# Patient Record
Sex: MALE | Race: WHITE | NOT HISPANIC OR LATINO | URBAN - METROPOLITAN AREA
[De-identification: names, ages, dates, MRNs, and addresses within clinical notes are randomized per-mention and may not be internally consistent; named-entity substitution may affect disease eponyms.]

---

## 2023-12-12 PROBLEM — M54.12 RIGHT CERVICAL RADICULOPATHY: Status: ACTIVE | Noted: 2023-12-12

## 2023-12-12 PROBLEM — F45.8 BRUXISM: Status: ACTIVE | Noted: 2023-12-12

## 2023-12-12 PROBLEM — J32.9 SINUSITIS: Status: ACTIVE | Noted: 2023-12-12

## 2023-12-12 PROBLEM — K44.9 HIATAL HERNIA: Status: ACTIVE | Noted: 2023-12-12

## 2023-12-12 PROBLEM — H53.2 TRANSIENT DIPLOPIA: Status: ACTIVE | Noted: 2023-12-12

## 2023-12-12 PROBLEM — F43.21 SITUATIONAL DEPRESSION: Status: ACTIVE | Noted: 2023-12-12

## 2023-12-12 PROBLEM — I10 HYPERTENSION: Status: ACTIVE | Noted: 2023-12-12

## 2023-12-12 PROBLEM — J30.9 ALLERGIC RHINITIS: Status: ACTIVE | Noted: 2023-12-12

## 2023-12-12 PROBLEM — J45.909 REACTIVE AIRWAY DISEASE (HHS-HCC): Status: ACTIVE | Noted: 2023-12-12

## 2023-12-12 PROBLEM — M26.629 TMJ ARTHRALGIA: Status: ACTIVE | Noted: 2023-12-12

## 2023-12-12 PROBLEM — F11.21 NARCOTIC DEPENDENCE, IN REMISSION (MULTI): Status: ACTIVE | Noted: 2023-12-12

## 2023-12-12 PROBLEM — E78.5 HYPERLIPIDEMIA: Status: ACTIVE | Noted: 2023-12-12

## 2023-12-12 PROBLEM — M25.562 LEFT KNEE PAIN: Status: ACTIVE | Noted: 2023-12-12

## 2023-12-12 PROBLEM — M25.532 LEFT WRIST PAIN: Status: ACTIVE | Noted: 2023-12-12

## 2023-12-12 PROBLEM — E66.3 OVERWEIGHT: Status: ACTIVE | Noted: 2023-12-12

## 2023-12-12 PROBLEM — K22.70 BARRETT'S ESOPHAGUS: Status: ACTIVE | Noted: 2023-12-12

## 2023-12-12 PROBLEM — J20.9 ACUTE BRONCHITIS: Status: ACTIVE | Noted: 2023-12-12

## 2023-12-12 PROBLEM — M19.90 ARTHRITIS: Status: ACTIVE | Noted: 2023-12-12

## 2023-12-12 PROBLEM — R63.4 UNINTENTIONAL WEIGHT LOSS: Status: ACTIVE | Noted: 2023-12-12

## 2023-12-12 PROBLEM — G89.29 CHRONIC PAIN: Status: ACTIVE | Noted: 2023-12-12

## 2023-12-12 PROBLEM — M54.9 BACKACHE: Status: ACTIVE | Noted: 2023-12-12

## 2023-12-12 PROBLEM — R61 NIGHT SWEATS: Status: ACTIVE | Noted: 2023-12-12

## 2023-12-12 PROBLEM — R22.32 MASS OF LEFT AXILLA: Status: ACTIVE | Noted: 2023-12-12

## 2023-12-12 PROBLEM — R11.0 NAUSEA: Status: ACTIVE | Noted: 2023-12-12

## 2023-12-12 RX ORDER — PANTOPRAZOLE SODIUM 40 MG/1
1 TABLET, DELAYED RELEASE ORAL DAILY
COMMUNITY
Start: 2013-05-21 | End: 2024-04-09 | Stop reason: WASHOUT

## 2023-12-12 RX ORDER — ATORVASTATIN CALCIUM 20 MG/1
1 TABLET, FILM COATED ORAL DAILY
COMMUNITY
Start: 2017-12-15

## 2023-12-12 RX ORDER — CYCLOBENZAPRINE HCL 10 MG
10 TABLET ORAL 3 TIMES DAILY PRN
COMMUNITY
Start: 2018-01-22

## 2023-12-12 RX ORDER — LOSARTAN POTASSIUM 100 MG/1
1 TABLET ORAL DAILY
COMMUNITY
Start: 2018-03-12

## 2023-12-12 RX ORDER — CALCIUM/MAGNESIUM 300-300 MG
1 TABLET ORAL DAILY
COMMUNITY
Start: 2017-08-22

## 2023-12-12 RX ORDER — IBUPROFEN 800 MG/1
TABLET ORAL
COMMUNITY
Start: 2017-10-13 | End: 2024-04-09 | Stop reason: WASHOUT

## 2023-12-13 ENCOUNTER — TELEPHONE (OUTPATIENT)
Dept: PAIN MEDICINE | Facility: CLINIC | Age: 58
End: 2023-12-13

## 2023-12-13 ENCOUNTER — OFFICE VISIT (OUTPATIENT)
Dept: PAIN MEDICINE | Facility: CLINIC | Age: 58
End: 2023-12-13
Payer: COMMERCIAL

## 2023-12-13 VITALS
HEART RATE: 62 BPM | SYSTOLIC BLOOD PRESSURE: 149 MMHG | WEIGHT: 199 LBS | OXYGEN SATURATION: 99 % | RESPIRATION RATE: 16 BRPM | TEMPERATURE: 97.5 F | DIASTOLIC BLOOD PRESSURE: 80 MMHG | HEIGHT: 72 IN | BODY MASS INDEX: 26.95 KG/M2

## 2023-12-13 DIAGNOSIS — G90.512 COMPLEX REGIONAL PAIN SYNDROME TYPE 1 OF LEFT UPPER EXTREMITY: Primary | ICD-10-CM

## 2023-12-13 DIAGNOSIS — M25.532 LEFT WRIST PAIN: ICD-10-CM

## 2023-12-13 PROCEDURE — 99203 OFFICE O/P NEW LOW 30 MIN: CPT | Performed by: ANESTHESIOLOGY

## 2023-12-13 PROCEDURE — 1036F TOBACCO NON-USER: CPT | Performed by: ANESTHESIOLOGY

## 2023-12-13 PROCEDURE — 99213 OFFICE O/P EST LOW 20 MIN: CPT | Performed by: ANESTHESIOLOGY

## 2023-12-13 PROCEDURE — 3079F DIAST BP 80-89 MM HG: CPT | Performed by: ANESTHESIOLOGY

## 2023-12-13 PROCEDURE — 3077F SYST BP >= 140 MM HG: CPT | Performed by: ANESTHESIOLOGY

## 2023-12-13 RX ORDER — OXYCODONE AND ACETAMINOPHEN 7.5; 325 MG/1; MG/1
1 TABLET ORAL EVERY 8 HOURS PRN
COMMUNITY

## 2023-12-13 ASSESSMENT — PATIENT HEALTH QUESTIONNAIRE - PHQ9
1. LITTLE INTEREST OR PLEASURE IN DOING THINGS: NOT AT ALL
SUM OF ALL RESPONSES TO PHQ9 QUESTIONS 1 AND 2: 0
2. FEELING DOWN, DEPRESSED OR HOPELESS: NOT AT ALL

## 2023-12-13 ASSESSMENT — ENCOUNTER SYMPTOMS
OCCASIONAL FEELINGS OF UNSTEADINESS: 0
DIFFICULTY URINATING: 0
SHORTNESS OF BREATH: 0
WEAKNESS: 1
FEVER: 0
DEPRESSION: 0
ABDOMINAL PAIN: 0
LOSS OF SENSATION IN FEET: 0
EYE PAIN: 0
ADENOPATHY: 0
BACK PAIN: 0

## 2023-12-13 ASSESSMENT — PAIN - FUNCTIONAL ASSESSMENT: PAIN_FUNCTIONAL_ASSESSMENT: 0-10

## 2023-12-13 ASSESSMENT — LIFESTYLE VARIABLES: TOTAL SCORE: 0

## 2023-12-13 ASSESSMENT — PAIN SCALES - GENERAL
PAINLEVEL: 7
PAINLEVEL_OUTOF10: 7

## 2023-12-13 ASSESSMENT — COLUMBIA-SUICIDE SEVERITY RATING SCALE - C-SSRS
6. HAVE YOU EVER DONE ANYTHING, STARTED TO DO ANYTHING, OR PREPARED TO DO ANYTHING TO END YOUR LIFE?: NO
2. HAVE YOU ACTUALLY HAD ANY THOUGHTS OF KILLING YOURSELF?: NO
1. IN THE PAST MONTH, HAVE YOU WISHED YOU WERE DEAD OR WISHED YOU COULD GO TO SLEEP AND NOT WAKE UP?: NO

## 2023-12-13 NOTE — PROGRESS NOTES
Chief Complaint   Patient presents with    New Patient Visit     Patient has left wrist pain     History of Present Complaint:  The patient was referred to us by Referring Provider: Dr. Vishal Alvarez . this is 58 y.o.  male  Not accompanied by another person with a past history of  hypertension hyperlipidemia TMG Glass's esophagus, resolve for 3 years now, history of arthritis back pain left knee pain left wrist pain cervical pain  presenting with  for the left wrist pain.  Patient states that his cousin is a doctor who referred him here for  Radha ganglion cyst this injection as a treatment for his left wrist pain    Pain started 2015 patient had a torn scapular ligaments in his left hand he had a planned surgical procedure done patient states that the doctor removed 5 extra bones which caused him complex pain regional syndrome Worse unchanged     The pain is described as  burning numbing tingling  and is relieved by nothing.      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    Diagnostic studies:  X-rays of the left wrist    Opioid Risk Assessment Score 0/26
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
## 2023-12-15 ENCOUNTER — OFFICE VISIT (OUTPATIENT)
Dept: PAIN MEDICINE | Facility: CLINIC | Age: 58
End: 2023-12-15
Payer: COMMERCIAL

## 2023-12-15 ENCOUNTER — HOSPITAL ENCOUNTER (OUTPATIENT)
Dept: PAIN MEDICINE | Facility: CLINIC | Age: 58
Discharge: HOME | End: 2023-12-15
Payer: COMMERCIAL

## 2023-12-15 VITALS
TEMPERATURE: 97.9 F | OXYGEN SATURATION: 96 % | HEART RATE: 72 BPM | DIASTOLIC BLOOD PRESSURE: 88 MMHG | RESPIRATION RATE: 20 BRPM | SYSTOLIC BLOOD PRESSURE: 186 MMHG

## 2023-12-15 VITALS
SYSTOLIC BLOOD PRESSURE: 113 MMHG | TEMPERATURE: 97.9 F | HEART RATE: 69 BPM | DIASTOLIC BLOOD PRESSURE: 56 MMHG | OXYGEN SATURATION: 96 % | RESPIRATION RATE: 18 BRPM

## 2023-12-15 DIAGNOSIS — G90.512 COMPLEX REGIONAL PAIN SYNDROME TYPE 1 OF LEFT UPPER EXTREMITY: Primary | ICD-10-CM

## 2023-12-15 DIAGNOSIS — G90.512 COMPLEX REGIONAL PAIN SYNDROME TYPE 1 OF LEFT UPPER EXTREMITY: ICD-10-CM

## 2023-12-15 PROCEDURE — 2500000005 HC RX 250 GENERAL PHARMACY W/O HCPCS

## 2023-12-15 PROCEDURE — 7100000010 HC PHASE TWO TIME - EACH INCREMENTAL 1 MINUTE

## 2023-12-15 PROCEDURE — 7100000009 HC PHASE TWO TIME - INITIAL BASE CHARGE

## 2023-12-15 PROCEDURE — 2500000004 HC RX 250 GENERAL PHARMACY W/ HCPCS (ALT 636 FOR OP/ED)

## 2023-12-15 PROCEDURE — 2500000004 HC RX 250 GENERAL PHARMACY W/ HCPCS (ALT 636 FOR OP/ED): Performed by: ANESTHESIOLOGY

## 2023-12-15 PROCEDURE — 64510 N BLOCK STELLATE GANGLION: CPT | Performed by: ANESTHESIOLOGY

## 2023-12-15 PROCEDURE — 64510 N BLOCK STELLATE GANGLION: CPT

## 2023-12-15 PROCEDURE — A4216 STERILE WATER/SALINE, 10 ML: HCPCS

## 2023-12-15 RX ORDER — LIDOCAINE HYDROCHLORIDE 10 MG/ML
INJECTION, SOLUTION EPIDURAL; INFILTRATION; INTRACAUDAL; PERINEURAL
Status: COMPLETED
Start: 2023-12-15 | End: 2023-12-15

## 2023-12-15 RX ORDER — DEXAMETHASONE SODIUM PHOSPHATE 10 MG/ML
INJECTION INTRAMUSCULAR; INTRAVENOUS
Status: COMPLETED
Start: 2023-12-15 | End: 2023-12-15

## 2023-12-15 RX ORDER — SODIUM CHLORIDE 9 MG/ML
INJECTION, SOLUTION INTRAMUSCULAR; INTRAVENOUS; SUBCUTANEOUS
Status: COMPLETED
Start: 2023-12-15 | End: 2023-12-15

## 2023-12-15 RX ORDER — ROPIVACAINE HYDROCHLORIDE 5 MG/ML
INJECTION, SOLUTION EPIDURAL; INFILTRATION; PERINEURAL
Status: COMPLETED
Start: 2023-12-15 | End: 2023-12-15

## 2023-12-15 RX ADMIN — ROPIVACAINE HYDROCHLORIDE 150 MG: 5 INJECTION, SOLUTION EPIDURAL; INFILTRATION; PERINEURAL at 11:37

## 2023-12-15 RX ADMIN — DEXAMETHASONE SODIUM PHOSPHATE 10 MG: 10 INJECTION, SOLUTION INTRAMUSCULAR; INTRAVENOUS at 11:36

## 2023-12-15 RX ADMIN — SODIUM CHLORIDE, SODIUM LACTATE, POTASSIUM CHLORIDE, AND CALCIUM CHLORIDE 500 ML: .6; .31; .03; .02 INJECTION, SOLUTION INTRAVENOUS at 11:20

## 2023-12-15 RX ADMIN — LIDOCAINE HYDROCHLORIDE 50 MG: 10 INJECTION, SOLUTION EPIDURAL; INFILTRATION; INTRACAUDAL; PERINEURAL at 11:37

## 2023-12-15 RX ADMIN — SODIUM CHLORIDE: 9 INJECTION INTRAMUSCULAR; INTRAVENOUS; SUBCUTANEOUS at 11:42

## 2023-12-15 ASSESSMENT — COLUMBIA-SUICIDE SEVERITY RATING SCALE - C-SSRS
6. HAVE YOU EVER DONE ANYTHING, STARTED TO DO ANYTHING, OR PREPARED TO DO ANYTHING TO END YOUR LIFE?: NO
2. HAVE YOU ACTUALLY HAD ANY THOUGHTS OF KILLING YOURSELF?: NO
2. HAVE YOU ACTUALLY HAD ANY THOUGHTS OF KILLING YOURSELF?: NO
6. HAVE YOU EVER DONE ANYTHING, STARTED TO DO ANYTHING, OR PREPARED TO DO ANYTHING TO END YOUR LIFE?: NO
1. IN THE PAST MONTH, HAVE YOU WISHED YOU WERE DEAD OR WISHED YOU COULD GO TO SLEEP AND NOT WAKE UP?: NO
1. IN THE PAST MONTH, HAVE YOU WISHED YOU WERE DEAD OR WISHED YOU COULD GO TO SLEEP AND NOT WAKE UP?: NO
6. HAVE YOU EVER DONE ANYTHING, STARTED TO DO ANYTHING, OR PREPARED TO DO ANYTHING TO END YOUR LIFE?: NO
1. IN THE PAST MONTH, HAVE YOU WISHED YOU WERE DEAD OR WISHED YOU COULD GO TO SLEEP AND NOT WAKE UP?: NO
2. HAVE YOU ACTUALLY HAD ANY THOUGHTS OF KILLING YOURSELF?: NO

## 2023-12-15 ASSESSMENT — PAIN SCALES - GENERAL
PAINLEVEL_OUTOF10: 7
PAINLEVEL_OUTOF10: 5 - MODERATE PAIN
PAINLEVEL_OUTOF10: 5 - MODERATE PAIN

## 2023-12-15 ASSESSMENT — PAIN - FUNCTIONAL ASSESSMENT
PAIN_FUNCTIONAL_ASSESSMENT: 0-10
PAIN_FUNCTIONAL_ASSESSMENT: 0-10

## 2023-12-15 ASSESSMENT — PATIENT HEALTH QUESTIONNAIRE - PHQ9
SUM OF ALL RESPONSES TO PHQ9 QUESTIONS 1 AND 2: 0
1. LITTLE INTEREST OR PLEASURE IN DOING THINGS: NOT AT ALL
2. FEELING DOWN, DEPRESSED OR HOPELESS: NOT AT ALL

## 2023-12-15 ASSESSMENT — ENCOUNTER SYMPTOMS
OCCASIONAL FEELINGS OF UNSTEADINESS: 0
DEPRESSION: 0
LOSS OF SENSATION IN FEET: 0

## 2023-12-15 NOTE — NURSING NOTE
Patient signed electronic consent  or paper consent   In room: 1129  Participants: Robin Mir Bucknavich, Robert RN, Elayne Farrar LPN, Max Leavitt [facility]  Patient placed R side down position  Time out : 1131  Prep and drapped 1132  Procedure start:1135  Patient tolerating procedure .  Imaged  Ultra Sound Used .   Procedure end: 1150  Debriefing :1150  Band aid applied L neck .  Out of room: 1152 to Phase One recovery.

## 2023-12-15 NOTE — OP NOTE
Procedure Note     Date: 12/15/2023  OR Location: PAR NON-OR PROCEDURES    Name: Sundar Gentile, : 1965, Age: 58 y.o., MRN: [id number], Sex: male    Diagnosis  Preprocedure diagnosis: CRPS type 1  Postprocedure diagnosis: Same    Procedures  Left-sided Stellate ganglion block  The patient was seen in the preoperative area. The risks, benefits, complications, treatment options, non-operative alternatives, expected recovery and outcomes were discussed with the patient. The patient concurred with the proposed plan, giving informed consent.       Procedure: The risks and benefits of treatment options and alternatives were discussed with the patient, and consent was obtained for a stellate ganglion block.  Standard ASA monitoring was used throughout the procedure including EKG, pulse oximetry and noninvasive blood pressure monitoring.  The patient was positioned in lateral position with left side up. Using ultrasound left-sided carotid and internal jugular were identified at C6 level.  Color Doppler was used to identify vascular structures.  A 25-gauge needle was used to anesthetize the skin and subcutaneous tissue with 1% lidocaine.  Then a 22-gauge 1-1/2 inch needle was advanced under direct ultrasound guidance avoiding any vascular structure, needle was advanced to just posterior to internal carotid artery, after negative aspiration a solution containing 2 mL of 0.5% ropivacaine, 2 mL of normal saline 10 mg of dexamethasone was injected in small aliquots expanding the space between internal carotid and longus colli muscle was visualized with live ultrasound.  The patient tolerated the procedure without inducing paresthesia or pain. Patient was transferred to recovery in stable condition and subsequently discharged home.        Complications:  None; patient tolerated the procedure well.    Disposition: Home  Condition: stable         Additional Details: NA    Attending Attestation: I performed the  procedure.    Robin Tejada MD

## 2023-12-15 NOTE — DISCHARGE INSTRUCTIONS
Discharge instructions reviewed verbally and printed instructions given.  Patient verbalize understanding.

## 2023-12-15 NOTE — PROGRESS NOTES
Patient signed electronic consent  or paper consent   In room: 1129  Participants: Robin Mir Bucknavich, Robert RN, Elayne Farrar LPN, Max Leavitt [facility]  Patient placed R side down position  Time out : 1131  Prep and drridge 1132  Procedure start:1135  Patient tolerating procedure .  Imaged  Ultra Sound Used .   Procedure end:   Debriefing :  Band aid applied L neck .  Out of room:

## 2023-12-18 ENCOUNTER — TELEPHONE (OUTPATIENT)
Dept: PAIN MEDICINE | Facility: CLINIC | Age: 58
End: 2023-12-18
Payer: COMMERCIAL

## 2023-12-18 DIAGNOSIS — G90.512 COMPLEX REGIONAL PAIN SYNDROME TYPE 1 OF LEFT UPPER EXTREMITY: Primary | ICD-10-CM

## 2023-12-18 NOTE — TELEPHONE ENCOUNTER
PT called and said he needs to be scheduled for another sympathetic block. He stated he is supposed to have 3 done before the end of 2023.  He was just here on 12/15 and is scheduled for a FUV 2/8.      Please advise if the patient is to have another block with you.  If so, can you put in a new order so he can get scheduled?  Thank you.

## 2023-12-18 NOTE — TELEPHONE ENCOUNTER
Left VM for patient to call me back with any information regarding the benefit he got from the shot on 12/15.

## 2023-12-18 NOTE — TELEPHONE ENCOUNTER
Patient called back and stated he did not get much benefit at all.  Out of ten he said maybe a 1/2 out of 10.  Please advise if you would like to have him scheduled for this another procedure and if you could, please put in new orders.  Thank you.

## 2023-12-22 ENCOUNTER — HOSPITAL ENCOUNTER (OUTPATIENT)
Dept: PAIN MEDICINE | Facility: CLINIC | Age: 58
Discharge: HOME | End: 2023-12-22
Payer: COMMERCIAL

## 2023-12-22 VITALS
TEMPERATURE: 96.8 F | SYSTOLIC BLOOD PRESSURE: 174 MMHG | DIASTOLIC BLOOD PRESSURE: 101 MMHG | RESPIRATION RATE: 18 BRPM | OXYGEN SATURATION: 98 % | HEART RATE: 65 BPM

## 2023-12-22 DIAGNOSIS — G90.512 COMPLEX REGIONAL PAIN SYNDROME TYPE 1 OF LEFT UPPER EXTREMITY: Primary | ICD-10-CM

## 2023-12-22 PROCEDURE — 2500000005 HC RX 250 GENERAL PHARMACY W/O HCPCS

## 2023-12-22 PROCEDURE — 64510 N BLOCK STELLATE GANGLION: CPT | Performed by: ANESTHESIOLOGY

## 2023-12-22 PROCEDURE — 2500000004 HC RX 250 GENERAL PHARMACY W/ HCPCS (ALT 636 FOR OP/ED)

## 2023-12-22 PROCEDURE — 2500000004 HC RX 250 GENERAL PHARMACY W/ HCPCS (ALT 636 FOR OP/ED): Performed by: ANESTHESIOLOGY

## 2023-12-22 RX ORDER — DEXAMETHASONE SODIUM PHOSPHATE 10 MG/ML
INJECTION INTRAMUSCULAR; INTRAVENOUS
Status: COMPLETED
Start: 2023-12-22 | End: 2023-12-22

## 2023-12-22 RX ORDER — LIDOCAINE HYDROCHLORIDE 10 MG/ML
INJECTION, SOLUTION EPIDURAL; INFILTRATION; INTRACAUDAL; PERINEURAL
Status: COMPLETED
Start: 2023-12-22 | End: 2023-12-22

## 2023-12-22 RX ORDER — ROPIVACAINE HYDROCHLORIDE 5 MG/ML
INJECTION, SOLUTION EPIDURAL; INFILTRATION; PERINEURAL
Status: COMPLETED
Start: 2023-12-22 | End: 2023-12-22

## 2023-12-22 RX ADMIN — DEXAMETHASONE SODIUM PHOSPHATE 10 MG: 10 INJECTION, SOLUTION INTRAMUSCULAR; INTRAVENOUS at 10:29

## 2023-12-22 RX ADMIN — ROPIVACAINE HYDROCHLORIDE 100 MG: 5 INJECTION, SOLUTION EPIDURAL; INFILTRATION; PERINEURAL at 10:29

## 2023-12-22 RX ADMIN — SODIUM CHLORIDE, POTASSIUM CHLORIDE, SODIUM LACTATE AND CALCIUM CHLORIDE 500 ML: 600; 310; 30; 20 INJECTION, SOLUTION INTRAVENOUS at 11:16

## 2023-12-22 RX ADMIN — LIDOCAINE HYDROCHLORIDE 50 MG: 10 INJECTION, SOLUTION EPIDURAL; INFILTRATION; INTRACAUDAL; PERINEURAL at 10:30

## 2023-12-22 ASSESSMENT — COLUMBIA-SUICIDE SEVERITY RATING SCALE - C-SSRS
2. HAVE YOU ACTUALLY HAD ANY THOUGHTS OF KILLING YOURSELF?: NO
1. IN THE PAST MONTH, HAVE YOU WISHED YOU WERE DEAD OR WISHED YOU COULD GO TO SLEEP AND NOT WAKE UP?: NO
6. HAVE YOU EVER DONE ANYTHING, STARTED TO DO ANYTHING, OR PREPARED TO DO ANYTHING TO END YOUR LIFE?: NO

## 2023-12-22 ASSESSMENT — PAIN SCALES - GENERAL
PAINLEVEL_OUTOF10: 0 - NO PAIN
PAINLEVEL_OUTOF10: 0 - NO PAIN
PAINLEVEL_OUTOF10: 7
PAINLEVEL_OUTOF10: 0 - NO PAIN

## 2023-12-22 ASSESSMENT — PAIN - FUNCTIONAL ASSESSMENT: PAIN_FUNCTIONAL_ASSESSMENT: 0-10

## 2023-12-22 NOTE — Clinical Note
Prepped with ChloraPrep, a minimum of 3 minute dry time, longer if needed, no pooling noted, patient draped in sterile fashion. Right neck

## 2023-12-22 NOTE — OP NOTE
Procedure Note     Date: 2023  OR Location: PAR NON-OR PROCEDURES    Name: Sundar Gentile, : 1965, Age: 58 y.o., MRN: [id number], Sex: male  Diagnosis  Preprocedure diagnosis: CRPS type 1  Postprocedure diagnosis: Same     Procedures  Left-sided Stellate ganglion block  The patient was seen in the preoperative area. The risks, benefits, complications, treatment options, non-operative alternatives, expected recovery and outcomes were discussed with the patient. The patient concurred with the proposed plan, giving informed consent.         Procedure: The risks and benefits of treatment options and alternatives were discussed with the patient, and consent was obtained for a stellate ganglion block.  Standard ASA monitoring was used throughout the procedure including EKG, pulse oximetry and noninvasive blood pressure monitoring.  The patient was positioned in lateral position with left side up. Using ultrasound left-sided carotid and internal jugular were identified at C6 level.  Color Doppler was used to identify vascular structures.  A 25-gauge needle was used to anesthetize the skin and subcutaneous tissue with 1% lidocaine.  Then a 22-gauge 1-1/2 inch needle was advanced under direct ultrasound guidance avoiding any vascular structure, needle was advanced to just posterior to internal carotid artery, after negative aspiration a solution containing 2 mL of 0.5% ropivacaine, 2 mL of normal saline 10 mg of dexamethasone was injected in small aliquots expanding the space between internal carotid and longus colli muscle was visualized with live ultrasound.  The patient tolerated the procedure without inducing paresthesia or pain. Patient was transferred to recovery in stable condition and subsequently discharged home.           Complications:  None; patient tolerated the procedure well.    Disposition: Home  Condition: stable            Additional Details: NA     Attending Attestation: I performed the  procedure.     Robin Tejada MD

## 2023-12-26 ENCOUNTER — TELEPHONE (OUTPATIENT)
Dept: PAIN MEDICINE | Facility: CLINIC | Age: 58
End: 2023-12-26
Payer: COMMERCIAL

## 2023-12-26 NOTE — TELEPHONE ENCOUNTER
Would like a request put in for Sympathetic Block for this Friday.  The request on file has .  Thank You!

## 2023-12-27 NOTE — TELEPHONE ENCOUNTER
Dr Tejada,    The patient called back wanting to schedule the procedure.  He did not get much relief but would like to try it again.  Could you please put in another order so we can get the patient scheduled?  Thank you.

## 2023-12-27 NOTE — TELEPHONE ENCOUNTER
Result Communication    No results found from the In Basket message.    11:25 AM      Results {WERE / WERE NOT:67871} successfully communicated with the {RHEUM PARENT/PATIENT:58834} and they {AMB Acknowledged/Did Not Acknowledge:55321} their understanding.

## 2023-12-28 NOTE — TELEPHONE ENCOUNTER
I called and let the patient know that because he did not get much benefit from the injection the Dr will not be putting in an order for a 3rd procedure.

## 2024-02-08 ENCOUNTER — APPOINTMENT (OUTPATIENT)
Dept: PAIN MEDICINE | Facility: CLINIC | Age: 59
End: 2024-02-08
Payer: COMMERCIAL

## 2024-03-27 DIAGNOSIS — M19.132 POST-TRAUMATIC ARTHRITIS OF WRIST, LEFT: Primary | ICD-10-CM

## 2024-04-08 ENCOUNTER — OFFICE VISIT (OUTPATIENT)
Dept: PAIN MEDICINE | Facility: CLINIC | Age: 59
End: 2024-04-08
Payer: COMMERCIAL

## 2024-04-08 VITALS
RESPIRATION RATE: 18 BRPM | OXYGEN SATURATION: 100 % | TEMPERATURE: 97.2 F | DIASTOLIC BLOOD PRESSURE: 84 MMHG | SYSTOLIC BLOOD PRESSURE: 136 MMHG | HEART RATE: 62 BPM | WEIGHT: 203 LBS | HEIGHT: 71 IN | BODY MASS INDEX: 28.42 KG/M2

## 2024-04-08 DIAGNOSIS — M25.532 LEFT WRIST PAIN: ICD-10-CM

## 2024-04-08 DIAGNOSIS — M19.132 POST-TRAUMATIC ARTHRITIS OF WRIST, LEFT: Primary | ICD-10-CM

## 2024-04-08 PROCEDURE — 1036F TOBACCO NON-USER: CPT | Performed by: ANESTHESIOLOGY

## 2024-04-08 PROCEDURE — 99212 OFFICE O/P EST SF 10 MIN: CPT | Performed by: ANESTHESIOLOGY

## 2024-04-08 PROCEDURE — 3075F SYST BP GE 130 - 139MM HG: CPT | Performed by: ANESTHESIOLOGY

## 2024-04-08 PROCEDURE — 3079F DIAST BP 80-89 MM HG: CPT | Performed by: ANESTHESIOLOGY

## 2024-04-08 SDOH — ECONOMIC STABILITY: FOOD INSECURITY: WITHIN THE PAST 12 MONTHS, YOU WORRIED THAT YOUR FOOD WOULD RUN OUT BEFORE YOU GOT MONEY TO BUY MORE.: NEVER TRUE

## 2024-04-08 SDOH — ECONOMIC STABILITY: FOOD INSECURITY: WITHIN THE PAST 12 MONTHS, THE FOOD YOU BOUGHT JUST DIDN'T LAST AND YOU DIDN'T HAVE MONEY TO GET MORE.: NEVER TRUE

## 2024-04-08 ASSESSMENT — PATIENT HEALTH QUESTIONNAIRE - PHQ9
2. FEELING DOWN, DEPRESSED OR HOPELESS: NOT AT ALL
1. LITTLE INTEREST OR PLEASURE IN DOING THINGS: NOT AT ALL
SUM OF ALL RESPONSES TO PHQ9 QUESTIONS 1 AND 2: 0

## 2024-04-08 ASSESSMENT — ENCOUNTER SYMPTOMS
FEVER: 0
LOSS OF SENSATION IN FEET: 0
SHORTNESS OF BREATH: 0
DEPRESSION: 0
OCCASIONAL FEELINGS OF UNSTEADINESS: 0

## 2024-04-08 ASSESSMENT — LIFESTYLE VARIABLES
SKIP TO QUESTIONS 9-10: 1
AUDIT-C TOTAL SCORE: 0
HOW OFTEN DO YOU HAVE A DRINK CONTAINING ALCOHOL: NEVER
HOW MANY STANDARD DRINKS CONTAINING ALCOHOL DO YOU HAVE ON A TYPICAL DAY: PATIENT DOES NOT DRINK
HOW OFTEN DO YOU HAVE SIX OR MORE DRINKS ON ONE OCCASION: NEVER

## 2024-04-08 ASSESSMENT — PAIN DESCRIPTION - DESCRIPTORS: DESCRIPTORS: ACHING;RADIATING;THROBBING

## 2024-04-08 ASSESSMENT — PAIN - FUNCTIONAL ASSESSMENT: PAIN_FUNCTIONAL_ASSESSMENT: 0-10

## 2024-04-08 ASSESSMENT — PAIN SCALES - GENERAL
PAINLEVEL: 8
PAINLEVEL_OUTOF10: 8

## 2024-04-08 ASSESSMENT — COLUMBIA-SUICIDE SEVERITY RATING SCALE - C-SSRS
1. IN THE PAST MONTH, HAVE YOU WISHED YOU WERE DEAD OR WISHED YOU COULD GO TO SLEEP AND NOT WAKE UP?: NO
6. HAVE YOU EVER DONE ANYTHING, STARTED TO DO ANYTHING, OR PREPARED TO DO ANYTHING TO END YOUR LIFE?: NO
2. HAVE YOU ACTUALLY HAD ANY THOUGHTS OF KILLING YOURSELF?: NO
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
## 2024-04-09 ENCOUNTER — PRE-ADMISSION TESTING (OUTPATIENT)
Dept: PREADMISSION TESTING | Facility: HOSPITAL | Age: 59
End: 2024-04-09
Payer: COMMERCIAL

## 2024-04-09 ENCOUNTER — LAB (OUTPATIENT)
Dept: LAB | Facility: LAB | Age: 59
End: 2024-04-09
Payer: COMMERCIAL

## 2024-04-09 VITALS
HEIGHT: 71 IN | BODY MASS INDEX: 30.65 KG/M2 | OXYGEN SATURATION: 97 % | TEMPERATURE: 97.2 F | RESPIRATION RATE: 16 BRPM | HEART RATE: 61 BPM | DIASTOLIC BLOOD PRESSURE: 73 MMHG | WEIGHT: 218.92 LBS | SYSTOLIC BLOOD PRESSURE: 153 MMHG

## 2024-04-09 DIAGNOSIS — M19.132 POST-TRAUMATIC ARTHRITIS OF WRIST, LEFT: ICD-10-CM

## 2024-04-09 DIAGNOSIS — Z01.818 PRE-OP TESTING: Primary | ICD-10-CM

## 2024-04-09 DIAGNOSIS — Z01.818 PRE-OP TESTING: ICD-10-CM

## 2024-04-09 LAB
ANION GAP SERPL CALC-SCNC: 10 MMOL/L (ref 10–20)
BUN SERPL-MCNC: 11 MG/DL (ref 6–23)
CALCIUM SERPL-MCNC: 9.3 MG/DL (ref 8.6–10.3)
CHLORIDE SERPL-SCNC: 104 MMOL/L (ref 98–107)
CO2 SERPL-SCNC: 32 MMOL/L (ref 21–32)
CREAT SERPL-MCNC: 0.92 MG/DL (ref 0.5–1.3)
EGFRCR SERPLBLD CKD-EPI 2021: >90 ML/MIN/1.73M*2
GLUCOSE SERPL-MCNC: 92 MG/DL (ref 74–99)
POTASSIUM SERPL-SCNC: 4.5 MMOL/L (ref 3.5–5.3)
SODIUM SERPL-SCNC: 141 MMOL/L (ref 136–145)

## 2024-04-09 PROCEDURE — 93005 ELECTROCARDIOGRAM TRACING: CPT

## 2024-04-09 PROCEDURE — 80048 BASIC METABOLIC PNL TOTAL CA: CPT

## 2024-04-09 PROCEDURE — 36415 COLL VENOUS BLD VENIPUNCTURE: CPT

## 2024-04-09 PROCEDURE — 99203 OFFICE O/P NEW LOW 30 MIN: CPT | Performed by: NURSE PRACTITIONER

## 2024-04-09 RX ORDER — ASCORBIC ACID 500 MG
500 TABLET,CHEWABLE ORAL DAILY
COMMUNITY

## 2024-04-09 ASSESSMENT — DUKE ACTIVITY SCORE INDEX (DASI)
DASI METS SCORE: 6.7
CAN YOU RUN A SHORT DISTANCE: YES
TOTAL_SCORE: 32.2
CAN YOU HAVE SEXUAL RELATIONS: YES
CAN YOU WALK A BLOCK OR TWO ON LEVEL GROUND: YES
CAN YOU PARTICIPATE IN MODERATE RECREATIONAL ACTIVITIES LIKE GOLF, BOWLING, DANCING, DOUBLES TENNIS OR THROWING A BASEBALL OR FOOTBALL: NO
CAN YOU TAKE CARE OF YOURSELF (EAT, DRESS, BATHE, OR USE TOILET): YES
CAN YOU DO YARD WORK LIKE RAKING LEAVES, WEEDING OR PUSHING A MOWER: NO
CAN YOU WALK INDOORS, SUCH AS AROUND YOUR HOUSE: YES
CAN YOU DO LIGHT WORK AROUND THE HOUSE LIKE DUSTING OR WASHING DISHES: YES
CAN YOU CLIMB A FLIGHT OF STAIRS OR WALK UP A HILL: YES
CAN YOU PARTICIPATE IN STRENOUS SPORTS LIKE SWIMMING, SINGLES TENNIS, FOOTBALL, BASKETBALL, OR SKIING: NO
CAN YOU DO HEAVY WORK AROUND THE HOUSE LIKE SCRUBBING FLOORS OR LIFTING AND MOVING HEAVY FURNITURE: NO
CAN YOU DO MODERATE WORK AROUND THE HOUSE LIKE VACUUMING, SWEEPING FLOORS OR CARRYING GROCERIES: YES

## 2024-04-09 ASSESSMENT — PAIN - FUNCTIONAL ASSESSMENT: PAIN_FUNCTIONAL_ASSESSMENT: 0-10

## 2024-04-09 ASSESSMENT — CHADS2 SCORE
HYPERTENSION: YES
CHF: NO
PRIOR STROKE OR TIA OR THROMBOEMBOLISM: NO
AGE GREATER THAN OR EQUAL TO 75: NO
CHADS2 SCORE: 1
DIABETES: NO

## 2024-04-09 ASSESSMENT — LIFESTYLE VARIABLES: SMOKING_STATUS: NONSMOKER

## 2024-04-09 ASSESSMENT — PAIN SCALES - GENERAL: PAINLEVEL_OUTOF10: 0 - NO PAIN

## 2024-04-09 ASSESSMENT — ACTIVITIES OF DAILY LIVING (ADL): ADL_SCORE: 0

## 2024-04-09 NOTE — PREPROCEDURE INSTRUCTIONS
Medication List            Accurate as of April 9, 2024 10:40 AM. Always use your most recent med list.                ALEVE ORAL  Medication Adjustments for Surgery: Stop 7 days before surgery     atorvastatin 20 mg tablet  Commonly known as: Lipitor  Medication Adjustments for Surgery: Other (Comment)  Notes to patient: May take the morning of surgery if this medication is prescribed to take in the mornings     calcium-magnesium 300-300 mg tablet  Medication Adjustments for Surgery: Stop 7 days before surgery     cyclobenzaprine 10 mg tablet  Commonly known as: Flexeril  Medication Adjustments for Surgery: Take morning of surgery with sip of water, no other fluids     losartan 100 mg tablet  Commonly known as: Cozaar  Medication Adjustments for Surgery: Other (Comment)  Notes to patient: HOLD any evening dose the night before the day of surgery  HOLD the day of surgery     NON FORMULARY  Medication Adjustments for Surgery: Other (Comment)  Notes to patient: Stop any over the counters, herbals and supplements for 7 days before surgery     NON FORMULARY     oxyCODONE-acetaminophen 7.5-325 mg tablet  Commonly known as: Percocet  Medication Adjustments for Surgery: Other (Comment)  Notes to patient: May use the morning of surgery if needed     prasterone (dhea) 50 mg tablet  Medication Adjustments for Surgery: Stop 7 days before surgery     Vitamin C 500 mg chewable tablet  Generic drug: ascorbic acid  Medication Adjustments for Surgery: Stop 7 days before surgery                                  PRE-OPERATIVE INSTRUCTIONS    You will receive notification one business day prior to your procedure to confirm your arrival time. It is important that you answer your phone and/or check your messages during this time. If you do not hear from the surgery center by 5 pm. the day before your procedure, please call [phone].     Please enter the building through the Outpatient entrance and take the elevator off the lobby  to the 2nd floor then check in at the Outpatient Surgery desk on the 2nd floor.    INSTRUCTIONS:  Talk to your surgeon for instructions if you should stop your aspirin, blood thinner, or diabetes medicines.  DO NOT take any multivitamins or over the counter supplements for 7-10 days before surgery.  If not being admitted, you must have an adult immediately available to drive you home after surgery. We also highly recommend you have someone stay with you for the entire day and night of your surgery.  For children having surgery, a parent or legal guardian must accompany them to the surgery center. If this is not possible, please call [phone] to make additional arrangements.  For adults who are unable to consent or make medical decisions for themselves, a legal guardian or Power of  must accompany them to the surgery center. If this is not possible, please call [phone] to make additional arrangements.  Wear comfortable, loose fitting clothing.  All jewelry and piercings must be removed. If you are unable to remove an item or have a dermal piercing, please be sure to tell the nurse when you arrive for surgery.  Nail polish and make-up must be removed.  Avoid smoking or consuming alcohol for 24 hours before surgery.  To help prevent infection, please take a shower/bath and wash your hair the night before and/or morning of surgery (or follow other specific bathing instructions provided).    Preoperative Fasting Guidelines    Why must I stop eating and drinking near surgery time?  With sedation, food or liquid in your stomach can enter your lungs causing serious complications  Increases nausea and vomiting    When do I need to stop eating and drinking before my surgery?  Do not eat any solid food after midnight the night before your surgery/procedure.  You may have up to TEN ounces of clear liquid until TWO hours before your instructed arrival time to the hospital.  This includes water, black tea/coffee,  (no milk or cream) apple juice, and electrolyte drinks (Gatorade).   You may chew gum until TWO hours before your surgery/procedure    If you have any questions or concerns, please call Pre-Admission Testing at [phone].                         Home Preoperative Antibacterial Shower with Chlorhexidine gluconate (CHG)     What is a home preoperative antibacterial shower?  This shower is a way of cleaning the skin with a germ killing solution before surgery. The solution contains chlorhexidine gluconate, commonly known as CHG. CHG is a skin cleanser with germ killing ability. Let your doctor know if you are allergic to chlorhexidine.    Why do I need to take a preoperative antibacterial shower?  Skin is not sterile. It is best to try to make your skin as free of germs as possible before surgery. Proper cleansing with a germ killing soap before surgery can lower the number of germs on your skin. This helps to reduce the risk of infection at the surgical site. Following the instructions listed below will help you prepare your skin for surgery.    How do I use the solution?  Begin using your CHG soap the night before and again the morning of your procedure.   Do not shave the day before or day of surgery.  Remove all jewelry until after surgery. Take off rings and take out all body-piercing jewelry.  Wash your face and hair with normal soap and shampoo before you use the CHG soap.  Apply the CHG solution to a clean wet washcloth. Move away from the water to avoid premature rinsing of the CHG soap as you are applying. Firmly lather your entire body from the neck down. Do not use CHG on your face, eyes, ears, or genitals.   Pay special attention to the area where your incisions will be located.  Do not scrub your skin too hard.  It is important to allow the CHG soap to sit on your skin for 3-5 minutes.  Rinse the solution off your body completely. Do not wash with your normal soap after the CHG soap solution.  Pat  yourself dry with a clean, soft towel.  Do not apply powders, lotions or deodorants as these might block how the CHG soap works.   Dress in clean clothing.  Be sure to sleep with clean, freshly laundered sheets.  Be aware that CHG can cause stains on fabric. Rinse your washcloth and other linens that have contact with CHG completely. Use only non-chlorine detergents to launder the items used.
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
## 2024-04-22 ENCOUNTER — ANESTHESIA (OUTPATIENT)
Dept: OPERATING ROOM | Facility: HOSPITAL | Age: 59
End: 2024-04-22
Payer: COMMERCIAL

## 2024-04-22 ENCOUNTER — ANESTHESIA EVENT (OUTPATIENT)
Dept: OPERATING ROOM | Facility: HOSPITAL | Age: 59
End: 2024-04-22
Payer: COMMERCIAL

## 2024-04-22 ENCOUNTER — HOSPITAL ENCOUNTER (OUTPATIENT)
Facility: HOSPITAL | Age: 59
Discharge: HOME | End: 2024-04-23
Payer: COMMERCIAL

## 2024-04-22 ENCOUNTER — APPOINTMENT (OUTPATIENT)
Dept: RADIOLOGY | Facility: HOSPITAL | Age: 59
End: 2024-04-22
Payer: COMMERCIAL

## 2024-04-22 DIAGNOSIS — M19.132 POST-TRAUMATIC ARTHRITIS OF WRIST, LEFT: ICD-10-CM

## 2024-04-22 DIAGNOSIS — Z40.9 ENCOUNTER FOR PROPHYLACTIC SURGERY: Primary | ICD-10-CM

## 2024-04-22 DIAGNOSIS — G89.18 POST-OPERATIVE PAIN: ICD-10-CM

## 2024-04-22 PROCEDURE — 2720000007 HC OR 272 NO HCPCS

## 2024-04-22 PROCEDURE — A4217 STERILE WATER/SALINE, 500 ML: HCPCS

## 2024-04-22 PROCEDURE — 7100000002 HC RECOVERY ROOM TIME - EACH INCREMENTAL 1 MINUTE

## 2024-04-22 PROCEDURE — 2500000005 HC RX 250 GENERAL PHARMACY W/O HCPCS: Performed by: NURSE ANESTHETIST, CERTIFIED REGISTERED

## 2024-04-22 PROCEDURE — 88305 TISSUE EXAM BY PATHOLOGIST: CPT | Performed by: PATHOLOGY

## 2024-04-22 PROCEDURE — 3700000001 HC GENERAL ANESTHESIA TIME - INITIAL BASE CHARGE

## 2024-04-22 PROCEDURE — C1713 ANCHOR/SCREW BN/BN,TIS/BN: HCPCS

## 2024-04-22 PROCEDURE — A25800 PR FUSION OF WRIST JOINT: Performed by: NURSE ANESTHETIST, CERTIFIED REGISTERED

## 2024-04-22 PROCEDURE — 2500000004 HC RX 250 GENERAL PHARMACY W/ HCPCS (ALT 636 FOR OP/ED)

## 2024-04-22 PROCEDURE — 2500000005 HC RX 250 GENERAL PHARMACY W/O HCPCS

## 2024-04-22 PROCEDURE — 2500000004 HC RX 250 GENERAL PHARMACY W/ HCPCS (ALT 636 FOR OP/ED): Performed by: STUDENT IN AN ORGANIZED HEALTH CARE EDUCATION/TRAINING PROGRAM

## 2024-04-22 PROCEDURE — 2500000004 HC RX 250 GENERAL PHARMACY W/ HCPCS (ALT 636 FOR OP/ED): Performed by: NURSE ANESTHETIST, CERTIFIED REGISTERED

## 2024-04-22 PROCEDURE — 2500000004 HC RX 250 GENERAL PHARMACY W/ HCPCS (ALT 636 FOR OP/ED): Mod: JZ

## 2024-04-22 PROCEDURE — 3700000002 HC GENERAL ANESTHESIA TIME - EACH INCREMENTAL 1 MINUTE

## 2024-04-22 PROCEDURE — 3600000003 HC OR TIME - INITIAL BASE CHARGE - PROCEDURE LEVEL THREE

## 2024-04-22 PROCEDURE — 2780000003 HC OR 278 NO HCPCS

## 2024-04-22 PROCEDURE — 3600000008 HC OR TIME - EACH INCREMENTAL 1 MINUTE - PROCEDURE LEVEL THREE

## 2024-04-22 PROCEDURE — 88305 TISSUE EXAM BY PATHOLOGIST: CPT | Mod: TC,STJLAB

## 2024-04-22 PROCEDURE — A25800 PR FUSION OF WRIST JOINT: Performed by: STUDENT IN AN ORGANIZED HEALTH CARE EDUCATION/TRAINING PROGRAM

## 2024-04-22 PROCEDURE — 64417 NJX AA&/STRD AX NERVE IMG: CPT | Performed by: STUDENT IN AN ORGANIZED HEALTH CARE EDUCATION/TRAINING PROGRAM

## 2024-04-22 PROCEDURE — 7100000001 HC RECOVERY ROOM TIME - INITIAL BASE CHARGE

## 2024-04-22 DEVICE — IMPLANTABLE DEVICE: Type: IMPLANTABLE DEVICE | Site: WRIST | Status: FUNCTIONAL

## 2024-04-22 DEVICE — SCREW, LOCKING 2.7 X 14 SELF TAP: Type: IMPLANTABLE DEVICE | Site: WRIST | Status: FUNCTIONAL

## 2024-04-22 DEVICE — SCREW LOCKING 3.5 W/RECESS 16: Type: IMPLANTABLE DEVICE | Site: WRIST | Status: FUNCTIONAL

## 2024-04-22 DEVICE — SCREW LOCKING 3.5 W/RECESS 18: Type: IMPLANTABLE DEVICE | Site: WRIST | Status: FUNCTIONAL

## 2024-04-22 DEVICE — SCREW, CORTEX SELF TAP W/T8 RECESS 2.7MM X 14MM, SS: Type: IMPLANTABLE DEVICE | Site: WRIST | Status: FUNCTIONAL

## 2024-04-22 DEVICE — SCREW, CORTEX SELF, 2.7MM X 12MM: Type: IMPLANTABLE DEVICE | Site: WRIST | Status: FUNCTIONAL

## 2024-04-22 RX ORDER — CEFAZOLIN SODIUM 2 G/100ML
2 INJECTION, SOLUTION INTRAVENOUS ONCE
Status: COMPLETED | OUTPATIENT
Start: 2024-04-22 | End: 2024-04-22

## 2024-04-22 RX ORDER — AMOXICILLIN 500 MG/1
500 CAPSULE ORAL EVERY 8 HOURS
Qty: 30 CAPSULE | Refills: 0 | Status: SHIPPED | OUTPATIENT
Start: 2024-04-22 | End: 2024-05-02

## 2024-04-22 RX ORDER — POLYETHYLENE GLYCOL 3350 17 G/17G
17 POWDER, FOR SOLUTION ORAL DAILY PRN
Status: DISCONTINUED | OUTPATIENT
Start: 2024-04-22 | End: 2024-04-23 | Stop reason: HOSPADM

## 2024-04-22 RX ORDER — SODIUM CHLORIDE 9 MG/ML
100 INJECTION, SOLUTION INTRAVENOUS CONTINUOUS
Status: ACTIVE | OUTPATIENT
Start: 2024-04-23 | End: 2024-04-23

## 2024-04-22 RX ORDER — HYDROMORPHONE HYDROCHLORIDE 1 MG/ML
0.4 INJECTION, SOLUTION INTRAMUSCULAR; INTRAVENOUS; SUBCUTANEOUS
Status: DISCONTINUED | OUTPATIENT
Start: 2024-04-22 | End: 2024-04-22 | Stop reason: HOSPADM

## 2024-04-22 RX ORDER — BUPIVACAINE HYDROCHLORIDE 2.5 MG/ML
INJECTION, SOLUTION EPIDURAL; INFILTRATION; INTRACAUDAL AS NEEDED
Status: DISCONTINUED | OUTPATIENT
Start: 2024-04-22 | End: 2024-04-22 | Stop reason: HOSPADM

## 2024-04-22 RX ORDER — ROCURONIUM BROMIDE 50 MG/5 ML
SYRINGE (ML) INTRAVENOUS AS NEEDED
Status: DISCONTINUED | OUTPATIENT
Start: 2024-04-22 | End: 2024-04-22

## 2024-04-22 RX ORDER — SODIUM CHLORIDE 0.9 G/100ML
IRRIGANT IRRIGATION AS NEEDED
Status: DISCONTINUED | OUTPATIENT
Start: 2024-04-22 | End: 2024-04-22 | Stop reason: HOSPADM

## 2024-04-22 RX ORDER — LABETALOL HYDROCHLORIDE 5 MG/ML
5 INJECTION, SOLUTION INTRAVENOUS ONCE AS NEEDED
Status: DISCONTINUED | OUTPATIENT
Start: 2024-04-22 | End: 2024-04-22 | Stop reason: HOSPADM

## 2024-04-22 RX ORDER — LIDOCAINE HYDROCHLORIDE 20 MG/ML
INJECTION, SOLUTION INFILTRATION; PERINEURAL AS NEEDED
Status: DISCONTINUED | OUTPATIENT
Start: 2024-04-22 | End: 2024-04-22 | Stop reason: HOSPADM

## 2024-04-22 RX ORDER — ONDANSETRON HYDROCHLORIDE 2 MG/ML
4 INJECTION, SOLUTION INTRAVENOUS EVERY 6 HOURS PRN
Status: DISCONTINUED | OUTPATIENT
Start: 2024-04-22 | End: 2024-04-23 | Stop reason: HOSPADM

## 2024-04-22 RX ORDER — LIDOCAINE HYDROCHLORIDE 20 MG/ML
INJECTION, SOLUTION EPIDURAL; INFILTRATION; INTRACAUDAL; PERINEURAL AS NEEDED
Status: DISCONTINUED | OUTPATIENT
Start: 2024-04-22 | End: 2024-04-22

## 2024-04-22 RX ORDER — LABETALOL HYDROCHLORIDE 5 MG/ML
INJECTION, SOLUTION INTRAVENOUS AS NEEDED
Status: DISCONTINUED | OUTPATIENT
Start: 2024-04-22 | End: 2024-04-22

## 2024-04-22 RX ORDER — TALC
3 POWDER (GRAM) TOPICAL NIGHTLY PRN
Status: DISCONTINUED | OUTPATIENT
Start: 2024-04-22 | End: 2024-04-23 | Stop reason: HOSPADM

## 2024-04-22 RX ORDER — OXYCODONE HYDROCHLORIDE 5 MG/1
5 TABLET ORAL EVERY 4 HOURS PRN
Status: DISCONTINUED | OUTPATIENT
Start: 2024-04-22 | End: 2024-04-22 | Stop reason: HOSPADM

## 2024-04-22 RX ORDER — PROPOFOL 10 MG/ML
INJECTION, EMULSION INTRAVENOUS AS NEEDED
Status: DISCONTINUED | OUTPATIENT
Start: 2024-04-22 | End: 2024-04-22

## 2024-04-22 RX ORDER — PROPOFOL 10 MG/ML
INJECTION, EMULSION INTRAVENOUS CONTINUOUS PRN
Status: DISCONTINUED | OUTPATIENT
Start: 2024-04-22 | End: 2024-04-22

## 2024-04-22 RX ORDER — SODIUM CHLORIDE, SODIUM LACTATE, POTASSIUM CHLORIDE, CALCIUM CHLORIDE 600; 310; 30; 20 MG/100ML; MG/100ML; MG/100ML; MG/100ML
100 INJECTION, SOLUTION INTRAVENOUS CONTINUOUS
Status: DISCONTINUED | OUTPATIENT
Start: 2024-04-22 | End: 2024-04-22 | Stop reason: HOSPADM

## 2024-04-22 RX ORDER — HYDRALAZINE HYDROCHLORIDE 20 MG/ML
INJECTION INTRAMUSCULAR; INTRAVENOUS AS NEEDED
Status: DISCONTINUED | OUTPATIENT
Start: 2024-04-22 | End: 2024-04-22

## 2024-04-22 RX ORDER — SODIUM CHLORIDE, SODIUM LACTATE, POTASSIUM CHLORIDE, CALCIUM CHLORIDE 600; 310; 30; 20 MG/100ML; MG/100ML; MG/100ML; MG/100ML
INJECTION, SOLUTION INTRAVENOUS CONTINUOUS PRN
Status: DISCONTINUED | OUTPATIENT
Start: 2024-04-22 | End: 2024-04-22

## 2024-04-22 RX ORDER — LIDOCAINE HYDROCHLORIDE 10 MG/ML
0.1 INJECTION INFILTRATION; PERINEURAL ONCE
Status: DISCONTINUED | OUTPATIENT
Start: 2024-04-22 | End: 2024-04-22 | Stop reason: HOSPADM

## 2024-04-22 RX ORDER — ALBUTEROL SULFATE 0.83 MG/ML
2.5 SOLUTION RESPIRATORY (INHALATION) ONCE AS NEEDED
Status: DISCONTINUED | OUTPATIENT
Start: 2024-04-22 | End: 2024-04-22 | Stop reason: HOSPADM

## 2024-04-22 RX ORDER — MEPERIDINE HYDROCHLORIDE 50 MG/ML
12.5 INJECTION INTRAMUSCULAR; INTRAVENOUS; SUBCUTANEOUS EVERY 10 MIN PRN
Status: DISCONTINUED | OUTPATIENT
Start: 2024-04-22 | End: 2024-04-22 | Stop reason: HOSPADM

## 2024-04-22 RX ORDER — ONDANSETRON HYDROCHLORIDE 2 MG/ML
4 INJECTION, SOLUTION INTRAVENOUS ONCE AS NEEDED
Status: COMPLETED | OUTPATIENT
Start: 2024-04-22 | End: 2024-04-22

## 2024-04-22 RX ORDER — FENTANYL CITRATE 50 UG/ML
25 INJECTION, SOLUTION INTRAMUSCULAR; INTRAVENOUS EVERY 5 MIN PRN
Status: DISCONTINUED | OUTPATIENT
Start: 2024-04-22 | End: 2024-04-22 | Stop reason: HOSPADM

## 2024-04-22 RX ORDER — MIDAZOLAM HYDROCHLORIDE 1 MG/ML
INJECTION, SOLUTION INTRAMUSCULAR; INTRAVENOUS AS NEEDED
Status: DISCONTINUED | OUTPATIENT
Start: 2024-04-22 | End: 2024-04-22

## 2024-04-22 RX ORDER — FENTANYL CITRATE 50 UG/ML
INJECTION, SOLUTION INTRAMUSCULAR; INTRAVENOUS AS NEEDED
Status: DISCONTINUED | OUTPATIENT
Start: 2024-04-22 | End: 2024-04-22

## 2024-04-22 RX ADMIN — HYDRALAZINE HYDROCHLORIDE 10 MG: 20 INJECTION INTRAMUSCULAR; INTRAVENOUS at 21:33

## 2024-04-22 RX ADMIN — CEFAZOLIN SODIUM 2 G: 2 INJECTION, SOLUTION INTRAVENOUS at 16:33

## 2024-04-22 RX ADMIN — SODIUM CHLORIDE, POTASSIUM CHLORIDE, SODIUM LACTATE AND CALCIUM CHLORIDE: 600; 310; 30; 20 INJECTION, SOLUTION INTRAVENOUS at 16:11

## 2024-04-22 RX ADMIN — Medication 10 MG: at 17:27

## 2024-04-22 RX ADMIN — Medication 50 MG: at 16:21

## 2024-04-22 RX ADMIN — HYDROMORPHONE HYDROCHLORIDE 1 MG: 1 INJECTION, SOLUTION INTRAMUSCULAR; INTRAVENOUS; SUBCUTANEOUS at 21:44

## 2024-04-22 RX ADMIN — Medication 20 MG: at 16:48

## 2024-04-22 RX ADMIN — MIDAZOLAM 2 MG: 1 INJECTION INTRAMUSCULAR; INTRAVENOUS at 16:11

## 2024-04-22 RX ADMIN — HYDROMORPHONE HYDROCHLORIDE 0.5 MG: 1 INJECTION, SOLUTION INTRAMUSCULAR; INTRAVENOUS; SUBCUTANEOUS at 21:44

## 2024-04-22 RX ADMIN — FENTANYL CITRATE 25 MCG: 50 INJECTION, SOLUTION INTRAMUSCULAR; INTRAVENOUS at 21:51

## 2024-04-22 RX ADMIN — PROPOFOL 200 MG: 10 INJECTION, EMULSION INTRAVENOUS at 16:21

## 2024-04-22 RX ADMIN — FENTANYL CITRATE 50 MCG: 50 INJECTION, SOLUTION INTRAMUSCULAR; INTRAVENOUS at 16:53

## 2024-04-22 RX ADMIN — FENTANYL CITRATE 50 MCG: 50 INJECTION, SOLUTION INTRAMUSCULAR; INTRAVENOUS at 19:13

## 2024-04-22 RX ADMIN — Medication 20 MG: at 17:15

## 2024-04-22 RX ADMIN — FENTANYL CITRATE 50 MCG: 50 INJECTION, SOLUTION INTRAMUSCULAR; INTRAVENOUS at 16:11

## 2024-04-22 RX ADMIN — Medication 10 MG: at 19:07

## 2024-04-22 RX ADMIN — FENTANYL CITRATE 25 MCG: 50 INJECTION, SOLUTION INTRAMUSCULAR; INTRAVENOUS at 21:56

## 2024-04-22 RX ADMIN — Medication 10 MG: at 17:49

## 2024-04-22 RX ADMIN — ONDANSETRON 4 MG: 2 INJECTION INTRAMUSCULAR; INTRAVENOUS at 21:59

## 2024-04-22 RX ADMIN — SODIUM CHLORIDE, POTASSIUM CHLORIDE, SODIUM LACTATE AND CALCIUM CHLORIDE: 600; 310; 30; 20 INJECTION, SOLUTION INTRAVENOUS at 18:20

## 2024-04-22 RX ADMIN — LABETALOL HYDROCHLORIDE 10 MG: 5 INJECTION, SOLUTION INTRAVENOUS at 21:33

## 2024-04-22 RX ADMIN — PROPOFOL 50 MCG/KG/MIN: 10 INJECTION, EMULSION INTRAVENOUS at 17:49

## 2024-04-22 RX ADMIN — LIDOCAINE HYDROCHLORIDE 100 MG: 20 INJECTION, SOLUTION EPIDURAL; INFILTRATION; INTRACAUDAL; PERINEURAL at 16:21

## 2024-04-22 RX ADMIN — PROPOFOL 40 MG: 10 INJECTION, EMULSION INTRAVENOUS at 17:49

## 2024-04-22 RX ADMIN — FENTANYL CITRATE 50 MCG: 50 INJECTION, SOLUTION INTRAMUSCULAR; INTRAVENOUS at 17:08

## 2024-04-22 RX ADMIN — HYDROMORPHONE HYDROCHLORIDE 0.5 MG: 1 INJECTION, SOLUTION INTRAMUSCULAR; INTRAVENOUS; SUBCUTANEOUS at 21:59

## 2024-04-22 RX ADMIN — LABETALOL HYDROCHLORIDE 10 MG: 5 INJECTION, SOLUTION INTRAVENOUS at 17:33

## 2024-04-22 RX ADMIN — CEFAZOLIN SODIUM 2 G: 2 INJECTION, SOLUTION INTRAVENOUS at 20:33

## 2024-04-22 RX ADMIN — HYDROMORPHONE HYDROCHLORIDE 0.5 MG: 1 INJECTION, SOLUTION INTRAMUSCULAR; INTRAVENOUS; SUBCUTANEOUS at 22:19

## 2024-04-22 RX ADMIN — FENTANYL CITRATE 50 MCG: 50 INJECTION, SOLUTION INTRAMUSCULAR; INTRAVENOUS at 16:21

## 2024-04-22 RX ADMIN — Medication 20 MG: at 17:07

## 2024-04-22 RX ADMIN — Medication 10 MG: at 18:36

## 2024-04-22 RX ADMIN — HYDROMORPHONE HYDROCHLORIDE 0.5 MG: 1 INJECTION, SOLUTION INTRAMUSCULAR; INTRAVENOUS; SUBCUTANEOUS at 23:17

## 2024-04-22 RX ADMIN — HYDROMORPHONE HYDROCHLORIDE 0.4 MG: 1 INJECTION, SOLUTION INTRAMUSCULAR; INTRAVENOUS; SUBCUTANEOUS at 14:36

## 2024-04-22 RX ADMIN — HYDROMORPHONE HYDROCHLORIDE 1 MG: 1 INJECTION, SOLUTION INTRAMUSCULAR; INTRAVENOUS; SUBCUTANEOUS at 19:31

## 2024-04-22 RX ADMIN — HYDRALAZINE HYDROCHLORIDE 10 MG: 20 INJECTION INTRAMUSCULAR; INTRAVENOUS at 18:34

## 2024-04-22 RX ADMIN — FENTANYL CITRATE 50 MCG: 50 INJECTION, SOLUTION INTRAMUSCULAR; INTRAVENOUS at 18:44

## 2024-04-22 RX ADMIN — HYDROMORPHONE HYDROCHLORIDE 0.5 MG: 1 INJECTION, SOLUTION INTRAMUSCULAR; INTRAVENOUS; SUBCUTANEOUS at 22:26

## 2024-04-22 RX ADMIN — Medication 20 MG: at 18:43

## 2024-04-22 SDOH — HEALTH STABILITY: MENTAL HEALTH: CURRENT SMOKER: 0

## 2024-04-22 ASSESSMENT — PAIN SCALES - GENERAL
PAINLEVEL_OUTOF10: 7
PAINLEVEL_OUTOF10: 10 - WORST POSSIBLE PAIN
PAIN_LEVEL: 0
PAINLEVEL_OUTOF10: 7
PAINLEVEL_OUTOF10: 10 - WORST POSSIBLE PAIN
PAINLEVEL_OUTOF10: 9
PAINLEVEL_OUTOF10: 9
PAINLEVEL_OUTOF10: 6
PAINLEVEL_OUTOF10: 8
PAINLEVEL_OUTOF10: 7
PAINLEVEL_OUTOF10: 10 - WORST POSSIBLE PAIN
PAINLEVEL_OUTOF10: 9

## 2024-04-22 ASSESSMENT — PAIN - FUNCTIONAL ASSESSMENT
PAIN_FUNCTIONAL_ASSESSMENT: 0-10

## 2024-04-22 NOTE — ANESTHESIA PROCEDURE NOTES
Airway  Date/Time: 4/22/2024 4:24 PM  Urgency: elective      Staffing  Performed: MANISH   Authorized by: DARRELL Wong DNP    Performed by: DARRELL Wong DNP  Patient location during procedure: OR    Indications and Patient Condition  Indications for airway management: anesthesia  Spontaneous Ventilation: absent  Sedation level: deep  Preoxygenated: yes  Patient position: sniffing  Mask difficulty assessment: 2 - vent by mask + OA or adjuvant +/- NMBA  Planned trial extubation    Final Airway Details  Final airway type: endotracheal airway      Successful airway: ETT  Cuffed: yes   Successful intubation technique: video laryngoscopy  Facilitating devices/methods: intubating stylet  Endotracheal tube insertion site: oral  Blade: Salomon  Blade size: #4  ETT size (mm): 7.5  Cormack-Lehane Classification: grade IIa - partial view of glottis  Placement verified by: chest auscultation and capnometry   Measured from: lips  ETT to lips (cm): 22  Number of attempts at approach: 1  Number of other approaches attempted: 0    Additional Comments  Easy mask ventilation with 90mm OPA. ETT secured with silk tape
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
## 2024-04-23 VITALS
HEART RATE: 65 BPM | WEIGHT: 211 LBS | BODY MASS INDEX: 29.54 KG/M2 | DIASTOLIC BLOOD PRESSURE: 97 MMHG | OXYGEN SATURATION: 93 % | SYSTOLIC BLOOD PRESSURE: 158 MMHG | RESPIRATION RATE: 20 BRPM | HEIGHT: 71 IN | TEMPERATURE: 96.8 F

## 2024-04-23 LAB
ANION GAP SERPL CALC-SCNC: 15 MMOL/L (ref 10–20)
BUN SERPL-MCNC: 12 MG/DL (ref 6–23)
CALCIUM SERPL-MCNC: 8.6 MG/DL (ref 8.6–10.3)
CHLORIDE SERPL-SCNC: 102 MMOL/L (ref 98–107)
CO2 SERPL-SCNC: 24 MMOL/L (ref 21–32)
CREAT SERPL-MCNC: 0.74 MG/DL (ref 0.5–1.3)
EGFRCR SERPLBLD CKD-EPI 2021: >90 ML/MIN/1.73M*2
ERYTHROCYTE [DISTWIDTH] IN BLOOD BY AUTOMATED COUNT: 13.7 % (ref 11.5–14.5)
GLUCOSE SERPL-MCNC: 91 MG/DL (ref 74–99)
HCT VFR BLD AUTO: 41.7 % (ref 41–52)
HGB BLD-MCNC: 13.5 G/DL (ref 13.5–17.5)
MAGNESIUM SERPL-MCNC: 1.86 MG/DL (ref 1.6–2.4)
MCH RBC QN AUTO: 30.8 PG (ref 26–34)
MCHC RBC AUTO-ENTMCNC: 32.4 G/DL (ref 32–36)
MCV RBC AUTO: 95 FL (ref 80–100)
NRBC BLD-RTO: 0 /100 WBCS (ref 0–0)
PHOSPHATE SERPL-MCNC: 3.9 MG/DL (ref 2.5–4.9)
PLATELET # BLD AUTO: 196 X10*3/UL (ref 150–450)
POTASSIUM SERPL-SCNC: 3.4 MMOL/L (ref 3.5–5.3)
RBC # BLD AUTO: 4.38 X10*6/UL (ref 4.5–5.9)
SODIUM SERPL-SCNC: 138 MMOL/L (ref 136–145)
WBC # BLD AUTO: 8.5 X10*3/UL (ref 4.4–11.3)

## 2024-04-23 PROCEDURE — 83735 ASSAY OF MAGNESIUM: CPT | Performed by: NURSE PRACTITIONER

## 2024-04-23 PROCEDURE — 7100000011 HC EXTENDED STAY RECOVERY HOURLY - NURSING UNIT

## 2024-04-23 PROCEDURE — G0378 HOSPITAL OBSERVATION PER HR: HCPCS

## 2024-04-23 PROCEDURE — 85027 COMPLETE CBC AUTOMATED: CPT | Performed by: NURSE PRACTITIONER

## 2024-04-23 PROCEDURE — 80048 BASIC METABOLIC PNL TOTAL CA: CPT | Performed by: NURSE PRACTITIONER

## 2024-04-23 PROCEDURE — 2500000001 HC RX 250 WO HCPCS SELF ADMINISTERED DRUGS (ALT 637 FOR MEDICARE OP): Performed by: NURSE PRACTITIONER

## 2024-04-23 PROCEDURE — 2500000004 HC RX 250 GENERAL PHARMACY W/ HCPCS (ALT 636 FOR OP/ED): Performed by: NURSE PRACTITIONER

## 2024-04-23 PROCEDURE — 84100 ASSAY OF PHOSPHORUS: CPT | Performed by: NURSE PRACTITIONER

## 2024-04-23 PROCEDURE — 36415 COLL VENOUS BLD VENIPUNCTURE: CPT | Performed by: NURSE PRACTITIONER

## 2024-04-23 RX ORDER — PHENOBARBITAL 60 MG/1
60 TABLET ORAL 2 TIMES DAILY
COMMUNITY

## 2024-04-23 RX ORDER — IBUPROFEN 600 MG/1
600 TABLET ORAL EVERY 6 HOURS PRN
Qty: 30 TABLET | Refills: 1 | Status: SHIPPED | OUTPATIENT
Start: 2024-04-23 | End: 2024-05-23

## 2024-04-23 RX ORDER — IBUPROFEN 600 MG/1
600 TABLET ORAL EVERY 6 HOURS PRN
Status: DISCONTINUED | OUTPATIENT
Start: 2024-04-23 | End: 2024-04-23 | Stop reason: HOSPADM

## 2024-04-23 RX ORDER — OXYCODONE HYDROCHLORIDE 10 MG/1
10 TABLET ORAL EVERY 6 HOURS PRN
Status: DISCONTINUED | OUTPATIENT
Start: 2024-04-22 | End: 2024-04-23 | Stop reason: HOSPADM

## 2024-04-23 RX ORDER — ACETAMINOPHEN 325 MG/1
650 TABLET ORAL EVERY 6 HOURS
Status: DISCONTINUED | OUTPATIENT
Start: 2024-04-23 | End: 2024-04-23 | Stop reason: HOSPADM

## 2024-04-23 RX ORDER — OXYCODONE HYDROCHLORIDE 5 MG/1
5 TABLET ORAL EVERY 6 HOURS PRN
Status: DISCONTINUED | OUTPATIENT
Start: 2024-04-22 | End: 2024-04-23 | Stop reason: HOSPADM

## 2024-04-23 RX ORDER — MORPHINE SULFATE 4 MG/ML
4 INJECTION, SOLUTION INTRAMUSCULAR; INTRAVENOUS EVERY 4 HOURS PRN
Status: DISCONTINUED | OUTPATIENT
Start: 2024-04-22 | End: 2024-04-23 | Stop reason: HOSPADM

## 2024-04-23 RX ADMIN — HYDROMORPHONE HYDROCHLORIDE 0.2 MG: 0.2 INJECTION, SOLUTION INTRAMUSCULAR; INTRAVENOUS; SUBCUTANEOUS at 01:27

## 2024-04-23 RX ADMIN — OXYCODONE HYDROCHLORIDE 10 MG: 10 TABLET ORAL at 00:19

## 2024-04-23 RX ADMIN — ACETAMINOPHEN 650 MG: 325 TABLET ORAL at 00:19

## 2024-04-23 RX ADMIN — OXYCODONE HYDROCHLORIDE 10 MG: 10 TABLET ORAL at 12:17

## 2024-04-23 RX ADMIN — SODIUM CHLORIDE 100 ML/HR: 9 INJECTION, SOLUTION INTRAVENOUS at 00:20

## 2024-04-23 RX ADMIN — OXYCODONE HYDROCHLORIDE 10 MG: 10 TABLET ORAL at 06:18

## 2024-04-23 RX ADMIN — ACETAMINOPHEN 650 MG: 325 TABLET ORAL at 06:18

## 2024-04-23 RX ADMIN — HYDROMORPHONE HYDROCHLORIDE 0.2 MG: 0.2 INJECTION, SOLUTION INTRAMUSCULAR; INTRAVENOUS; SUBCUTANEOUS at 08:53

## 2024-04-23 RX ADMIN — ACETAMINOPHEN 650 MG: 325 TABLET ORAL at 11:53

## 2024-04-23 SDOH — SOCIAL STABILITY: SOCIAL INSECURITY: ARE THERE ANY APPARENT SIGNS OF INJURIES/BEHAVIORS THAT COULD BE RELATED TO ABUSE/NEGLECT?: NO

## 2024-04-23 SDOH — SOCIAL STABILITY: SOCIAL INSECURITY: DO YOU FEEL ANYONE HAS EXPLOITED OR TAKEN ADVANTAGE OF YOU FINANCIALLY OR OF YOUR PERSONAL PROPERTY?: NO

## 2024-04-23 SDOH — SOCIAL STABILITY: SOCIAL INSECURITY: DOES ANYONE TRY TO KEEP YOU FROM HAVING/CONTACTING OTHER FRIENDS OR DOING THINGS OUTSIDE YOUR HOME?: NO

## 2024-04-23 SDOH — SOCIAL STABILITY: SOCIAL INSECURITY: ABUSE: ADULT

## 2024-04-23 SDOH — SOCIAL STABILITY: SOCIAL INSECURITY: HAS ANYONE EVER THREATENED TO HURT YOUR FAMILY OR YOUR PETS?: NO

## 2024-04-23 SDOH — SOCIAL STABILITY: SOCIAL INSECURITY: ARE YOU OR HAVE YOU BEEN THREATENED OR ABUSED PHYSICALLY, EMOTIONALLY, OR SEXUALLY BY ANYONE?: NO

## 2024-04-23 SDOH — SOCIAL STABILITY: SOCIAL INSECURITY: DO YOU FEEL UNSAFE GOING BACK TO THE PLACE WHERE YOU ARE LIVING?: NO

## 2024-04-23 SDOH — SOCIAL STABILITY: SOCIAL INSECURITY: WERE YOU ABLE TO COMPLETE ALL THE BEHAVIORAL HEALTH SCREENINGS?: YES

## 2024-04-23 SDOH — SOCIAL STABILITY: SOCIAL INSECURITY: HAVE YOU HAD ANY THOUGHTS OF HARMING ANYONE ELSE?: NO

## 2024-04-23 SDOH — SOCIAL STABILITY: SOCIAL INSECURITY: HAVE YOU HAD THOUGHTS OF HARMING ANYONE ELSE?: NO

## 2024-04-23 ASSESSMENT — ACTIVITIES OF DAILY LIVING (ADL)
JUDGMENT_ADEQUATE_SAFELY_COMPLETE_DAILY_ACTIVITIES: YES
DRESSING YOURSELF: NEEDS ASSISTANCE
ADEQUATE_TO_COMPLETE_ADL: YES
TOILETING: INDEPENDENT
FEEDING YOURSELF: INDEPENDENT
HEARING - RIGHT EAR: FUNCTIONAL
PATIENT'S MEMORY ADEQUATE TO SAFELY COMPLETE DAILY ACTIVITIES?: YES
BATHING: INDEPENDENT
LACK_OF_TRANSPORTATION: PATIENT DECLINED
HEARING - LEFT EAR: FUNCTIONAL
WALKS IN HOME: INDEPENDENT
GROOMING: INDEPENDENT

## 2024-04-23 ASSESSMENT — PAIN SCALES - GENERAL
PAINLEVEL_OUTOF10: 7
PAINLEVEL_OUTOF10: 9
PAINLEVEL_OUTOF10: 7
PAINLEVEL_OUTOF10: 8
PAINLEVEL_OUTOF10: 6
PAINLEVEL_OUTOF10: 8
PAINLEVEL_OUTOF10: 9
PAINLEVEL_OUTOF10: 6

## 2024-04-23 ASSESSMENT — COGNITIVE AND FUNCTIONAL STATUS - GENERAL
HELP NEEDED FOR BATHING: A LITTLE
DAILY ACTIVITIY SCORE: 20
TOILETING: A LITTLE
DRESSING REGULAR UPPER BODY CLOTHING: A LITTLE
PATIENT BASELINE BEDBOUND: NO
DRESSING REGULAR LOWER BODY CLOTHING: A LITTLE
MOBILITY SCORE: 24

## 2024-04-23 ASSESSMENT — PAIN DESCRIPTION - LOCATION
LOCATION: WRIST
LOCATION: WRIST

## 2024-04-23 ASSESSMENT — PAIN SCALES - PAIN ASSESSMENT IN ADVANCED DEMENTIA (PAINAD)
BODYLANGUAGE: RELAXED
BREATHING: NORMAL
FACIALEXPRESSION: SMILING OR INEXPRESSIVE
TOTALSCORE: 0
CONSOLABILITY: NO NEED TO CONSOLE

## 2024-04-23 ASSESSMENT — LIFESTYLE VARIABLES
HOW OFTEN DO YOU HAVE 6 OR MORE DRINKS ON ONE OCCASION: NEVER
SKIP TO QUESTIONS 9-10: 1
HOW OFTEN DO YOU HAVE A DRINK CONTAINING ALCOHOL: NEVER
AUDIT-C TOTAL SCORE: 0
HOW MANY STANDARD DRINKS CONTAINING ALCOHOL DO YOU HAVE ON A TYPICAL DAY: PATIENT DOES NOT DRINK
AUDIT-C TOTAL SCORE: 0

## 2024-04-23 ASSESSMENT — PAIN - FUNCTIONAL ASSESSMENT
PAIN_FUNCTIONAL_ASSESSMENT: 0-10

## 2024-04-23 ASSESSMENT — PAIN DESCRIPTION - ORIENTATION
ORIENTATION: LEFT
ORIENTATION: LEFT

## 2024-04-23 ASSESSMENT — PATIENT HEALTH QUESTIONNAIRE - PHQ9
SUM OF ALL RESPONSES TO PHQ9 QUESTIONS 1 & 2: 0
1. LITTLE INTEREST OR PLEASURE IN DOING THINGS: NOT AT ALL
2. FEELING DOWN, DEPRESSED OR HOPELESS: NOT AT ALL

## 2024-04-23 NOTE — PROGRESS NOTES
04/23/24 1106   Discharge Planning   Living Arrangements Spouse/significant other   Type of Residence Private residence   Home or Post Acute Services None   Patient expects to be discharged to: home   Does the patient need discharge transport arranged? No     Met with patient at bedside. Admitted for left wrist fusion. Pt lives with spouse and was independent PTA with no HHC or DME. PCP is Dr Kenney. Pt is able to manage his health, drive to appointments and obtain medications. Pt plans to return home with no new discharge needs. Family will provide transport home.

## 2024-04-23 NOTE — BRIEF OP NOTE
Date: 2024  OR Location: [facility] OR    Name: Sundar Gentile, : 1965, Age: 58 y.o., MRN: [id number], Sex: male    Diagnosis  Pre-op Diagnosis     * Post-traumatic arthritis of wrist, left [M19.132] Post-op Diagnosis     * Post-traumatic arthritis of wrist, left [M19.132]     Procedures  Left wrist arthrodesis/fusion with plate and screws and autologous bone graft    Surgeons   Dr. Mayur Williamson MD    Resident/Fellow/Other Assistant:  Shreyas Rocha, Haley Massey, and Robert Kasper  Procedure Summary  Anesthesia: General  ASA: II  Anesthesia Staff: Anesthesiologist: Mark Floyd DO  CRNA: Keiry Naylor, JANAE-CRNA; June Jack APRN-CRNA, DNP; JANAE Rodgers-CRNA  Estimated Blood Loss: Less than 30 mL  Intra-op Medications:   Administrations occurring from  to  on 24:   Medication Name Total Dose   sodium chloride 0.9 % irrigation solution 1,000 mL   ceFAZolin in dextrose (iso-os) (Ancef) IVPB 2 g 2 g   HYDROmorphone (Dilaudid) injection 0.4 mg 1 mg              Anesthesia Record               Intraprocedure I/O Totals          Intake    Propofol Drip 0.00 mL    The total shown is the total volume documented since Anesthesia Start was filed.    LR infusion 1600.00 mL    ceFAZolin in dextrose (iso-os) (Ancef) IVPB 2 g 200.00 mL    Total Intake 1800 mL          Specimen:   ID Type Source Tests Collected by Time   1 : Left wrist extensor synovium Tissue [facility] EX[facility] Mayur Williamson MD 2024   2 : Left wrist implant Tissue [facility] EXAM Mayur Williamson MD 2024        Staff:   Circulator: Kalli Sheihk RN  Relief Circulator: Edita Castaneda RN  Relief Scrub: Blanca Noguera; Bunny HENDERSON Case  Scrub Person: Mark Chowdhury; Gabbi Couch          Findings: Left wrist dysfunction deformity, scar tissue involving extensor tendons, wrist capsule, failed implant, significant arthritis    Complications:  None;  patient tolerated the procedure well.     Disposition: PACU - hemodynamically stable.  Condition: stable  Specimens Collected:   ID Type Source Tests Collected by Time   1 : Left wrist extensor synovium Tissue [facility] EXAM Mayur Williamson MD 4/22/2024 1940   2 : Left wrist implant Tissue [facility] EXAM Mayur Williamson MD 4/22/2024 1942     Attending Attestation: I was present and scrubbed for the entire procedure.    Mayur Williamson  Phone Number: [phone]

## 2024-04-23 NOTE — CARE PLAN
The patient's goals for the shift include      The clinical goals for the shift include pain control     Pt discharge education completed. Pt & pt's wife voiced understanding . IV removed.     Vital signs are stable, pt awaiting wheelchair ride down to the car to head home with wife

## 2024-04-23 NOTE — DISCHARGE INSTRUCTIONS
Post-Operative Instructions    These discharge instructions provide you with general information on caring for yourself after you leave the hospital. Your doctor may also give you specific instructions. If you have any problems or questions after discharge, please call Dr. Williamson office [phone].  Home Going Instructions:  Take over-the-counter or prescription medications for pain as directed. Resume your usual medications at home.   Keep your hand raised (elevated) for 2-3 days on 4-5 pillows above the level of your heart as much possible even when sleeping. This keeps swelling down and helps with discomfort.  Gently move your fingers to prevent stiffness, DO NOT USE HAND. Straighten fingers to full extension. No gripping weights, grabbing, pushing, pulling, lifting or carrying.  Use hand for very minimal activity. Ex: Buttons, Zippers.  When showering cover hand with a plastic bag to keep the dressing clean, dry and intact.   DO NOT CHANGE DRESSING, Dr. Williamson will remove dressing in his office during the follow up visit in 3-4 days.  Call your Doctor at his office if:  You develop severe pain not relieved by medications.  You develop bleeding from your surgical site.  You have an oral temperature about 101°F.  You develop redness or swelling of the surgical site.  SEEK IMMEDIATE MEDICAL CARE IF: You have chest pain, difficulty breathy, and/or if you develop a reaction or side effects to medication given.  For Your Safety since you were given sedation/anesthesia and will be taking pain medication:  Someone should stay with you for 24 hours.  Change positions slowly.

## 2024-04-23 NOTE — ANESTHESIA PROCEDURE NOTES
Peripheral Block    Patient location during procedure: pre-op  Start time: 4/22/2024 9:25 PM  End time: 4/22/2024 9:27 PM  Reason for block: post-op pain management  Staffing  Performed: resident   Authorized by: Mark Floyd DO    Performed by: Mark Floyd DO  Preanesthetic Checklist  Completed: patient identified, IV checked, site marked, risks and benefits discussed, surgical consent, monitors and equipment checked, pre-op evaluation and timeout performed   Timeout performed at: 4/22/2024 9:20 PM  Peripheral Block  Patient position: laying flat  Prep: ChloraPrep  Patient monitoring: heart rate and continuous pulse ox  Block type: axillary  Injection technique: single-shot  Guidance: ultrasound guided  Local infiltration: ropivacaine  Needle  Needle type: Tuohy   Needle gauge: 26 G  Needle length: 5 cm  Needle localization: ultrasound guidance     image stored in chart  Assessment  Injection assessment: negative aspiration for heme, no paresthesia on injection, incremental injection and local visualized surrounding nerve on ultrasound  Additional Notes  Axillary block: Informed consent obtained.  Risks, benefits, and alternatives discussed.  ASA monitors placed, timeout performed.  Patient positioned, prepped with chlorhexidine, and draped with sterile towels.  Ultrasound guidance used to visualize the brachial plexus and surrounding structures with visualization of the needle throughout duration of the procedure.  Aspiration was negative.  15 cc of 0.5% ropivacaine injected.  Patient tolerated procedure well.    Timeout by 2120

## 2024-04-23 NOTE — ANESTHESIA POSTPROCEDURE EVALUATION
Patient: Sundar Gentile    Procedure Summary       Date: 04/22/24 Room / Location: [facility] OR 05 / [facility] OR    Anesthesia Start: 1606 Anesthesia Stop: 2144    Procedure: Arthrodesis Wrist (Left) Diagnosis:       Post-traumatic arthritis of wrist, left      (Post-traumatic arthritis of wrist, left [M19.132])    Surgeons: Mayur Williamson MD Responsible Provider: Mark Floyd DO    Anesthesia Type: general ASA Status: 2            Anesthesia Type: general    Vitals Value Taken Time   /78 04/22/24 2200   Temp 36.1 °C (97 °F) 04/22/24 2136   Pulse 106 04/22/24 2203   Resp 16 04/22/24 2203   SpO2 96 % 04/22/24 2203   Vitals shown include unfiled device data.    Anesthesia Post Evaluation    Patient location during evaluation: PACU  Patient participation: complete - patient participated  Level of consciousness: awake  Pain score: 0  Pain management: adequate  Multimodal analgesia pain management approach  Airway patency: patent  Two or more strategies used to mitigate risk of obstructive sleep apnea  Cardiovascular status: acceptable  Respiratory status: acceptable  Hydration status: acceptable  Postoperative Nausea and Vomiting: none        No notable events documented.

## 2024-04-23 NOTE — PROGRESS NOTES
HPI: Patient is seen with his wife today at 12:30 PM.  He states that his pain is now under control.  He is tolerating his diet.  He would like to be discharged home.    Physical exam  The left hand and wrist are examined.  The dressing and splint are removed.  Wound is cleaned and dried.  The Penrose drain is removed.  There is minimal edema.  There is scant serosanguineous discharge from the wound.  Sutures are clean dry and intact.  Sensation on the dorsal skin is intact.  Sensation of the digits is intact.  He demonstrates limited range of motion of the thumb index middle ring and small fingers due to discomfort.  The wrist is not placed through range of motion at this time.  The remainder the exam is within normal limits for him.    Assessment and plan: Status post exploration dorsal left wrist, extensor tenolysis/synovectomy, arthrotomy with removal of failed implant and left wrist fusion with Synthes plate and screws and autologous bone graft.  He demonstrates improved pain control at this time.  Dressing changes performed.  The wound is cleaned and dried.  The Penrose drain is removed.  The dressing is reapplied.  The splint is reapplied.  He is instructed further appropriate care of this wound.  He is instructed to maintain dressing and splint.  He is instructed to take his oral antibiotics as prescribed.  He is instructed in his pain control and that he may use Motrin as well over-the-counter and prescription is supplied.  He is instructed to keep the left hand wrist and forearm elevated as well as clean, dry, and protected.  He is instructed in no use and activity of the left hand/wrist/forearm.  He understands all of his instructions.  He will be discharged home with the appropriate instructions and will follow-up with me on Thursday, April 25, 2024 or sooner if needed.

## 2024-05-01 ENCOUNTER — EVALUATION (OUTPATIENT)
Dept: OCCUPATIONAL THERAPY | Facility: CLINIC | Age: 59
End: 2024-05-01
Payer: COMMERCIAL

## 2024-05-01 DIAGNOSIS — G89.18 POST-OPERATIVE PAIN: ICD-10-CM

## 2024-05-01 DIAGNOSIS — M25.532 LEFT WRIST PAIN: Primary | ICD-10-CM

## 2024-05-01 DIAGNOSIS — Z48.89 ENCOUNTER FOR POSTOPERATIVE CARE RELATED TO SURGICAL JOINT FUSION: ICD-10-CM

## 2024-05-01 DIAGNOSIS — Z98.1 ENCOUNTER FOR POSTOPERATIVE CARE RELATED TO SURGICAL JOINT FUSION: ICD-10-CM

## 2024-05-01 DIAGNOSIS — M19.132 POST-TRAUMATIC ARTHRITIS OF WRIST, LEFT: ICD-10-CM

## 2024-05-01 PROCEDURE — 97165 OT EVAL LOW COMPLEX 30 MIN: CPT | Mod: GO

## 2024-05-01 PROCEDURE — 97760 ORTHOTIC MGMT&TRAING 1ST ENC: CPT | Mod: GO

## 2024-05-01 ASSESSMENT — ENCOUNTER SYMPTOMS
OCCASIONAL FEELINGS OF UNSTEADINESS: 0
LOSS OF SENSATION IN FEET: 0
DEPRESSION: 0

## 2024-05-01 NOTE — PROGRESS NOTES
"Occupational Therapy             Patient Name: Sundar Gentile  MRN: [id number]  Today's Date: 5/1/2024     Comment: Diagnosis  Pre-op Diagnosis     * Post-traumatic arthritis of wrist, left [M19.132] Post-op Diagnosis     * Post-traumatic arthritis of wrist, left [M19.132]      Procedures  Left wrist arthrodesis/fusion with plate and screws and autologous bone graft     Surgeons   Dr. Mayur Williamson MD    Reason For Visit    Initial Evaluation, Evaluation and Treatment, Orthosis.     Adult Risk Screening  There are no spiritual/cultural practices/values/needs that are important to know.  No apparent abuse or neglect is noted, no suicidal ideation or self-harm plans referenced.    The client has not fallen in the last 6 months.  The client  does not have a fear of falling. Does not need assistance with sitting, standing or walking. Does not need assistance walking in her home. Does not need assistance in an unfamiliar setting. The patient is not using an assistive device.          Insurance: reviewed     Client Input  \" The doctor wants me to have a splint.\"  The patient's primary form of communication is English. There are no language barriers. Social interaction is appropriate with good interactive skills noted.    Plan of care was developed with input and agreement by the patient, guardian, parent.     Client's primary Goal: Return to previous level of function and independence.    Difficulty With: Movement, Self Care, Home Management, Work/School, Recreation/Play Activity, ADL'S    Chief Complaint  This is a 58 year old patient evaluated for a custom splint to protect and position the left wrist.  The patient's primary form of communication is English. There are no language barriers.  Social interaction is appropriate with good interactive skills noted.    Difficulty With Movement, Self Care, Home Management, Work/School, Recreation/Play Activity, ADL'S    Past Med/Surgical History: Reviewed  Current " Medications  Please see patient questionnaire for current medications.    Objective  The patient was provided with a Polyflex II splint to protect and position the left wrist.  This was attached with Velcro hook and loop closures.  It was custom fit to the hand to provide comfort and stability and joint protection.  The patient was educated on the application and removal of the splint along with exercises to be performed within the confines of the splint.      Assessment  This patient presents with a surgical intervention to the left wrist and has displayed appropriate coping abilities in dealing with the effects of this surgery and pain.  Standardized testing and measures administered today, including Quick Dash, reveal that the patient has a single impairment in body structures and functions, activity limitations, and participation restrictions.  The Quick Dash was scored at 48..  Subjective and objective findings reveal stable & uncomplicated characteristics in the patient's independence.  The client was independent prior to the onset of this is impairment.  No apparent abuse or neglect is noted.  Areas affected by this include limited self-care skills, decreased body image, and limited muscle strength, limited active range of motion, limited abilities for carrying, moving, and handling objects.  The patient has 1 personal factors and co-morbidities that may serve as barriers affecting the plan of care, including gender, age.  The patient's clinical presentation includes stable & uncomplicated characteristics as noted during today's evaluation, including hand pain that has been consistent since the surgery and limiting function.  Hand mobility is decreased with joint mechanics disruption for bending and active movement patterns. Treatment options vary for this client with various treatment protocols available.  Specific physician protocols and activities for a safe return to function will be utilized as  available.  Time spent evaluating, splinting this client and assessing limitations was 40 minutes.  The combination of these findings indicate that this patient has 2 performance deficits and is of low complexity, and skilled OT services are warranted in order to realize measurable change in the above outcome measures and achieve improvements in the patient's functional status and individual goals.   Tendon and ligament changes are also noted in the stiffness of the hand and the mobility changes.  The patient was seen for an initial occupational therapy evaluation on 05/01/2024.  The patient was provided with the appropriate Polyflex II splint.  The patient demonstrated independence with its application, removal, and understanding of the wear and care schedule.  It is foreseen that the patient will need occupational therapy after the splint is removed.  The patient verbalized understanding and is in agreement with all goals and plan of care. If other interventions are to be initiated an order will be provided from primary referring physician.       Plan:  It is foreseen that the patient will make good gains with active rehabilitation and at the conclusion of his immobilization period.   Rehabilitation potential is good for this patient with PRN follow-up visits needed over the next 10-12 weeks. Splint adjustments and additional Velcro issuing will be provided as needed. If additional occupational therapy needs arise, this therapist will follow this patient.    Goals:  Problem #1:  Need for splint  Goal #1:  The patient to demonstrate with 100% accuracy the application and removal of splint to the left wrist.  Treatment Intervention:  Splinting and splinting education.    Problem #2:  Decreased home exercise program knowledge  Goal #2:  The patient to demonstrate with 100% accuracy exercises to prevent joint deformities and contractures throughout the left hand and wrist hand.  Treatment Intervention:  Home exercise  program education along with range-of-motion activities education.    Problem #3:  Increased Quick DASH score  Goal #3:  The patient to demonstrate an increase in left upper extremity function as indicated by decreased Quick DASH score ranging between 11-22 at the conclusion of all treatment sessions. Current Quick DASH score is 48.    Time in clinic started at: 1010  Time in clinic ended at:  1050  28685 - OT Eval Low Complexity 20 min.  70034 Orthotic Fit and Train 20 minutes. *    Total time in clinic is minutes: 20     BELÉN Villanueva/SANTIAGO, CHT   1. Encounter for postoperative care related to surgical joint fusion  Referral to Occupational Therapy

## 2024-05-02 LAB
ATRIAL RATE: 55 BPM
LABORATORY COMMENT REPORT: NORMAL
P AXIS: 30 DEGREES
P OFFSET: 172 MS
P ONSET: 104 MS
PATH REPORT.FINAL DX SPEC: NORMAL
PATH REPORT.GROSS SPEC: NORMAL
PATH REPORT.RELEVANT HX SPEC: NORMAL
PATH REPORT.TOTAL CANCER: NORMAL
PR INTERVAL: 192 MS
Q ONSET: 200 MS
QRS COUNT: 9 BEATS
QRS DURATION: 144 MS
QT INTERVAL: 438 MS
QTC CALCULATION(BAZETT): 419 MS
QTC FREDERICIA: 425 MS
R AXIS: -53 DEGREES
T AXIS: -10 DEGREES
T OFFSET: 419 MS
VENTRICULAR RATE: 55 BPM

## 2024-05-06 ENCOUNTER — APPOINTMENT (OUTPATIENT)
Dept: OCCUPATIONAL THERAPY | Facility: CLINIC | Age: 59
End: 2024-05-06

## 2024-07-11 ENCOUNTER — TELEPHONE (OUTPATIENT)
Dept: PAIN MEDICINE | Facility: CLINIC | Age: 59
End: 2024-07-11
Payer: COMMERCIAL

## 2024-07-11 NOTE — TELEPHONE ENCOUNTER
Patient is requesting to have a left stellate ganglion block with you.  Please advise how you would like to proceed.  Last office visit was 4/8/24. Thank you    Best #[phone].

## 2024-07-16 ENCOUNTER — APPOINTMENT (OUTPATIENT)
Dept: PAIN MEDICINE | Facility: CLINIC | Age: 59
End: 2024-07-16
Payer: COMMERCIAL

## 2024-08-20 ENCOUNTER — HOSPITAL ENCOUNTER (OUTPATIENT)
Facility: HOSPITAL | Age: 59
Setting detail: OUTPATIENT SURGERY
End: 2024-08-20
Payer: COMMERCIAL

## 2024-08-20 DIAGNOSIS — G56.01 RIGHT CARPAL TUNNEL SYNDROME: Primary | ICD-10-CM

## 2024-09-04 ENCOUNTER — APPOINTMENT (OUTPATIENT)
Dept: PREADMISSION TESTING | Facility: HOSPITAL | Age: 59
End: 2024-09-04
Payer: COMMERCIAL

## 2024-09-06 ENCOUNTER — PRE-ADMISSION TESTING (OUTPATIENT)
Dept: PREADMISSION TESTING | Facility: HOSPITAL | Age: 59
End: 2024-09-06
Payer: COMMERCIAL

## 2024-09-06 ENCOUNTER — LAB (OUTPATIENT)
Dept: LAB | Facility: LAB | Age: 59
End: 2024-09-06
Payer: COMMERCIAL

## 2024-09-06 VITALS
RESPIRATION RATE: 16 BRPM | BODY MASS INDEX: 32.72 KG/M2 | HEART RATE: 60 BPM | HEIGHT: 71 IN | DIASTOLIC BLOOD PRESSURE: 88 MMHG | OXYGEN SATURATION: 99 % | TEMPERATURE: 96.8 F | SYSTOLIC BLOOD PRESSURE: 130 MMHG | WEIGHT: 233.69 LBS

## 2024-09-06 DIAGNOSIS — Z01.818 PREOP EXAMINATION: Primary | ICD-10-CM

## 2024-09-06 DIAGNOSIS — G56.01 RIGHT CARPAL TUNNEL SYNDROME: ICD-10-CM

## 2024-09-06 DIAGNOSIS — Z01.818 PREOP EXAMINATION: ICD-10-CM

## 2024-09-06 DIAGNOSIS — Z96.651 PRESENCE OF RIGHT ARTIFICIAL KNEE JOINT: Primary | ICD-10-CM

## 2024-09-06 LAB
ANION GAP SERPL CALC-SCNC: 14 MMOL/L (ref 10–20)
BUN SERPL-MCNC: 20 MG/DL (ref 6–23)
CALCIUM SERPL-MCNC: 9.3 MG/DL (ref 8.6–10.3)
CHLORIDE SERPL-SCNC: 102 MMOL/L (ref 98–107)
CO2 SERPL-SCNC: 27 MMOL/L (ref 21–32)
CREAT SERPL-MCNC: 0.91 MG/DL (ref 0.5–1.3)
CRP SERPL-MCNC: <0.1 MG/DL
EGFRCR SERPLBLD CKD-EPI 2021: >90 ML/MIN/1.73M*2
ERYTHROCYTE [DISTWIDTH] IN BLOOD BY AUTOMATED COUNT: 13.2 % (ref 11.5–14.5)
ERYTHROCYTE [SEDIMENTATION RATE] IN BLOOD BY WESTERGREN METHOD: <1 MM/H (ref 0–20)
GLUCOSE SERPL-MCNC: 92 MG/DL (ref 74–99)
HCT VFR BLD AUTO: 46.7 % (ref 41–52)
HGB BLD-MCNC: 14.9 G/DL (ref 13.5–17.5)
MCH RBC QN AUTO: 31.1 PG (ref 26–34)
MCHC RBC AUTO-ENTMCNC: 31.9 G/DL (ref 32–36)
MCV RBC AUTO: 98 FL (ref 80–100)
NRBC BLD-RTO: 0 /100 WBCS (ref 0–0)
PLATELET # BLD AUTO: 185 X10*3/UL (ref 150–450)
POTASSIUM SERPL-SCNC: 4.6 MMOL/L (ref 3.5–5.3)
RBC # BLD AUTO: 4.79 X10*6/UL (ref 4.5–5.9)
SODIUM SERPL-SCNC: 138 MMOL/L (ref 136–145)
WBC # BLD AUTO: 5.7 X10*3/UL (ref 4.4–11.3)

## 2024-09-06 PROCEDURE — 36415 COLL VENOUS BLD VENIPUNCTURE: CPT

## 2024-09-06 PROCEDURE — 86140 C-REACTIVE PROTEIN: CPT

## 2024-09-06 PROCEDURE — 85027 COMPLETE CBC AUTOMATED: CPT

## 2024-09-06 PROCEDURE — 80048 BASIC METABOLIC PNL TOTAL CA: CPT

## 2024-09-06 PROCEDURE — 85652 RBC SED RATE AUTOMATED: CPT

## 2024-09-06 ASSESSMENT — DUKE ACTIVITY SCORE INDEX (DASI)
CAN YOU WALK INDOORS, SUCH AS AROUND YOUR HOUSE: YES
CAN YOU DO YARD WORK LIKE RAKING LEAVES, WEEDING OR PUSHING A MOWER: YES
CAN YOU DO MODERATE WORK AROUND THE HOUSE LIKE VACUUMING, SWEEPING FLOORS OR CARRYING GROCERIES: YES
CAN YOU HAVE SEXUAL RELATIONS: YES
CAN YOU WALK A BLOCK OR TWO ON LEVEL GROUND: YES
CAN YOU DO HEAVY WORK AROUND THE HOUSE LIKE SCRUBBING FLOORS OR LIFTING AND MOVING HEAVY FURNITURE: YES
CAN YOU PARTICIPATE IN STRENOUS SPORTS LIKE SWIMMING, SINGLES TENNIS, FOOTBALL, BASKETBALL, OR SKIING: YES
TOTAL_SCORE: 58.2
CAN YOU DO LIGHT WORK AROUND THE HOUSE LIKE DUSTING OR WASHING DISHES: YES
CAN YOU TAKE CARE OF YOURSELF (EAT, DRESS, BATHE, OR USE TOILET): YES
CAN YOU RUN A SHORT DISTANCE: YES
CAN YOU CLIMB A FLIGHT OF STAIRS OR WALK UP A HILL: YES
DASI METS SCORE: 9.9
CAN YOU PARTICIPATE IN MODERATE RECREATIONAL ACTIVITIES LIKE GOLF, BOWLING, DANCING, DOUBLES TENNIS OR THROWING A BASEBALL OR FOOTBALL: YES

## 2024-09-06 ASSESSMENT — ACTIVITIES OF DAILY LIVING (ADL)
ADL_SCORE: 0
ADL_SCORE: 0

## 2024-09-06 ASSESSMENT — LIFESTYLE VARIABLES: SMOKING_STATUS: NONSMOKER
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
## 2024-09-06 NOTE — PREPROCEDURE INSTRUCTIONS
Thank you for visiting Preadmission Testing at [facility]. If you have any changes to your health condition, please call the SURGEON's office to alert them and give them details of your symptoms.        Preoperative Brain Exercises    What are brain exercises?  A brain exercise is any activity that engages your thinking (cognitive) skills.    What types of activities are considered brain exercises?  Jigsaw puzzles, crossword puzzles, word jumble, memory games, word search, and many more.  Many can be found free online or on your phone via a mobile shabbir.    Why should I do brain exercises before my surgery?  More recent research has shown brain exercise before surgery can lower the risk of postoperative delirium (confusion) which can be especially important for older adults.  Patients who did brain exercises for 5 to 10 hours the days before surgery, cut their risk of postoperative delirium in half up to 1 week after surgery.      Preoperative Deep Breathing Exercises    Why it is important to do deep breathing exercises before my surgery?  Deep breathing exercises strengthen your breathing muscles.  This helps you to recover after your surgery and decreases the chance of breathing complications.    How are the deep breathing exercises done?  Sit straight with your back supported.  Breathe in deeply and slowly through your nose. Your lower rib cage should expand and your abdomen may move forward.  Hold that breath for 3 to 5 seconds.  Breathe out through pursed lips, slowly and completely.  Rest and repeat 10 times every hour while awake.  Rest longer if you become dizzy or lightheaded.      Patient and Family Education   Ways You Can Help Prevent Blood Clots     This handout explains some simple things you can do to help prevent blood clots.      Blood clots are blockages that can form in the body's veins. When a blood clot forms in your deep veins, it may be called a deep vein thrombosis, or DVT for short. Blood clots can  happen in any part of the body where blood flows, but they are most common in the arms and legs. If a piece of a blood clot breaks free and travels to the lungs, it is called a pulmonary embolus (PE). A PE can be a very serious problem.      Being in the hospital or having surgery can raise your chances of getting a blood clot because you may not be well enough to move around as much as you normally do.      Ways you can help prevent blood clots in the hospital         Wearing SCDs. SCDs stands for Sequential Compression Devices.   SCDs are special sleeves that wrap around your legs  They attach to a pump that fills them with air to gently squeeze your legs every few minutes.   This helps return the blood in your legs to your heart.   SCDs should only be taken off when walking or bathing.   SCDs may not be comfortable, but they can help save your life.               Wearing compression stockings - if your doctor orders them. These special snug fitting stockings gently squeeze your legs to help blood flow.       Walking. Walking helps move the blood in your legs.   If your doctor says it is ok, try walking the halls at least   5 times a day. Ask us to help you get up, so you don't fall.      Taking any blood thinning medicines your doctor orders.          ©[facility]; 3/23        Ways you can help prevent blood clots at home       Wearing compression stockings - if your doctor orders them. ? Walking - to help move the blood in your legs.       Taking any blood thinning medicines your doctor orders.      Signs of a blood clot or PE      Tell your doctor or nurse know right away if you have of the problems listed below.    If you are at home, seek medical care right away. Call 911 for chest pain or problems breathing.          Signs of a blood clot (DVT) - such as pain,  swelling, redness or warmth in your arm or leg      Signs of a pulmonary embolism (PE) - such as chest     pain or feeling short of breath

## 2024-09-06 NOTE — PREPROCEDURE INSTRUCTIONS
Medication List            Accurate as of September 6, 2024  8:21 AM. Always use your most recent med list.                atorvastatin 20 mg tablet  Commonly known as: Lipitor  Medication Adjustments for Surgery: Take/Use as prescribed     calcium-magnesium 300-300 mg tablet  Additional Medication Adjustments for Surgery: Take last dose 7 days before surgery     cyclobenzaprine 10 mg tablet  Commonly known as: Flexeril  Medication Adjustments for Surgery: Take/Use as prescribed     losartan 100 mg tablet  Commonly known as: Cozaar  Additional Medication Adjustments for Surgery: Other (Comment)  Notes to patient: HOLD any evening dose the night before the day of surgery  HOLD the day of surgery     NON FORMULARY  Additional Medication Adjustments for Surgery: Take last dose 7 days before surgery     NON FORMULARY  Additional Medication Adjustments for Surgery: Take last dose 7 days before surgery     oxyCODONE-acetaminophen 7.5-325 mg tablet  Commonly known as: Percocet  Medication Adjustments for Surgery: Take/Use as prescribed     PHENobarbitaL 60 mg tablet  Additional Medication Adjustments for Surgery: Other (Comment)  Notes to patient: Not taking      Vitamin C 500 mg chewable tablet  Generic drug: ascorbic acid  Additional Medication Adjustments for Surgery: Take last dose 7 days before surgery                                  PRE-OPERATIVE INSTRUCTIONS    You will receive notification one business day prior to your procedure to confirm your arrival time. It is important that you answer your phone and/or check your messages during this time. If you do not hear from the surgery center by 5 pm. the day before your procedure, please call [phone].     Please enter the building through the Outpatient entrance and take the elevator off the lobby to the 2nd floor then check in at the Outpatient Surgery desk on the 2nd floor.    INSTRUCTIONS:  Talk to your surgeon for instructions if you should stop your  aspirin, blood thinner, or diabetes medicines.  DO NOT take any multivitamins or over the counter supplements for 7-10 days before surgery.  If not being admitted, you must have an adult immediately available to drive you home after surgery. We also highly recommend you have someone stay with you for the entire day and night of your surgery.  For children having surgery, a parent or legal guardian must accompany them to the surgery center. If this is not possible, please call [phone] to make additional arrangements.  For adults who are unable to consent or make medical decisions for themselves, a legal guardian or Power of  must accompany them to the surgery center. If this is not possible, please call [phone] to make additional arrangements.  Wear comfortable, loose fitting clothing.  All jewelry and piercings must be removed. If you are unable to remove an item or have a dermal piercing, please be sure to tell the nurse when you arrive for surgery.  Nail polish and make-up must be removed.  Avoid smoking or consuming alcohol for 24 hours before surgery.  To help prevent infection, please take a shower/bath and wash your hair the night before and/or morning of surgery (or follow other specific bathing instructions provided).    Preoperative Fasting Guidelines    Why must I stop eating and drinking near surgery time?  With sedation, food or liquid in your stomach can enter your lungs causing serious complications  Increases nausea and vomiting    When do I need to stop eating and drinking before my surgery?  Do not eat any solid food after midnight the night before your surgery/procedure unless otherwise instructed by your surgeon.   You may have up to 13.5 ounces of clear liquid until TWO hours before your instructed arrival time to the hospital.  This includes water, black tea/coffee, (no milk or cream) apple juice, and electrolyte drinks (Gatorade).   You may chew gum until TWO hours before your  surgery/procedure      If applicable, notify your surgeons office immediately of any new skin changes that occur to the surgical limb.      If you have any questions or concerns, please call Pre-Admission Testing at [phone].           Home Preoperative Antibacterial Shower with Chlorhexidine gluconate (CHG)     What is a home preoperative antibacterial shower?  This shower is a way of cleaning the skin with a germ killing solution before surgery. The solution contains chlorhexidine gluconate, commonly known as CHG. CHG is a skin cleanser with germ killing ability. Let your doctor know if you are allergic to chlorhexidine.    Why do I need to take a preoperative antibacterial shower?  Skin is not sterile. It is best to try to make your skin as free of germs as possible before surgery. Proper cleansing with a germ killing soap before surgery can lower the number of germs on your skin. This helps to reduce the risk of infection at the surgical site. Following the instructions listed below will help you prepare your skin for surgery.    How do I use the solution?  Begin using your CHG soap the night before and again the morning of your procedure.   Do not shave the day before or day of surgery.  Remove all jewelry until after surgery. Take off rings and take out all body-piercing jewelry.  Wash your face and hair with normal soap and shampoo before you use the CHG soap.  Apply the CHG solution to a clean wet washcloth. Move away from the water to avoid premature rinsing of the CHG soap as you are applying. Firmly lather your entire body from the neck down. Do not use CHG on your face, eyes, ears, or genitals.   Pay special attention to the area where your incisions will be located.  Do not scrub your skin too hard.  It is important to allow the CHG soap to sit on your skin for 3-5 minutes.  Rinse the solution off your body completely. Do not wash with your normal soap after the CHG soap solution.  Pat yourself  dry with a clean, soft towel.  Do not apply powders, lotions or deodorants as these might block how the CHG soap works.   Dress in clean clothing.  Be sure to sleep with clean, freshly laundered sheets.  Be aware that CHG can cause stains on fabric. Rinse your washcloth and other linens that have contact with CHG completely. Use only non-chlorine detergents to launder the items used.
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
## 2024-09-16 DIAGNOSIS — G56.01 RIGHT CARPAL TUNNEL SYNDROME: Primary | ICD-10-CM

## 2024-09-20 ENCOUNTER — HOSPITAL ENCOUNTER (OUTPATIENT)
Facility: HOSPITAL | Age: 59
Setting detail: OUTPATIENT SURGERY
Discharge: HOME | End: 2024-09-20
Payer: COMMERCIAL

## 2024-09-20 ENCOUNTER — ANESTHESIA EVENT (OUTPATIENT)
Dept: OPERATING ROOM | Facility: HOSPITAL | Age: 59
End: 2024-09-20
Payer: COMMERCIAL

## 2024-09-20 ENCOUNTER — ANESTHESIA (OUTPATIENT)
Dept: OPERATING ROOM | Facility: HOSPITAL | Age: 59
End: 2024-09-20
Payer: COMMERCIAL

## 2024-09-20 VITALS
HEART RATE: 61 BPM | TEMPERATURE: 96.8 F | DIASTOLIC BLOOD PRESSURE: 80 MMHG | OXYGEN SATURATION: 97 % | RESPIRATION RATE: 16 BRPM | WEIGHT: 211 LBS | BODY MASS INDEX: 29.54 KG/M2 | SYSTOLIC BLOOD PRESSURE: 142 MMHG | HEIGHT: 71 IN

## 2024-09-20 DIAGNOSIS — Z79.2 PROPHYLACTIC ANTIBIOTIC: Primary | ICD-10-CM

## 2024-09-20 PROCEDURE — 3600000003 HC OR TIME - INITIAL BASE CHARGE - PROCEDURE LEVEL THREE

## 2024-09-20 PROCEDURE — 2500000004 HC RX 250 GENERAL PHARMACY W/ HCPCS (ALT 636 FOR OP/ED): Mod: JZ

## 2024-09-20 PROCEDURE — 2720000007 HC OR 272 NO HCPCS

## 2024-09-20 PROCEDURE — 7100000009 HC PHASE TWO TIME - INITIAL BASE CHARGE

## 2024-09-20 PROCEDURE — 2500000005 HC RX 250 GENERAL PHARMACY W/O HCPCS: Performed by: ANESTHESIOLOGIST ASSISTANT

## 2024-09-20 PROCEDURE — 3700000002 HC GENERAL ANESTHESIA TIME - EACH INCREMENTAL 1 MINUTE

## 2024-09-20 PROCEDURE — 2500000004 HC RX 250 GENERAL PHARMACY W/ HCPCS (ALT 636 FOR OP/ED): Performed by: ANESTHESIOLOGIST ASSISTANT

## 2024-09-20 PROCEDURE — 2500000004 HC RX 250 GENERAL PHARMACY W/ HCPCS (ALT 636 FOR OP/ED)

## 2024-09-20 PROCEDURE — 3700000001 HC GENERAL ANESTHESIA TIME - INITIAL BASE CHARGE

## 2024-09-20 PROCEDURE — 3600000008 HC OR TIME - EACH INCREMENTAL 1 MINUTE - PROCEDURE LEVEL THREE

## 2024-09-20 PROCEDURE — 2500000005 HC RX 250 GENERAL PHARMACY W/O HCPCS

## 2024-09-20 RX ORDER — ACETAMINOPHEN 325 MG/1
650 TABLET ORAL EVERY 4 HOURS PRN
Status: CANCELLED | OUTPATIENT
Start: 2024-09-20

## 2024-09-20 RX ORDER — FENTANYL CITRATE 50 UG/ML
INJECTION, SOLUTION INTRAMUSCULAR; INTRAVENOUS AS NEEDED
Status: DISCONTINUED | OUTPATIENT
Start: 2024-09-20 | End: 2024-09-20

## 2024-09-20 RX ORDER — ONDANSETRON HYDROCHLORIDE 2 MG/ML
4 INJECTION, SOLUTION INTRAVENOUS ONCE AS NEEDED
Status: CANCELLED | OUTPATIENT
Start: 2024-09-20

## 2024-09-20 RX ORDER — OXYCODONE HYDROCHLORIDE 10 MG/1
10 TABLET ORAL EVERY 4 HOURS PRN
Status: CANCELLED | OUTPATIENT
Start: 2024-09-20

## 2024-09-20 RX ORDER — LIDOCAINE HYDROCHLORIDE 10 MG/ML
0.1 INJECTION, SOLUTION INFILTRATION; PERINEURAL ONCE
Status: CANCELLED | OUTPATIENT
Start: 2024-09-20 | End: 2024-09-20

## 2024-09-20 RX ORDER — LIDOCAINE HYDROCHLORIDE 20 MG/ML
INJECTION, SOLUTION INFILTRATION; PERINEURAL AS NEEDED
Status: DISCONTINUED | OUTPATIENT
Start: 2024-09-20 | End: 2024-09-20 | Stop reason: HOSPADM

## 2024-09-20 RX ORDER — SULFAMETHOXAZOLE AND TRIMETHOPRIM 800; 160 MG/1; MG/1
1 TABLET ORAL 2 TIMES DAILY
Qty: 20 TABLET | Refills: 0 | Status: SHIPPED | OUTPATIENT
Start: 2024-09-20 | End: 2024-09-30

## 2024-09-20 RX ORDER — OXYCODONE HYDROCHLORIDE 5 MG/1
5 TABLET ORAL EVERY 4 HOURS PRN
Status: CANCELLED | OUTPATIENT
Start: 2024-09-20

## 2024-09-20 RX ORDER — LABETALOL HYDROCHLORIDE 5 MG/ML
5 INJECTION, SOLUTION INTRAVENOUS ONCE AS NEEDED
Status: CANCELLED | OUTPATIENT
Start: 2024-09-20

## 2024-09-20 RX ORDER — CEFAZOLIN SODIUM 2 G/100ML
2 INJECTION, SOLUTION INTRAVENOUS ONCE
Status: COMPLETED | OUTPATIENT
Start: 2024-09-20 | End: 2024-09-20

## 2024-09-20 RX ORDER — PROPOFOL 10 MG/ML
INJECTION, EMULSION INTRAVENOUS CONTINUOUS PRN
Status: DISCONTINUED | OUTPATIENT
Start: 2024-09-20 | End: 2024-09-20

## 2024-09-20 RX ORDER — HYDROMORPHONE HYDROCHLORIDE 0.2 MG/ML
0.2 INJECTION INTRAMUSCULAR; INTRAVENOUS; SUBCUTANEOUS EVERY 5 MIN PRN
Status: CANCELLED | OUTPATIENT
Start: 2024-09-20

## 2024-09-20 RX ORDER — BUPIVACAINE HYDROCHLORIDE 2.5 MG/ML
INJECTION, SOLUTION EPIDURAL; INFILTRATION; INTRACAUDAL AS NEEDED
Status: DISCONTINUED | OUTPATIENT
Start: 2024-09-20 | End: 2024-09-20 | Stop reason: HOSPADM

## 2024-09-20 RX ORDER — SODIUM CHLORIDE, SODIUM LACTATE, POTASSIUM CHLORIDE, CALCIUM CHLORIDE 600; 310; 30; 20 MG/100ML; MG/100ML; MG/100ML; MG/100ML
100 INJECTION, SOLUTION INTRAVENOUS CONTINUOUS
Status: CANCELLED | OUTPATIENT
Start: 2024-09-20

## 2024-09-20 RX ORDER — ONDANSETRON HYDROCHLORIDE 2 MG/ML
INJECTION, SOLUTION INTRAVENOUS AS NEEDED
Status: DISCONTINUED | OUTPATIENT
Start: 2024-09-20 | End: 2024-09-20

## 2024-09-20 RX ORDER — LIDOCAINE HYDROCHLORIDE 5 MG/ML
INJECTION, SOLUTION INFILTRATION; INTRAVENOUS AS NEEDED
Status: DISCONTINUED | OUTPATIENT
Start: 2024-09-20 | End: 2024-09-20

## 2024-09-20 RX ORDER — GLYCOPYRROLATE 0.2 MG/ML
INJECTION INTRAMUSCULAR; INTRAVENOUS AS NEEDED
Status: DISCONTINUED | OUTPATIENT
Start: 2024-09-20 | End: 2024-09-20

## 2024-09-20 RX ORDER — MIDAZOLAM HYDROCHLORIDE 1 MG/ML
INJECTION, SOLUTION INTRAMUSCULAR; INTRAVENOUS AS NEEDED
Status: DISCONTINUED | OUTPATIENT
Start: 2024-09-20 | End: 2024-09-20

## 2024-09-20 ASSESSMENT — PAIN SCALES - GENERAL
PAINLEVEL_OUTOF10: 7
PAINLEVEL_OUTOF10: 0 - NO PAIN

## 2024-09-20 ASSESSMENT — PAIN - FUNCTIONAL ASSESSMENT: PAIN_FUNCTIONAL_ASSESSMENT: 0-10

## 2024-09-20 NOTE — OP NOTE
Decompression Median Nerve with Carpal Tunnel Release (R) Operative Note     Date: 2024  OR Location: [facility] OR    Name: Sundar Gentile, : 1965, Age: 58 y.o., MRN: [id number], Sex: male    Diagnosis  Pre-op Diagnosis      * Right carpal tunnel syndrome [G56.01] Post-op Diagnosis     * Right carpal tunnel syndrome [G56.01]     Procedures  Decompression Median Nerve with Carpal Tunnel Release  19028 - ME NEUROPLASTY &/TRANSPOS MEDIAN NRV CARPAL TUNNE      Surgeons   Dr. Mayur Williamson MD    Resident/Fellow/Other Assistant:  Delia Mace    Procedure Summary  Anesthesia: Regional, Monitor Anesthesia Care Kacie block with sedation ASA: II  Anesthesia Staff: Anesthesiologist: Neal Davis DO  C-AA: GERARD Díaz  ELZA: Hailey German  Estimated Blood Loss: Less than 10 mL  Intra-op Medications:   Administrations occurring from 1215 to 1410 on 24:   Medication Name Total Dose   bupivacaine PF (Marcaine) 0.25 % (2.5 mg/mL) injection 6 mL   lidocaine (Xylocaine) 20 mg/mL (2 %) injection 6 mL   ceFAZolin (Ancef) 2 g in dextrose (iso)  mL 2 g              Anesthesia Record               Intraprocedure I/O Totals          Intake    LR bolus 500.00 mL    Total Intake 500 mL          Specimen: No specimens collected     Staff:   Kamranulator: Cathy Haney Person: Gabbi           Indications: Sundar Gentile is an 58 y.o. male who is having surgery for Right carpal tunnel syndrome [G56.01].  He is identified preoperatively with his wife.  Risk and benefits surgical invention once again reviewed at length with him.  Risks include but are not limited to risk of anesthesia, infection, bleeding, injury to adjacent structures, paralysis, paresthesias, dysfunction, deformity, scarring, recurrence, nonresolution of symptoms, possible need for further surgical interventions among others.  We have discussed the preoperative course, the operative seizure, and the postoperative course at length as well.  He  understands all of our discussions.  He understands his current clinical situation and treatment options.  He wished to proceed with surgical intervention for right carpal tunnel release of the median nerve.  The consent is obtained.  The right hand is appropriate identified and marked preoperatively with the marking pen.  He received preoperative IV antibiotics.  SCDs are in place.  The preoperative safety checklist was performed.  He was then taken the operating room placed in supine position on the operating table.    The patient was seen in the preoperative area. The risks, benefits, complications, treatment options, non-operative alternatives, expected recovery and outcomes were discussed with the patient. The possibilities of reaction to medication, pulmonary aspiration, injury to surrounding structures, bleeding, recurrent infection, the need for additional procedures, failure to diagnose a condition, and creating a complication requiring transfusion or operation were discussed with the patient. The patient concurred with the proposed plan, giving informed consent.  The site of surgery was properly noted/marked if necessary per policy. The patient has been actively warmed in preoperative area. Preoperative antibiotics have been ordered and given within 1 hours of incision. Venous thrombosis prophylaxis have been ordered including bilateral sequential compression devices    Procedure Details:     SURGEON:  Mayur Williamson Jr, MD    PREOPERATIVE DIAGNOSIS:    Right carpal tunnel syndrome.    POSTOPERATIVE DIAGNOSIS:    Right carpal tunnel syndrome.    OPERATIVE PROCEDURE:    The patient underwent right carpal tunnel release of the median nerve  at the wrist.     ASSISTANT:    Delia Mace.    ANESTHESIA:    [place] block with sedation.    ESTIMATED BLOOD LOSS:    Less than 10 mL.    CONDITION:    Stable.    COMPLICATIONS:    None.    SPECIMEN:    None.    DETAILS OF PROCEDURE:    The patient was identified  preoperatively with his wife.  Risks and  benefits of surgical intervention were once again reviewed with them.   Risks include anesthesia, infection, bleeding, injury to adjacent  structures, paralysis, paresthesias, dysfunction, deformity,  scarring, recurrence, nonresolution of symptoms, possible need for  further surgical interventions among others.  He understands his current clinical situation  and treatment options.  He wished to proceed with surgical  intervention.  The consent was obtained.  The right hand  was appropriately identified and marked preoperatively with a marking  pen.  He received preoperative IV antibiotics.  SCDs were in place.  The appropriate preoperative safety checklist was performed.  He was  then taken to the operating room, placed in supine position on the  operating room table.  The Anesthesia Department then appropriately  performed [place] block anesthetic to the right upper extremity.  This  involved placement of dorsal right hand IV catheter, placement of  proximal upper extremity tourniquet, Esmarch exsanguination of the  right upper extremity, and inflation of the proximal upper extremity  tourniquet, and instillation of the anesthetic medication.  Once this  was appropriately performed by the Anesthesia Department, the right  upper extremity was cleaned, prepped, and draped in usual sterile  fashion.  The entire procedure was performed under loupe  magnification.  The appropriate mid central, longitudinal, palmar  incision site was marked with a marking pen.  The right hand was  appropriately positioned on the hand table and retracted with the  aluminum hand retracting system.  The appropriate time-out procedure  was performed.  The incision was then made with a scalpel.  Subcutaneous tissue was dissected with tenotomy scissors.  Excellent  hemostasis was maintained with electrocautery.  A small Heiss  retractor and Ragnell retractors were employed to retract the skin  and  subcutaneous tissue.  Further dissection with tenotomy scissors  was performed.  The palmar aponeurosis was identified.  This was then  incised mid centrally and longitudinally with the scalpel.  Further  dissection with tenotomy scissors was performed and the transverse  carpal ligament was identified.  This was then incised mid centrally  and longitudinally with the scalpel.  The median nerve was identified  directly underneath.  The mid central, longitudinal, and transverse  carpal ligament incision was then extended distally in a longitudinal  fashion under direct vision with loupe magnification employing the  scalpel and tenotomy scissors.  Complete release from mid central to  distal was accomplished.  This was confirmed with a hemostat.  The  mid central, longitudinal, transverse carpal ligament incision was  then extended in a longitudinal fashion proximally under direct  vision with loupe magnification employing the scalpel and tenotomy  scissors.  Complete release of the transverse carpal ligament was  accomplished.  This was once again confirmed with a hemostat.  The  median nerve was further identified.  It was briefly, gently, and  meticulously dissected along its lateral margins.  No other  compressive pathologic entities were identified in the carpal tunnel.   The operative site was then copiously irrigated with saline  solution.  The operative site was then injected in a local  circumferential fashion with approximately 6 mL of 2% plain lidocaine  solution for local anesthetic effect.  The proximal upper extremity  tourniquet was then released.  Total tourniquet time was 27 minutes.  The entire hand and all 5 digits became pink, warm, and had a  capillary refill of 1 to 2 seconds upon tourniquet release.  Excellent hemostasis was maintained with electrocautery.  Further  copious irrigation with saline solution was performed.  Excellent  hemostasis was demonstrated.  The skin edges were then  reapproximated  with 1 intermittent horizontal mattress suture for 4-0 nylon and a  few simple interrupted sutures of 4-0 nylon.  The operative site was  further cleaned and dried and then injected in local circumferential  fashion with approximately 6 mL of 0.25% plain Marcaine solution for  prolonged postoperative pain relief.  Right hand was further cleaned  and dried.  Bacitracin, Xeroform, and the appropriate bulky  protective sterile dressing was then fashioned and applied.  Distal  tips of all 5 digits remained pink, warm, and had a capillary refill  of 1 to 2 seconds after dressing application.  He tolerated the  procedure very well.  He awoke from anesthesia without difficulty and  was transferred to the recovery room in stable condition.       Mayur Williamson Jr, MD      Complications:  None; patient tolerated the procedure well.    Disposition: PACU - hemodynamically stable.  Condition: stable     Tourniquet Times:     Total Tourniquet Time Documented:  Arm - Upper (Right) - 27 minutes  Total: Arm - Upper (Right) - 27 minutes      Findings: Right carpal tunnel syndrome    Attending Attestation: I was present and scrubbed for the entire procedure.    Mayur Williamson  Phone Number: [phone]

## 2024-09-20 NOTE — ANESTHESIA POSTPROCEDURE EVALUATION
Patient: Sundar Gentile    Procedure Summary       Date: 09/20/24 Room / Location: [facility] OR 05 / [facility] OR    Anesthesia Start: 1227 Anesthesia Stop: 1340    Procedure: Decompression Median Nerve with Carpal Tunnel Release (Right: Hand) Diagnosis:       Right carpal tunnel syndrome      (Right carpal tunnel syndrome [G56.01])    Surgeons: Mayur Williamson MD Responsible Provider: Neal Davis DO    Anesthesia Type: MAC, regional ASA Status: 2            Anesthesia Type: MAC, regional    Vitals Value Taken Time   /80 09/20/24 1340   Temp 36 09/20/24 1340   Pulse 59 09/20/24 1340   Resp 14 09/20/24 1340   SpO2 96 09/20/24 1340       Anesthesia Post Evaluation    Patient location during evaluation: bedside  Patient participation: complete - patient participated  Level of consciousness: awake  Pain management: adequate  Airway patency: patent  Cardiovascular status: acceptable  Respiratory status: acceptable  Hydration status: acceptable  Postoperative Nausea and Vomiting: none      No notable events documented.

## 2024-09-20 NOTE — ANESTHESIA PREPROCEDURE EVALUATION
Patient: Sundar Gentile    Procedure Information       Date/Time: 09/20/24 1215    Procedure: Decompression Median Nerve with Carpal Tunnel Release (Right) - 60 min    [facility]  / [facility] OR    Surgeons: Mayur Williamson MD            Relevant Problems   Anesthesia (within normal limits)      Cardiac   (+) Hyperlipidemia   (+) Hypertension      Neuro   (+) Right carpal tunnel syndrome   (+) Right cervical radiculopathy   (+) Situational depression      GI   (+) Hiatal hernia      Musculoskeletal   (+) Right carpal tunnel syndrome      HEENT   (+) Sinusitis       Clinical information reviewed:    Allergies  Meds               NPO Detail:  NPO/Void Status  Carbohydrate Drink Given Prior to Surgery? : N  Date of Last Liquid: 09/20/24  Time of Last Liquid: 0400  Date of Last Solid: 09/19/24  Time of Last Solid: 1700  Last Intake Type: Clear fluids  Time of Last Void: 1013         Physical Exam    Airway  Mallampati: II  TM distance: >3 FB  Neck ROM: full     Cardiovascular   Rhythm: regular  Rate: normal     Dental - normal exam     Pulmonary   Breath sounds clear to auscultation     Abdominal   (+) obese  Abdomen: soft             Anesthesia Plan    History of general anesthesia?: yes  History of complications of general anesthesia?: no    ASA 2     regional     Postoperative administration of opioids is intended.  Anesthetic plan and risks discussed with patient.  Use of blood products discussed with patient who consented to blood products.    Plan discussed with CAA.

## 2024-09-20 NOTE — DISCHARGE INSTRUCTIONS
Post-Operative Instructions    These discharge instructions provide you with general information on caring for yourself after you leave the hospital. Your doctor may also give you specific instructions. If you have any problems or questions after discharge, please call Dr. Williamson office [phone].  Home Going Instructions:  Take over-the-counter or prescription medications for pain as directed. Resume your usual medications at home.   Keep your hand raised (elevated) for 2-3 days on 4-5 pillows above the level of your heart as much possible even when sleeping. This keeps swelling down and helps with discomfort.  Gently move your fingers to prevent stiffness, DO NOT USE HAND. Straighten fingers to full extension. No gripping weights, grabbing, pushing, pulling, lifting or carrying.  Use hand for very minimal activity. Ex: Buttons, Zippers.  When showering cover hand with a plastic bag to keep the dressing clean, dry and intact.   DO NOT CHANGE DRESSING, Dr. Williamson will remove dressing in his office during the follow up visit in 3-4 days.  Call your Doctor at his office if:  You develop severe pain not relieved by medications.  You develop bleeding from your surgical site.  You have an oral temperature about 101°F.  You develop redness or swelling of the surgical site.  SEEK IMMEDIATE MEDICAL CARE IF: You have chest pain, difficulty breathy, and/or if you develop a reaction or side effects to medication given.  For Your Safety since you were given sedation/anesthesia and will be taking pain medication:  Someone should stay with you for 24 hours.  Change positions slowly.

## 2024-09-20 NOTE — ANESTHESIA PROCEDURE NOTES
Peripheral Block    Patient location during procedure: OR  Start time: 9/20/2024 12:35 PM  End time: 9/20/2024 12:37 PM  Reason for block: at surgeon's request  Staffing  Performed: ELZA and CAA   Authorized by: Neal Davis DO    Performed by: GERARD Díaz  Preanesthetic Checklist  Completed: patient identified, IV checked, site marked, risks and benefits discussed, surgical consent, monitors and equipment checked, pre-op evaluation and timeout performed   Timeout performed at:   Peripheral Block  Patient position: laying flat  Patient monitoring: heart rate, cardiac monitor and continuous pulse ox  Block type: Kacie block  Laterality: right  Injection technique: single-shot  Local infiltration: lidocaine  Additional Notes  50ml 0.5% Lidocaine infused into R hand PIV after wrapping with Esmarch and tourniquet went up.

## 2024-09-24 ENCOUNTER — HOSPITAL ENCOUNTER (OUTPATIENT)
Dept: RADIOLOGY | Facility: HOSPITAL | Age: 59
Discharge: HOME | End: 2024-09-24
Payer: COMMERCIAL

## 2024-09-24 DIAGNOSIS — Z96.651 PRESENCE OF RIGHT ARTIFICIAL KNEE JOINT: ICD-10-CM

## 2024-09-24 PROCEDURE — 73700 CT LOWER EXTREMITY W/O DYE: CPT | Mod: RT

## 2024-09-24 PROCEDURE — 73700 CT LOWER EXTREMITY W/O DYE: CPT | Mod: RIGHT SIDE | Performed by: RADIOLOGY

## 2024-11-06 ENCOUNTER — EVALUATION (OUTPATIENT)
Dept: OCCUPATIONAL THERAPY | Facility: CLINIC | Age: 59
End: 2024-11-06
Payer: COMMERCIAL

## 2024-11-06 DIAGNOSIS — G89.18 OTHER ACUTE POSTPROCEDURAL PAIN: ICD-10-CM

## 2024-11-06 DIAGNOSIS — M25.531 RIGHT WRIST PAIN: ICD-10-CM

## 2024-11-06 DIAGNOSIS — G56.01 RIGHT CARPAL TUNNEL SYNDROME: Primary | ICD-10-CM

## 2024-11-06 PROCEDURE — 97035 APP MDLTY 1+ULTRASOUND EA 15: CPT | Mod: GO

## 2024-11-06 PROCEDURE — 97165 OT EVAL LOW COMPLEX 30 MIN: CPT | Mod: GO

## 2024-11-06 NOTE — PROGRESS NOTES
Occupational Therapy                  Patient Name: Sundar Gentile  MRN: [id number]  Today's Date: 11/6/2024     Goals:    Problem #1:  Decreased home exercise program knowledge  Goal #1:  The patient to demonstrate with 100% accuracy exercises to increase strength throughout the right hand.  Treatment Intervention:  Home exercise program education along with range-of-motion activities education.    Problem #2: Increased pain level.  Goal #2:  Patient to verbalize a decreased pain level in the right upper extremity by two levels.  Current pain level 6/10.   Treatment intervention: Neuromuscular reeducation, pain decreasing modalities, patient education and coping skill education.    Assessment:   This patient presents with a post surgical  condition and has displayed appropriate coping abilities in dealing with the effects of this injury.  Standardized testing and measures administered today, including Quick Dash, reveal that the patient has minimal impairments in body structures and functions, activity limitations, and participation restrictions.  The Quick Dash was scored at 32.  The patient has a 3 month  history of the present problem as is without any personal factors and/or comorbidities that impact the plan of care.  The client was independent prior to the onset of this is impairment.  Two forms of identification were provided and he verbalized no complaints during the intake assessment of abuse or neglect.    Areas affected by this include limited muscle strength, decreased home task tolerance.  The patient's clinical presentation includes stable & uncomplicated characteristics as noted during today's evaluation, including pain with decreased strength.   These deficits are effecting his home abilities at various times during the day and may be considered as presenting with a condition that is stable & uncomplicated.   Treatment options vary for this client with various treatment protocols available. Time spent  evaluating this client and providing treatment, evaluation and education 35 minutes.  The combination of these findings indicate that this patient has 1 performance deficits and is of low complexity, and skilled OT services were warranted in order to realize measurable change in the above outcome measures and achieve improvements in the patient's functional status and individual goals.  The patient verbalized understanding and is in agreement with all goals and plan of care.    Frequency of care was developed with input and agreement by the patient.    Plan of Care    Frequency and duration: time(s) a week . 1-2 times a week for 12 weeks or for 24 visits.     Plan of care was developed with input and agreement by the patient.     Client's primary Goal: Return to previous level of function and independence, use right wrist and hand again.     Reason For Visit    Initial Evaluation, Evaluation and Treatment.        Client Input    The patient's primary form of communication is English. There are no language barriers. Social interaction is appropriate with good interactive skills noted.    Difficulty With Movement, Self Care, Home Management,  Activity, ADL'S    Past Med/Surgical History: Reviewed  Current Medications  Please see patient medication and intake questionnaire for current medications.    Adult Risk Screening  There are no spiritual/cultural practices/values/needs that are important to know.  No apparent abuse or neglect is noted, no suicidal ideation or self-harm plans referenced.      Initial Fall Risk Screening:     The client has not fallen in the last 6 months.  The client does not have a fear of falling. The client does not need assistance with sitting, standing or walking.  The client does not use an assistance walking.  The client does not need assistance in an unfamiliar setting. The client is not using an assistive device.      Fall Risk: none     Insurance  Insurance reviewed   Visit number:   1     Treatment    Time in clinic started at: 1215  Time in clinic ended at:  1250  09715 - OT Eval Low Complexity 20 min.  [id number] Ultrasound, 15  Total time in clinic is minutes: 35         Subjective    Patient reports: The doctor told him the scar tissue was bad at the incision site and that he needs ultrasound treatments to that area.     Objective  Neuro: Intact. No Numbness or tingling since surgery.     Strength:      Strength:   Level II lbs. on the right:  32     composite strength:  Right: WFL  Left: WFL left wrist fusion.     Right Upper Extremity:   Functional Task Tolerance: Pain with increased pressure or movement of the right wrist.  Hypersensitive scar area.      Not Limited Progressing Painful Limited   Carrying    x   Gripping    x   Lifting    x   Manipulation    x   Pinching    x   Pulling    x   Pushing    x   Reaching    x   Weightbearing    x     Treatment Performed Today:  Information communicated to patient.   Education provided included home program   Home program: PROM , tendon gliding , AAROM , AROM , modalities available and usage, desensitization and strengthening.     Ridge MELISSA/SANTIAGO, CHT, Date: 11/06/2024  1. Right carpal tunnel syndrome        2. Other acute postprocedural pain  Referral to Occupational Therapy      3. Right wrist pain

## 2024-11-12 ENCOUNTER — TREATMENT (OUTPATIENT)
Dept: OCCUPATIONAL THERAPY | Facility: CLINIC | Age: 59
End: 2024-11-12
Payer: COMMERCIAL

## 2024-11-12 DIAGNOSIS — M25.531 RIGHT WRIST PAIN: ICD-10-CM

## 2024-11-12 DIAGNOSIS — G56.01 RIGHT CARPAL TUNNEL SYNDROME: Primary | ICD-10-CM

## 2024-11-12 PROCEDURE — 97035 APP MDLTY 1+ULTRASOUND EA 15: CPT | Mod: GO

## 2024-11-12 NOTE — PROGRESS NOTES
Occupational Therapy                   Patient Name: Sundar Gentile  MRN: [id number]  Today's Date: 11/12/2024   Reason For Visit    Occupational therapy follow visit.      Client Input    Client's primary Goal: Return to previous level of function and independence.     Adult Risk Screening  There are no spiritual/cultural practices/values/needs that are important to know   Initial Fall Risk Screening:   The client has not fallen in the last 6 months. The client has no fall injury. The client does not have a fear of falling. The client does not need assistance with sitting, standing or walking. The client does not need assistance walking in his home. The client does not need assistance in an unfamiliar setting. The patient is not using an assistive device.        Fall Risk: none     Insurance: Reviewed   Visit number: 2    Subjective: I feel a little better after the last visit.     The patient's primary form of communication is English. There are no language barriers. Social interaction is appropriate with good interactive skills noted. Difficulty With Movement, Home Management, Work, ADL'S    Objective:   Modalities: Ultrasound  Joint Mobilizations: [R] wrist and palm.  Therapeutic Exercises AROM, PROM, Stretching.  Patient Education:  Edema control to continue. Composite flexion and opposition exercises to continue. Resistive exercises with egg encouraged for strengthening.   Therapist instructed patient on home exercise program, patient verbalized understanding. Primary form of preferred communication utilized, English.     Assessment: The client tolerated well.  Motion gains noted with stretching and soft tissue mobilizations. Pain decreased.   Activity tolerance increasing for home and daily tasks.  All therapeutic exercises, modalities and activities performed this date to reduce pain, increase hand function, increase strength and independence in ADL and IADL tasks.    Plan:  Continue with occupational therapy  progress with motion and strengthening.  Progress towards ADL and work independence along with functional strengthening.    Time in clinic started at: 1300  Time in clinic ended at:  1320  Timed: 11342 Neuromuscular reeducation, 5  [id number] Ultrasound, 15    Total time in clinic in minutes: 20    Ridge Abarca OTR/L, CHT

## 2025-02-04 DIAGNOSIS — G56.01 RIGHT CARPAL TUNNEL SYNDROME: Primary | ICD-10-CM

## 2025-02-09 NOTE — PROGRESS NOTES
Subjective   Patient ID: Sundar Gentile is a 59 y.o. male who presents for  A YEARLY CHECK PROSTATE EXAM.  FATHER AND GRANDFATHER ON MOTHERS SIDE HAD PROSTATE CANCER   HPI:  Are you experiencing:  Burning on urination -- NO  Pain on urination  -- NO  Urinary frequency --NO  Urinary urgency -- NO  Urge incontinence --NO  Urinary stress incontinence  -- NO  Number of pads used per day -- NONE  Enuresis -- NO  Nocturia--  0-1- X ON AVG  Hematuria -- NO  Hesitancy -- OCC  Post void fullness -- NO    ROS:  General-- No C/O fever or chills  Head-- No C/O Dizziness  Eyes-- NO  C/O blurry or double vision  Ears-- No C/O hearing loss  Neck-- Supple  Chest-- No C/O pain or discomfort  Lungs-- No C/O shortness of breath  Abdomen-- No C/O  pain or discomfort, No nausea or vomiting  Back-- OCC C/O back pain -- H/O L-S DDD   Extremities-- No C/O swelling or pain    OBJECTIVE  General-- well-developed, well-nourished in NAD  Head-- normal cephalic, atraumatic  Eyes-- PERRL, EOM'S FROM, no jaundice  Neck-- Supple, without masses  Chest-- Normal bony structure  Abdomen-- soft, non tender, liver spleen not palpable. No suprapubic masses.  Back-- no flank masses palpable, no CVA tenderness on palpation or percussion  Lymph nodes-- No inguinal lymphadenopathy noted  Prostate-- 1+, firm, smooth, non-tender, without nodules  Testis-- both down, non-tender, without masses  Epididymis-- no masses palpable  Scrotum -- no hydrocele noted  Extremities -- Normal muscle mass and tone for the patients age  Neurological-- oriented times three    PSA:  2/14/2025--1.57  8/29/2022--1.09  4/21/2021--1.1    ASSESSMENT / PLAN  A:  OLD RECORDS FROM [facility] REVIEWED  NORMAL FEELING PROSTATE  FATHER HAD PROSTATE CANCER  GRANDFATHER ON MOTHERS SIDE HAD PROSTATE CANCER    PT HAS LOW LABIDO -- WOULD LIKE TO HAVE HIS TESTOSTERONE LEVEL CHECKED  H/O ED -- PT USES CIALIS 10 MG PRN     REMOTE H/O KIDNEY STONES  METABOLIC STONE W/U  DONE 4-21-21 REVEALED:  1) LOW  URINARY CITRATE LEVELS  2) LOW URINARY MAGNESIUM LEVELS    P:  OLD RECORDS FROM [facility]  PSA NOW AND IN ONE YEAR  CIALIS 10 MG EVERY 36 HRS PRN   F/U IN ONE YEAR   Avtar Osborn MD 02/09/25 12:48 PM   ADDENDUM:  TESTOSTERONE -- 227 -- NORMAL -- 200 - 827  PT HAS ELECTED TO TREAT THIS CONSERVATIVELY FOR NOW

## 2025-02-10 ENCOUNTER — OFFICE VISIT (OUTPATIENT)
Dept: UROLOGY | Facility: CLINIC | Age: 60
End: 2025-02-10
Payer: COMMERCIAL

## 2025-02-10 VITALS — TEMPERATURE: 97.3 F | HEIGHT: 71 IN | RESPIRATION RATE: 16 BRPM | WEIGHT: 216 LBS | BODY MASS INDEX: 30.24 KG/M2

## 2025-02-10 DIAGNOSIS — N40.1 BENIGN PROSTATIC HYPERPLASIA WITH LOWER URINARY TRACT SYMPTOMS, SYMPTOM DETAILS UNSPECIFIED: ICD-10-CM

## 2025-02-10 DIAGNOSIS — R39.9 URINARY SYMPTOM OR SIGN: ICD-10-CM

## 2025-02-10 DIAGNOSIS — R68.82 DECREASED LIBIDO: ICD-10-CM

## 2025-02-10 DIAGNOSIS — R35.1 NOCTURIA: Primary | ICD-10-CM

## 2025-02-10 LAB
POC APPEARANCE, URINE: CLEAR
POC BILIRUBIN, URINE: NEGATIVE
POC BLOOD, URINE: NEGATIVE
POC COLOR, URINE: YELLOW
POC GLUCOSE, URINE: NEGATIVE MG/DL
POC KETONES, URINE: NEGATIVE MG/DL
POC LEUKOCYTES, URINE: NEGATIVE
POC NITRITE,URINE: NEGATIVE
POC PH, URINE: 7 PH
POC PROTEIN, URINE: NEGATIVE MG/DL
POC SPECIFIC GRAVITY, URINE: 1.01
POC UROBILINOGEN, URINE: 0.2 EU/DL

## 2025-02-10 PROCEDURE — 81003 URINALYSIS AUTO W/O SCOPE: CPT | Mod: QW | Performed by: UROLOGY

## 2025-02-10 PROCEDURE — 99214 OFFICE O/P EST MOD 30 MIN: CPT | Performed by: UROLOGY

## 2025-02-10 PROCEDURE — 3008F BODY MASS INDEX DOCD: CPT | Performed by: UROLOGY

## 2025-02-10 PROCEDURE — 1036F TOBACCO NON-USER: CPT | Performed by: UROLOGY

## 2025-02-10 RX ORDER — ONDANSETRON 4 MG/1
TABLET, ORALLY DISINTEGRATING ORAL
COMMUNITY
Start: 2025-01-07

## 2025-02-10 RX ORDER — DEXTROAMPHETAMINE SACCHARATE, AMPHETAMINE ASPARTATE, DEXTROAMPHETAMINE SULFATE AND AMPHETAMINE SULFATE 2.5; 2.5; 2.5; 2.5 MG/1; MG/1; MG/1; MG/1
1 TABLET ORAL 2 TIMES DAILY PRN
COMMUNITY

## 2025-02-10 RX ORDER — NAPROXEN 500 MG/1
TABLET ORAL
COMMUNITY
Start: 2025-02-09

## 2025-02-10 RX ORDER — TADALAFIL 10 MG/1
TABLET ORAL EVERY 24 HOURS
COMMUNITY
Start: 2024-07-18

## 2025-02-10 SDOH — ECONOMIC STABILITY: FOOD INSECURITY: WITHIN THE PAST 12 MONTHS, YOU WORRIED THAT YOUR FOOD WOULD RUN OUT BEFORE YOU GOT MONEY TO BUY MORE.: NEVER TRUE

## 2025-02-10 SDOH — ECONOMIC STABILITY: FOOD INSECURITY: WITHIN THE PAST 12 MONTHS, THE FOOD YOU BOUGHT JUST DIDN'T LAST AND YOU DIDN'T HAVE MONEY TO GET MORE.: NEVER TRUE

## 2025-02-10 ASSESSMENT — ENCOUNTER SYMPTOMS
DEPRESSION: 0
OCCASIONAL FEELINGS OF UNSTEADINESS: 0
LOSS OF SENSATION IN FEET: 0

## 2025-02-10 ASSESSMENT — PAIN SCALES - GENERAL: PAINLEVEL_OUTOF10: 0-NO PAIN
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
## 2025-02-14 ENCOUNTER — PRE-ADMISSION TESTING (OUTPATIENT)
Dept: PREADMISSION TESTING | Facility: HOSPITAL | Age: 60
End: 2025-02-14
Payer: COMMERCIAL

## 2025-02-14 ENCOUNTER — APPOINTMENT (OUTPATIENT)
Dept: LAB | Facility: HOSPITAL | Age: 60
End: 2025-02-14
Payer: COMMERCIAL

## 2025-02-14 VITALS
OXYGEN SATURATION: 100 % | BODY MASS INDEX: 33.52 KG/M2 | TEMPERATURE: 96.8 F | SYSTOLIC BLOOD PRESSURE: 141 MMHG | RESPIRATION RATE: 16 BRPM | HEIGHT: 71 IN | WEIGHT: 239.42 LBS | HEART RATE: 61 BPM | DIASTOLIC BLOOD PRESSURE: 89 MMHG

## 2025-02-14 DIAGNOSIS — Z01.818 PRE-OP TESTING: Primary | ICD-10-CM

## 2025-02-14 LAB
ANION GAP SERPL CALC-SCNC: 14 MMOL/L (ref 10–20)
BUN SERPL-MCNC: 20 MG/DL (ref 6–23)
CALCIUM SERPL-MCNC: 9.4 MG/DL (ref 8.6–10.3)
CHLORIDE SERPL-SCNC: 105 MMOL/L (ref 98–107)
CO2 SERPL-SCNC: 28 MMOL/L (ref 21–32)
CREAT SERPL-MCNC: 1.12 MG/DL (ref 0.5–1.3)
EGFRCR SERPLBLD CKD-EPI 2021: 75 ML/MIN/1.73M*2
GLUCOSE SERPL-MCNC: 97 MG/DL (ref 74–99)
POTASSIUM SERPL-SCNC: 4.9 MMOL/L (ref 3.5–5.3)
SODIUM SERPL-SCNC: 142 MMOL/L (ref 136–145)

## 2025-02-14 PROCEDURE — 93005 ELECTROCARDIOGRAM TRACING: CPT

## 2025-02-14 PROCEDURE — 99202 OFFICE O/P NEW SF 15 MIN: CPT | Performed by: NURSE PRACTITIONER

## 2025-02-14 PROCEDURE — 80048 BASIC METABOLIC PNL TOTAL CA: CPT

## 2025-02-14 ASSESSMENT — DUKE ACTIVITY SCORE INDEX (DASI)
CAN YOU WALK A BLOCK OR TWO ON LEVEL GROUND: YES
CAN YOU HAVE SEXUAL RELATIONS: YES
CAN YOU RUN A SHORT DISTANCE: YES
CAN YOU DO YARD WORK LIKE RAKING LEAVES, WEEDING OR PUSHING A MOWER: YES
DASI METS SCORE: 6.8
CAN YOU PARTICIPATE IN MODERATE RECREATIONAL ACTIVITIES LIKE GOLF, BOWLING, DANCING, DOUBLES TENNIS OR THROWING A BASEBALL OR FOOTBALL: NO
CAN YOU WALK INDOORS, SUCH AS AROUND YOUR HOUSE: YES
CAN YOU PARTICIPATE IN STRENOUS SPORTS LIKE SWIMMING, SINGLES TENNIS, FOOTBALL, BASKETBALL, OR SKIING: NO
CAN YOU TAKE CARE OF YOURSELF (EAT, DRESS, BATHE, OR USE TOILET): YES
TOTAL_SCORE: 33.2
CAN YOU CLIMB A FLIGHT OF STAIRS OR WALK UP A HILL: YES
CAN YOU DO LIGHT WORK AROUND THE HOUSE LIKE DUSTING OR WASHING DISHES: YES
CAN YOU DO HEAVY WORK AROUND THE HOUSE LIKE SCRUBBING FLOORS OR LIFTING AND MOVING HEAVY FURNITURE: NO
CAN YOU DO MODERATE WORK AROUND THE HOUSE LIKE VACUUMING, SWEEPING FLOORS OR CARRYING GROCERIES: NO

## 2025-02-14 ASSESSMENT — ACTIVITIES OF DAILY LIVING (ADL): ADL_SCORE: 0

## 2025-02-14 ASSESSMENT — ENCOUNTER SYMPTOMS
NECK NEGATIVE: 1
ENDOCRINE NEGATIVE: 1
GASTROINTESTINAL NEGATIVE: 1
NEUROLOGICAL NEGATIVE: 1
EYES NEGATIVE: 1
CONSTITUTIONAL NEGATIVE: 1
RESPIRATORY NEGATIVE: 1

## 2025-02-14 ASSESSMENT — LIFESTYLE VARIABLES: SMOKING_STATUS: NONSMOKER

## 2025-02-14 ASSESSMENT — PAIN - FUNCTIONAL ASSESSMENT: PAIN_FUNCTIONAL_ASSESSMENT: 0-10

## 2025-02-14 NOTE — PREPROCEDURE INSTRUCTIONS
Medication List            Accurate as of February 14, 2025  8:35 AM. Always use your most recent med list.                amphetamine-dextroamphetamine 10 mg tablet  Commonly known as: Adderall  Medication Adjustments for Surgery: Take last dose 1 day (24 hours) before surgery     atorvastatin 20 mg tablet  Commonly known as: Lipitor  Medication Adjustments for Surgery: Take/Use as prescribed     calcium-magnesium 300-300 mg tablet  Additional Medication Adjustments for Surgery: Take last dose 7 days before surgery     cyclobenzaprine 10 mg tablet  Commonly known as: Flexeril  Medication Adjustments for Surgery: Take/Use as prescribed     losartan 100 mg tablet  Commonly known as: Cozaar  Medication Adjustments for Surgery: Take last dose 1 day (24 hours) before surgery     naproxen 500 mg tablet  Commonly known as: Naprosyn  Additional Medication Adjustments for Surgery: Take last dose 7 days before surgery     NON FORMULARY  Additional Medication Adjustments for Surgery: Take last dose 7 days before surgery     NON FORMULARY  Additional Medication Adjustments for Surgery: Take last dose 7 days before surgery     ondansetron ODT 4 mg disintegrating tablet  Commonly known as: Zofran-ODT  Medication Adjustments for Surgery: Take/Use as prescribed     oxyCODONE-acetaminophen 7.5-325 mg tablet  Commonly known as: Percocet  Medication Adjustments for Surgery: Take/Use as prescribed     PHENobarbital 60 mg tablet  Commonly known as: Luminal  Notes to patient: Not taking     tadalafil 10 mg tablet  Commonly known as: Cialis  Medication Adjustments for Surgery: Take last dose 3 days before surgery     Vitamin C 500 mg chewable tablet  Generic drug: ascorbic acid  Additional Medication Adjustments for Surgery: Take last dose 7 days before surgery                                  PRE-OPERATIVE INSTRUCTIONS    You will receive notification one business day prior to your procedure to confirm your arrival time. It is  important that you answer your phone and/or check your messages during this time. If you do not hear from the surgery center by 5 pm. the day before your procedure, please call [phone].     Please enter the building through the Outpatient entrance and take the elevator off the lobby to the 2nd floor then check in at the Outpatient Surgery desk on the 2nd floor.    INSTRUCTIONS:  Talk to your surgeon for instructions if you should stop your aspirin, blood thinner, or diabetes medicines.  DO NOT take any multivitamins or over the counter supplements for 7-10 days before surgery.  If not being admitted, you must have an adult immediately available to drive you home after surgery. We also highly recommend you have someone stay with you for the entire day and night of your surgery.  For children having surgery, a parent or legal guardian must accompany them to the surgery center. If this is not possible, please call [phone] to make additional arrangements.  For adults who are unable to consent or make medical decisions for themselves, a legal guardian or Power of  must accompany them to the surgery center. If this is not possible, please call [phone] to make additional arrangements.  Wear comfortable, loose fitting clothing.  All jewelry and piercings must be removed. If you are unable to remove an item or have a dermal piercing, please be sure to tell the nurse when you arrive for surgery.  Nail polish and make-up must be removed.  Avoid smoking or consuming alcohol for 24 hours before surgery.  To help prevent infection, please take a shower/bath and wash your hair the night before and/or morning of surgery (or follow other specific bathing instructions provided).    Preoperative Fasting Guidelines    Why must I stop eating and drinking near surgery time?  With sedation, food or liquid in your stomach can enter your lungs causing serious complications  Increases nausea and vomiting    When do I  need to stop eating and drinking before my surgery?  Do not eat any solid food after midnight the night before your surgery/procedure unless otherwise instructed by your surgeon.   You may have up to 13.5 ounces of clear liquid until TWO hours before your instructed arrival time to the hospital.  This includes water, black tea/coffee, (no milk or cream) apple juice, and electrolyte drinks (Gatorade).   You may chew gum until TWO hours before your surgery/procedure      If applicable, notify your surgeons office immediately of any new skin changes that occur to the surgical limb.      If you have any questions or concerns, please call Pre-Admission Testing at [phone].       Home Preoperative Antibacterial Shower with Chlorhexidine gluconate (CHG)     What is a home preoperative antibacterial shower?  This shower is a way of cleaning the skin with a germ killing solution before surgery. The solution contains chlorhexidine gluconate, commonly known as CHG. CHG is a skin cleanser with germ killing ability. Let your doctor know if you are allergic to chlorhexidine.    Why do I need to take a preoperative antibacterial shower?  Skin is not sterile. It is best to try to make your skin as free of germs as possible before surgery. Proper cleansing with a germ killing soap before surgery can lower the number of germs on your skin. This helps to reduce the risk of infection at the surgical site. Following the instructions listed below will help you prepare your skin for surgery.    How do I use the solution?  Begin using your CHG soap the night before and again the morning of your procedure.   Do not shave the day before or day of surgery.  Remove all jewelry until after surgery. Take off rings and take out all body-piercing jewelry.  Wash your face and hair with normal soap and shampoo before you use the CHG soap.  Apply the CHG solution to a clean wet washcloth. Move away from the water to avoid premature rinsing of  the CHG soap as you are applying. Firmly lather your entire body from the neck down. Do not use CHG on your face, eyes, ears, or genitals.   Pay special attention to the area where your incisions will be located.  Do not scrub your skin too hard.  It is important to allow the CHG soap to sit on your skin for 3-5 minutes.  Rinse the solution off your body completely. Do not wash with your normal soap after the CHG soap solution.  Pat yourself dry with a clean, soft towel.  Do not apply powders, lotions or deodorants as these might block how the CHG soap works.   Dress in clean clothing.  Be sure to sleep with clean, freshly laundered sheets.  Be aware that CHG can cause stains on fabric. Rinse your washcloth and other linens that have contact with CHG completely. Use only non-chlorine detergents to launder the items used.

## 2025-02-14 NOTE — PREPROCEDURE INSTRUCTIONS
Thank you for visiting Preadmission Testing at [facility]. If you have any changes to your health condition, please call the SURGEON's office to alert them and give them details of your symptoms.        Preoperative Brain Exercises    What are brain exercises?  A brain exercise is any activity that engages your thinking (cognitive) skills.    What types of activities are considered brain exercises?  Jigsaw puzzles, crossword puzzles, word jumble, memory games, word search, and many more.  Many can be found free online or on your phone via a mobile shabbir.    Why should I do brain exercises before my surgery?  More recent research has shown brain exercise before surgery can lower the risk of postoperative delirium (confusion) which can be especially important for older adults.  Patients who did brain exercises for 5 to 10 hours the days before surgery, cut their risk of postoperative delirium in half up to 1 week after surgery.      Preoperative Deep Breathing Exercises    Why it is important to do deep breathing exercises before my surgery?  Deep breathing exercises strengthen your breathing muscles.  This helps you to recover after your surgery and decreases the chance of breathing complications.    How are the deep breathing exercises done?  Sit straight with your back supported.  Breathe in deeply and slowly through your nose. Your lower rib cage should expand and your abdomen may move forward.  Hold that breath for 3 to 5 seconds.  Breathe out through pursed lips, slowly and completely.  Rest and repeat 10 times every hour while awake.  Rest longer if you become dizzy or lightheaded.      Patient and Family Education   Ways You Can Help Prevent Blood Clots     This handout explains some simple things you can do to help prevent blood clots.      Blood clots are blockages that can form in the body's veins. When a blood clot forms in your deep veins, it may be called a deep vein thrombosis, or DVT for short. Blood clots  can happen in any part of the body where blood flows, but they are most common in the arms and legs. If a piece of a blood clot breaks free and travels to the lungs, it is called a pulmonary embolus (PE). A PE can be a very serious problem.      Being in the hospital or having surgery can raise your chances of getting a blood clot because you may not be well enough to move around as much as you normally do.      Ways you can help prevent blood clots in the hospital         Wearing SCDs. SCDs stands for Sequential Compression Devices.   SCDs are special sleeves that wrap around your legs  They attach to a pump that fills them with air to gently squeeze your legs every few minutes.   This helps return the blood in your legs to your heart.   SCDs should only be taken off when walking or bathing.   SCDs may not be comfortable, but they can help save your life.               Wearing compression stockings - if your doctor orders them. These special snug fitting stockings gently squeeze your legs to help blood flow.       Walking. Walking helps move the blood in your legs.   If your doctor says it is ok, try walking the halls at least   5 times a day. Ask us to help you get up, so you don't fall.      Taking any blood thinning medicines your doctor orders.          ©[facility]; 3/23        Ways you can help prevent blood clots at home       Wearing compression stockings - if your doctor orders them. ? Walking - to help move the blood in your legs.       Taking any blood thinning medicines your doctor orders.      Signs of a blood clot or PE      Tell your doctor or nurse know right away if you have of the problems listed below.    If you are at home, seek medical care right away. Call 911 for chest pain or problems breathing.          Signs of a blood clot (DVT) - such as pain,  swelling, redness or warmth in your arm or leg      Signs of a pulmonary embolism (PE) - such as chest     pain or feeling short of  breath

## 2025-02-15 LAB
ANION GAP SERPL CALCULATED.4IONS-SCNC: NORMAL MMOL/L
ATRIAL RATE: 62 BPM
BUN SERPL-MCNC: NORMAL MG/DL
BUN/CREAT SERPL: NORMAL
CALCIUM SERPL-MCNC: NORMAL MG/DL
CHLORIDE SERPL-SCNC: NORMAL MMOL/L
CO2 SERPL-SCNC: NORMAL MMOL/L
CREAT SERPL-MCNC: NORMAL MG/DL
EGFRCR SERPLBLD CKD-EPI 2021: NORMAL ML/MIN/{1.73_M2}
GLUCOSE SERPL-MCNC: NORMAL MG/DL
P AXIS: 50 DEGREES
P OFFSET: 191 MS
P ONSET: 127 MS
POTASSIUM SERPL-SCNC: NORMAL MMOL/L
PR INTERVAL: 184 MS
PSA SERPL-MCNC: 1.57 NG/ML
Q ONSET: 219 MS
QRS COUNT: 11 BEATS
QRS DURATION: 136 MS
QT INTERVAL: 412 MS
QTC CALCULATION(BAZETT): 418 MS
QTC FREDERICIA: 416 MS
R AXIS: -47 DEGREES
SODIUM SERPL-SCNC: NORMAL MMOL/L
T AXIS: 0 DEGREES
T OFFSET: 425 MS
TESTOST SERPL-MCNC: 227 NG/DL (ref 250–827)
VENTRICULAR RATE: 62 BPM

## 2025-02-20 LAB
GLUCOSE SERPL-MCNC: NORMAL MG/DL
TESTOST SERPL-MCNC: 227 NG/DL (ref 250–827)

## 2025-02-21 ENCOUNTER — ANESTHESIA (OUTPATIENT)
Dept: OPERATING ROOM | Facility: HOSPITAL | Age: 60
End: 2025-02-21
Payer: COMMERCIAL

## 2025-02-21 ENCOUNTER — ANESTHESIA EVENT (OUTPATIENT)
Dept: OPERATING ROOM | Facility: HOSPITAL | Age: 60
End: 2025-02-21
Payer: COMMERCIAL

## 2025-02-21 ENCOUNTER — HOSPITAL ENCOUNTER (OUTPATIENT)
Facility: HOSPITAL | Age: 60
Setting detail: OUTPATIENT SURGERY
Discharge: HOME | End: 2025-02-21
Payer: COMMERCIAL

## 2025-02-21 VITALS
DIASTOLIC BLOOD PRESSURE: 83 MMHG | SYSTOLIC BLOOD PRESSURE: 135 MMHG | TEMPERATURE: 98.1 F | HEART RATE: 67 BPM | OXYGEN SATURATION: 93 % | RESPIRATION RATE: 16 BRPM

## 2025-02-21 DIAGNOSIS — G56.01 RIGHT CARPAL TUNNEL SYNDROME: ICD-10-CM

## 2025-02-21 PROCEDURE — 3600000008 HC OR TIME - EACH INCREMENTAL 1 MINUTE - PROCEDURE LEVEL THREE

## 2025-02-21 PROCEDURE — 7100000009 HC PHASE TWO TIME - INITIAL BASE CHARGE

## 2025-02-21 PROCEDURE — 7100000010 HC PHASE TWO TIME - EACH INCREMENTAL 1 MINUTE

## 2025-02-21 PROCEDURE — 88304 TISSUE EXAM BY PATHOLOGIST: CPT | Mod: TC,STJLAB

## 2025-02-21 PROCEDURE — 2500000001 HC RX 250 WO HCPCS SELF ADMINISTERED DRUGS (ALT 637 FOR MEDICARE OP): Performed by: ANESTHESIOLOGY

## 2025-02-21 PROCEDURE — 2500000005 HC RX 250 GENERAL PHARMACY W/O HCPCS: Performed by: ANESTHESIOLOGY

## 2025-02-21 PROCEDURE — 3600000003 HC OR TIME - INITIAL BASE CHARGE - PROCEDURE LEVEL THREE

## 2025-02-21 PROCEDURE — 2500000004 HC RX 250 GENERAL PHARMACY W/ HCPCS (ALT 636 FOR OP/ED)

## 2025-02-21 PROCEDURE — 2500000004 HC RX 250 GENERAL PHARMACY W/ HCPCS (ALT 636 FOR OP/ED): Performed by: ANESTHESIOLOGIST ASSISTANT

## 2025-02-21 PROCEDURE — 2720000007 HC OR 272 NO HCPCS

## 2025-02-21 PROCEDURE — 3700000001 HC GENERAL ANESTHESIA TIME - INITIAL BASE CHARGE

## 2025-02-21 PROCEDURE — 2500000005 HC RX 250 GENERAL PHARMACY W/O HCPCS

## 2025-02-21 PROCEDURE — 7100000001 HC RECOVERY ROOM TIME - INITIAL BASE CHARGE

## 2025-02-21 PROCEDURE — 7100000002 HC RECOVERY ROOM TIME - EACH INCREMENTAL 1 MINUTE

## 2025-02-21 PROCEDURE — 3700000002 HC GENERAL ANESTHESIA TIME - EACH INCREMENTAL 1 MINUTE

## 2025-02-21 RX ORDER — CEFAZOLIN SODIUM 2 G/100ML
2 INJECTION, SOLUTION INTRAVENOUS ONCE
Status: CANCELLED | OUTPATIENT
Start: 2025-02-22 | End: 2025-02-22

## 2025-02-21 RX ORDER — CEFAZOLIN SODIUM 2 G/100ML
INJECTION, SOLUTION INTRAVENOUS AS NEEDED
Status: DISCONTINUED | OUTPATIENT
Start: 2025-02-21 | End: 2025-02-21

## 2025-02-21 RX ORDER — LIDOCAINE HYDROCHLORIDE 20 MG/ML
INJECTION, SOLUTION INFILTRATION; PERINEURAL AS NEEDED
Status: DISCONTINUED | OUTPATIENT
Start: 2025-02-21 | End: 2025-02-21

## 2025-02-21 RX ORDER — SODIUM CHLORIDE, SODIUM LACTATE, POTASSIUM CHLORIDE, CALCIUM CHLORIDE 600; 310; 30; 20 MG/100ML; MG/100ML; MG/100ML; MG/100ML
125 INJECTION, SOLUTION INTRAVENOUS CONTINUOUS
Status: DISCONTINUED | OUTPATIENT
Start: 2025-02-21 | End: 2025-02-21 | Stop reason: HOSPADM

## 2025-02-21 RX ORDER — OXYCODONE AND ACETAMINOPHEN 5; 325 MG/1; MG/1
1 TABLET ORAL EVERY 4 HOURS PRN
Status: DISCONTINUED | OUTPATIENT
Start: 2025-02-21 | End: 2025-02-21 | Stop reason: HOSPADM

## 2025-02-21 RX ORDER — ALBUTEROL SULFATE 0.83 MG/ML
2.5 SOLUTION RESPIRATORY (INHALATION) ONCE AS NEEDED
Status: DISCONTINUED | OUTPATIENT
Start: 2025-02-21 | End: 2025-02-21 | Stop reason: HOSPADM

## 2025-02-21 RX ORDER — HYDROMORPHONE HYDROCHLORIDE 1 MG/ML
INJECTION, SOLUTION INTRAMUSCULAR; INTRAVENOUS; SUBCUTANEOUS AS NEEDED
Status: DISCONTINUED | OUTPATIENT
Start: 2025-02-21 | End: 2025-02-21

## 2025-02-21 RX ORDER — ACETAMINOPHEN 325 MG/1
650 TABLET ORAL EVERY 4 HOURS PRN
Status: DISCONTINUED | OUTPATIENT
Start: 2025-02-21 | End: 2025-02-21 | Stop reason: HOSPADM

## 2025-02-21 RX ORDER — HYDRALAZINE HYDROCHLORIDE 20 MG/ML
10 INJECTION INTRAMUSCULAR; INTRAVENOUS EVERY 30 MIN PRN
Status: DISCONTINUED | OUTPATIENT
Start: 2025-02-21 | End: 2025-02-21 | Stop reason: HOSPADM

## 2025-02-21 RX ORDER — OXYCODONE HYDROCHLORIDE 10 MG/1
10 TABLET ORAL EVERY 4 HOURS PRN
Status: DISCONTINUED | OUTPATIENT
Start: 2025-02-21 | End: 2025-02-21 | Stop reason: HOSPADM

## 2025-02-21 RX ORDER — HYDROMORPHONE HYDROCHLORIDE 0.2 MG/ML
0.1 INJECTION INTRAMUSCULAR; INTRAVENOUS; SUBCUTANEOUS EVERY 5 MIN PRN
Status: DISCONTINUED | OUTPATIENT
Start: 2025-02-21 | End: 2025-02-21 | Stop reason: HOSPADM

## 2025-02-21 RX ORDER — DEXAMETHASONE SODIUM PHOSPHATE 10 MG/ML
INJECTION INTRAMUSCULAR; INTRAVENOUS AS NEEDED
Status: DISCONTINUED | OUTPATIENT
Start: 2025-02-21 | End: 2025-02-21

## 2025-02-21 RX ORDER — BACITRACIN ZINC 500 UNIT/G
OINTMENT IN PACKET (EA) TOPICAL AS NEEDED
Status: DISCONTINUED | OUTPATIENT
Start: 2025-02-21 | End: 2025-02-21 | Stop reason: HOSPADM

## 2025-02-21 RX ORDER — FENTANYL CITRATE 50 UG/ML
INJECTION, SOLUTION INTRAMUSCULAR; INTRAVENOUS AS NEEDED
Status: DISCONTINUED | OUTPATIENT
Start: 2025-02-21 | End: 2025-02-21

## 2025-02-21 RX ORDER — SODIUM CHLORIDE, SODIUM LACTATE, POTASSIUM CHLORIDE, CALCIUM CHLORIDE 600; 310; 30; 20 MG/100ML; MG/100ML; MG/100ML; MG/100ML
INJECTION, SOLUTION INTRAVENOUS CONTINUOUS PRN
Status: DISCONTINUED | OUTPATIENT
Start: 2025-02-21 | End: 2025-02-21

## 2025-02-21 RX ORDER — LIDOCAINE HYDROCHLORIDE 20 MG/ML
INJECTION, SOLUTION INFILTRATION; PERINEURAL AS NEEDED
Status: DISCONTINUED | OUTPATIENT
Start: 2025-02-21 | End: 2025-02-21 | Stop reason: HOSPADM

## 2025-02-21 RX ORDER — HYDROMORPHONE HYDROCHLORIDE 1 MG/ML
1 INJECTION, SOLUTION INTRAMUSCULAR; INTRAVENOUS; SUBCUTANEOUS EVERY 5 MIN PRN
Status: DISCONTINUED | OUTPATIENT
Start: 2025-02-21 | End: 2025-02-21 | Stop reason: HOSPADM

## 2025-02-21 RX ORDER — PROPOFOL 10 MG/ML
INJECTION, EMULSION INTRAVENOUS AS NEEDED
Status: DISCONTINUED | OUTPATIENT
Start: 2025-02-21 | End: 2025-02-21

## 2025-02-21 RX ORDER — MIDAZOLAM HYDROCHLORIDE 1 MG/ML
INJECTION, SOLUTION INTRAMUSCULAR; INTRAVENOUS AS NEEDED
Status: DISCONTINUED | OUTPATIENT
Start: 2025-02-21 | End: 2025-02-21

## 2025-02-21 RX ORDER — ONDANSETRON HYDROCHLORIDE 2 MG/ML
INJECTION, SOLUTION INTRAVENOUS AS NEEDED
Status: DISCONTINUED | OUTPATIENT
Start: 2025-02-21 | End: 2025-02-21

## 2025-02-21 RX ORDER — ACETAMINOPHEN 325 MG/1
975 TABLET ORAL ONCE
Status: DISCONTINUED | OUTPATIENT
Start: 2025-02-21 | End: 2025-02-21 | Stop reason: HOSPADM

## 2025-02-21 RX ORDER — BUPIVACAINE HYDROCHLORIDE 2.5 MG/ML
INJECTION, SOLUTION EPIDURAL; INFILTRATION; INTRACAUDAL AS NEEDED
Status: DISCONTINUED | OUTPATIENT
Start: 2025-02-21 | End: 2025-02-21 | Stop reason: HOSPADM

## 2025-02-21 RX ORDER — ONDANSETRON HYDROCHLORIDE 2 MG/ML
4 INJECTION, SOLUTION INTRAVENOUS ONCE AS NEEDED
Status: DISCONTINUED | OUTPATIENT
Start: 2025-02-21 | End: 2025-02-21 | Stop reason: HOSPADM

## 2025-02-21 RX ADMIN — PROPOFOL 200 MG: 10 INJECTION, EMULSION INTRAVENOUS at 11:16

## 2025-02-21 RX ADMIN — HYDROMORPHONE HYDROCHLORIDE 1 MG: 1 INJECTION, SOLUTION INTRAMUSCULAR; INTRAVENOUS; SUBCUTANEOUS at 11:51

## 2025-02-21 RX ADMIN — ONDANSETRON 4 MG: 2 INJECTION, SOLUTION INTRAMUSCULAR; INTRAVENOUS at 13:59

## 2025-02-21 RX ADMIN — Medication 2 L/MIN: at 14:30

## 2025-02-21 RX ADMIN — DEXAMETHASONE SODIUM PHOSPHATE 10 MG: 10 INJECTION INTRAMUSCULAR; INTRAVENOUS at 11:24

## 2025-02-21 RX ADMIN — SODIUM CHLORIDE, POTASSIUM CHLORIDE, SODIUM LACTATE AND CALCIUM CHLORIDE: 600; 310; 30; 20 INJECTION, SOLUTION INTRAVENOUS at 11:09

## 2025-02-21 RX ADMIN — OXYCODONE HYDROCHLORIDE AND ACETAMINOPHEN 1 TABLET: 5; 325 TABLET ORAL at 15:21

## 2025-02-21 RX ADMIN — MIDAZOLAM 2 MG: 1 INJECTION INTRAMUSCULAR; INTRAVENOUS at 11:13

## 2025-02-21 RX ADMIN — CEFAZOLIN SODIUM 2 G: 2 INJECTION, SOLUTION INTRAVENOUS at 11:24

## 2025-02-21 RX ADMIN — FENTANYL CITRATE 100 MCG: 50 INJECTION, SOLUTION INTRAMUSCULAR; INTRAVENOUS at 11:13

## 2025-02-21 RX ADMIN — LIDOCAINE HYDROCHLORIDE 60 MG: 20 INJECTION, SOLUTION INFILTRATION; PERINEURAL at 11:16

## 2025-02-21 SDOH — HEALTH STABILITY: MENTAL HEALTH: CURRENT SMOKER: 0

## 2025-02-21 ASSESSMENT — PAIN SCALES - GENERAL
PAINLEVEL_OUTOF10: 4
PAINLEVEL_OUTOF10: 0 - NO PAIN
PAINLEVEL_OUTOF10: 4
PAINLEVEL_OUTOF10: 7
PAINLEVEL_OUTOF10: 5 - MODERATE PAIN
PAINLEVEL_OUTOF10: 0 - NO PAIN
PAINLEVEL_OUTOF10: 0 - NO PAIN

## 2025-02-21 ASSESSMENT — PAIN DESCRIPTION - DESCRIPTORS: DESCRIPTORS: ACHING

## 2025-02-21 ASSESSMENT — PAIN - FUNCTIONAL ASSESSMENT
PAIN_FUNCTIONAL_ASSESSMENT: 0-10

## 2025-02-21 NOTE — OP NOTE
DECOMPRESSION, NERVE, MEDIAN, OPEN, WITH CARPAL TUNNEL RELEASE (R), CREATION, FLAP, PEDICLE, UPPER EXTREMITY (R) Operative Note     Date: 2025  OR Location: [facility] OR    Name: Sundar Gentile, : 1965, Age: 59 y.o., MRN: [id number], Sex: male    Diagnosis  Pre-op Diagnosis      * Right carpal tunnel syndrome [G56.01] Post-op Diagnosis     * Right carpal tunnel syndrome [G56.01]     Procedures  DECOMPRESSION, NERVE, MEDIAN, OPEN, WITH CARPAL TUNNEL RELEASE  02688 - NV NEUROPLASTY &/TRANSPOS MEDIAN NRV CARPAL TUNNE    CREATION, FLAP, PEDICLE, UPPER EXTREMITY  19172 - NV ADJT TIS TRNS/REARGMT F/C/C/M/N/A/G/H/F 10SQCM/<      Surgeons   Dr. Mayur Williamson MD    Resident/Fellow/Other Assistant:  Shreyas Cosby    Staff:   Surgical Assistant:   Scrub Person: Mark  Circulator: Sundar  Circulator: Diego  Scrub Person: Marifer  Scrub Person: Maegan  Circulator: Zhang    Anesthesia Staff: Anesthesiologist: Sb Raabgo MD  C-AA: GERARD Fernando    Procedure Summary  Anesthesia: Regional, General  ASA: III  Estimated Blood Loss: Less than 10 mL  Intra-op Medications:   Administrations occurring from 1015 to 1300 on 25:   Medication Name Total Dose   ceFAZolin (Ancef) IV 2 g in 100 mL dextrose (iso) - premix 2 g   dexAMETHasone (Decadron) PF injection 10 mg/mL 10 mg   fentaNYL (Sublimaze) injection 50 mcg/mL 100 mcg   HYDROmorphone (Dilaudid) injection 1 mg/mL 1 mg   LR infusion Cannot be calculated   lidocaine (Xylocaine) injection 2 % 60 mg   midazolam (Versed) injection 1 mg/mL 2 mg   propofol (Diprivan) injection 10 mg/mL 200 mg              Anesthesia Record               Intraprocedure I/O Totals          Intake    LR infusion 600.00 mL    ceFAZolin 2 gram/100 mL 100.00 mL    Total Intake 700 mL          Specimen:   ID Type Source Tests Collected by Time   1 : SCAR TISSUE-  RIGHT HAND Tissue SOFT TISSUE RESECTION SURGICAL PATHOLOGY EXAM Mayur Williamson MD 2025 1200   2 : FLEXOR SYNOVIUM,  FLEXOR TENDON OF RIGHT WRIST Tissue SYNOVIUM SURGICAL PATHOLOGY EXAM Mayur Williamson MD 2/21/2025 1237                   Indications: Sundar Gentile is an 59 y.o. male who is having surgery for Right carpal tunnel syndrome [G56.01].  He is identified preoperatively with his wife.  Risk and benefits surgical invention once again reviewed with them at length.  Risks include but are not limited to risks of anesthesia, infection, bleeding, injury to adjacent structures, paralysis, paresthesias, dysfunction, deformity, scarring, recurrence, nonresolution of symptoms, possible need for further surgical interventions among others.  We have discussed the preoperative course, the operative procedure, the postoperative course at length as well.  He is demonstrating significant pain and dysfunction with recalcitrant right carpal tunnel syndrome and significant scar tissue.  We have discussed repeat carpal tunnel release, median nerve neurolysis from scar tissue and hypothenar fat pad flap closure.  We have discussed his current clinical situation and treatment options.  We have also discussed complex regional pain syndrome which he has a history of with his left arm.  We have discussed with anesthesia a preprocedure right axillary block for pain control as well.  He understands all of our discussions.  He wished to proceed with surgical intervention for repeat right carpal tunnel release, median nerve neurolysis, and hypothenar fat pad flap closure.  The consent is obtained.  The right hand is appropriate identified and marked preoperatively with a marking pen.  He received preoperative IV antibiotics.  SCDs are in place.  The preoperative safety checklist was performed.  He was then taken to the operating room and placed in supine position on the operating room table.    The patient was seen in the preoperative area. The risks, benefits, complications, treatment options, non-operative alternatives, expected recovery and  outcomes were discussed with the patient. The possibilities of reaction to medication, pulmonary aspiration, injury to surrounding structures, bleeding, recurrent infection, the need for additional procedures, failure to diagnose a condition, and creating a complication requiring transfusion or operation were discussed with the patient. The patient concurred with the proposed plan, giving informed consent.  The site of surgery was properly noted/marked if necessary per policy. The patient has been actively warmed in preoperative area. Preoperative antibiotics have been ordered and given within 1 hours of incision. Venous thrombosis prophylaxis have been ordered including bilateral sequential compression devices    Procedure Details:     SURGEON:  Mayur Williamson Jr, MD    PREOPERATIVE DIAGNOSIS:    Recalcitrant right carpal tunnel syndrome of the median nerve at the  wrist with significant pain and dysfunction.     POSTOPERATIVE DIAGNOSIS:    Recalcitrant right carpal tunnel syndrome of the median nerve at the  wrist with significant scar tissue involving the median nerve and  underlying structures.     OPERATIVE PROCEDURE:    The patient underwent repeat right carpal tunnel release of the  median nerve at the wrist with neurolysis of the median nerve and excision of scar tissue and flexor synovectomy of the wrist/carpal tunnel and hypothenar fat pad flap closure.     ASSISTANT:    Shreyas Chowdhury SA.    ANESTHESIA:    LMA.    ESTIMATED BLOOD LOSS:    Less than 10 mL.    CONDITION:    Stable.    COMPLICATIONS:    None.    SPECIMEN:    Sent to Pathology for further evaluation.    DETAILS OF PROCEDURE:    He is identified preoperatively with his wife.  Risk and benefits surgical invention once again reviewed with them at length.  Risks include but are not limited to risks of anesthesia, infection, bleeding, injury to adjacent structures, paralysis, paresthesias, dysfunction, deformity, scarring, recurrence, nonresolution of  symptoms, possible need for further surgical interventions among others.  We have discussed the preoperative course, the operative procedure, the postoperative course at length as well.  He is demonstrating significant pain and dysfunction with recalcitrant right carpal tunnel syndrome and significant scar tissue.  We have discussed repeat carpal tunnel release, median nerve neurolysis from scar tissue and hypothenar fat pad flap closure.  We have discussed his current clinical situation and treatment options.  We have also discussed complex regional pain syndrome which he has a history of with his left arm.  We have discussed with anesthesia a preprocedure right axillary block for pain control as well.  He understands all of our discussions.  He wished to proceed with surgical intervention for repeat right carpal tunnel release, median nerve neurolysis, and hypothenar fat pad flap closure.  The consent is obtained.  The right hand is appropriate identified and marked preoperatively with a marking pen.  He received preoperative IV antibiotics.  SCDs are in place.  The preoperative safety checklist was performed.  He was then taken to the operating room and placed in supine position on the operating room table.He was then taken to the operating  room, placed in supine position on the operating room table.  The  Anesthesia Department then appropriately performed LMA anesthesia for  him.  After adequate induction of LMA anesthesia, the anesthesiologist performed a right axillary block under sterile technique with no complications.  Then the patient's right  upper extremity was cleaned, prepped, and draped in the usual sterile  fashion and a proximal upper extremity tourniquet was applied.  The  entire procedure was performed under loupe magnification.  The right  hand was appropriately identified.  The appropriate time-out  procedure was performed.  The Esmarch was then employed to  exsanguinate the right upper  extremity and the proximal upper  extremity tourniquet was inflated.  The right hand was then  repositioned on the hand table and retracted with aluminum hand  retracting system.  The repeat right carpal tunnel release of the  median nerve at the wrist appropriate incision site was marked with a  marking pen incorporating previous scar and angled in ulnar direction  at the proximal and distal ends.  The appropriate incisions were made  with the scalpel.  Subcutaneous tissue was dissected with tenotomy  scissors.  Excellent hemostasis was maintained with electrocautery  and with bipolar electrocautery.  A small Weitlaner retractor and  Ragnell retractors were employed to retract the skin and subcutaneous  tissue.  Further dissection with tenotomy scissors was performed.  I  began proximally and the palmar aponeurosis was identified.  This was  then incised mid centrally and longitudinally with the scalpel.  Further dissection in a proximal to distal direction was then  performed under direct vision with loupe magnification employing the  scalpel and tenotomy scissors.  The median nerve was identified  directly underneath.  The appropriate flaps were dissected, raised,  elevated, and retracted.  The median nerve was identified proximally  and dissected in a proximal to distal direction and maintained free  from injury.  Further dissection was performed under direct vision  with loupe magnification employing the scalpel and tenotomy scissors  for complete release of the median nerve through the carpal tunnel.  The median nerve was demonstrated to be encased in a significant  amount of scar tissue.  This was carefully, gently, and meticulously  dissected in both proximal and distal directions from the median  nerve and it was completely freed from the underlying scar tissue and  attachments.  This was then retracted and further dissection was  performed of the median nerve from significant flexor synovium encasing the  flexor tendons at the wrist level and surrounding the median nerve at this level.  The scar tissue and flexor synovium was excised, removed, and passed off the table to be sent to Pathology  for further evaluation.  The small Weitlaner was employed to retract  the skin and subcutaneous tissue.  Excellent hemostasis was  maintained with bipolar electrocautery.  Dissection continued then in  the medial to ulnar direction beginning proximally at the wrist  level.  The ulnar artery and nerve were appropriately identified.  These were then dissected in a proximal to distal direction and  Guyon's canal was identified and released.  The ulnar artery and  nerve were further dissected and demonstrated to be free of  compressive attachments in Guyon's canal.  Copious irrigation with  saline solution was performed.  I then created the hypothenar fat pad  flap and dissection continued in the medial to ulnar direction from  the undersurface of the skin and fat pad flap overlying the  hypothenar area.  The fat pad flap was appropriately designed,  created, and dissected and then rotated into position to cover the  median nerve at the level of the carpal tunnel.  The median nerve was  then gently retracted, the hypothenar fat pad flap was then sutured  to the radial side of the carpal tunnel with 6 horizontal mattress  sutures of 4-0 Ethibond.  The median nerve was then demonstrated to  be completely free of underlying scar tissue and released.  The  hypothenar fat pad flap was demonstrated to cover the median nerve at  this level and there was sufficient room in the carpal tunnel  demonstrating no significant compression of the median nerve by the  hypothenar fat pad flap.  This allowed passage of 2 hemostats without  difficulty.  Further copious irrigation with saline solution was  performed.  The operative site was then further examined.  The  proximal upper extremity tourniquet was then released.  Total  tourniquet time was 69  minutes.  Excellent hemostasis maintained with  bipolar electrocautery.  Further copious irrigation with saline  solution was performed.  Excellent hemostasis was demonstrated.  The  operative site was then closed with several simple interrupted and a  few intermittent horizontal mattress sutures of 4-0 nylon.  Right  hand was further cleaned and dried.  The operative site area was then  injected in local circumferential fashion with approximately 8 mL of  1:1 mixture of 2% plain lidocaine solution and 0.25% plain Marcaine  solution for prolonged postoperative pain relief.  Bacitracin,  Xeroform, and appropriate bulky protective immobilizing sterile  dressing and plaster splint were then fashioned and applied.  Distal  tips of all 5 digits remained pink, warm, and had a capillary refill  of 1 to 2 seconds after dressing and splint application.  He  tolerated the procedure very well.  He awoke from anesthesia without  difficulty and was transferred to the recovery room in stable  condition.     Dr. Mayur Williamson MD    Complications:  None; patient tolerated the procedure well.    Disposition: PACU - hemodynamically stable.  Condition: stable     Tourniquet Times:     Total Tourniquet Time Documented:  Arm - Upper (Right) - 69 minutes  Total: Arm - Upper (Right) - 69 minutes       Findings: Significant scar tissue and flexor synovium in the right carpal tunnel    Attending Attestation: I was present and scrubbed for the entire procedure.    Mayur Williamson  Phone Number: [phone]

## 2025-02-21 NOTE — ANESTHESIA PROCEDURE NOTES
Peripheral Block    Patient location during procedure: OR  Start time: 2/21/2025 11:25 AM  End time: 2/21/2025 11:29 AM  Reason for block: at surgeon's request and post-op pain management  Staffing  Performed: attending   Authorized by: Sb Rabago MD    Performed by: Sb Rabago MD  Preanesthetic Checklist  Completed: patient identified, IV checked, site marked, risks and benefits discussed, surgical consent, monitors and equipment checked, pre-op evaluation and timeout performed   Timeout performed at: 2/21/2025 11:24 AM  Peripheral Block  Patient position: laying flat  Prep: ChloraPrep  Patient monitoring: heart rate, cardiac monitor and continuous pulse ox  Block type: axillary  Laterality: right  Injection technique: single-shot  Local infiltration: lidocaine  Infiltration strength: 2 %  Dose: 4 mL  Needle  Needle type: pencil-tip   Needle gauge: 21 G  Needle length: 5 cm  Needle localization: anatomical landmarks, nerve stimulator and ultrasound guidance  Needle insertion depth: 3 cm  Test dose: negative  Assessment  Injection assessment: negative aspiration for heme, no paresthesia on injection, incremental injection and local visualized surrounding nerve on ultrasound  Paresthesia pain: none  Heart rate change: no  Slow fractionated injection: yes  Additional Notes  30 ml of 0.5% Bupivacaine with Epinephrine 1:200 000 mixed with 100 mcg of Clonidine and 10 mg of Decadron. Slow, incremental injection of the mix with consistently negative aspirations between bouts of injections. Patient tolerated procedure well without immediately observable complications.

## 2025-02-21 NOTE — ANESTHESIA PREPROCEDURE EVALUATION
Patient: Sundar Gentile    Procedure Information       Date/Time: 02/21/25 1015    Procedures:       DECOMPRESSION, NERVE, MEDIAN, OPEN, WITH CARPAL TUNNEL RELEASE (Right) - 2 hours      CREATION, FLAP, PEDICLE, UPPER EXTREMITY (Right)    Location: [facility] OR 05 / [facility]    Surgeons: Mayur Williamson MD            Relevant Problems   Cardiac   (+) Hyperlipidemia   (+) Hypertension      Neuro   (+) Right carpal tunnel syndrome   (+) Right cervical radiculopathy   (+) Situational depression      GI   (+) Hiatal hernia      Musculoskeletal   (+) Right carpal tunnel syndrome      HEENT   (+) Sinusitis       Clinical information reviewed:   Tobacco  Allergies  Meds  Problems  Med Hx  Surg Hx   Fam Hx  Soc   Hx        NPO Detail:  NPO/Void Status  Carbohydrate Drink Given Prior to Surgery? : N  Date of Last Liquid: 02/20/25  Time of Last Liquid: 2000  Date of Last Solid: 02/20/25  Time of Last Solid: 2000  Last Intake Type: Food  Time of Last Void: 0730         Physical Exam    Airway  Mallampati: I  TM distance: >3 FB  Neck ROM: full     Cardiovascular - normal exam  Rhythm: regular  Rate: normal     Dental - normal exam     Pulmonary - normal exam  Breath sounds clear to auscultation     Abdominal   (+) obese  Abdomen: soft  Bowel sounds: normal           Anesthesia Plan    History of general anesthesia?: yes  History of complications of general anesthesia?: no    ASA 3     general and regional   (Axillary Brachial Plexus Block)  The patient is not a current smoker.  Patient was previously instructed to abstain from smoking on day of procedure.  Patient did not smoke on day of procedure.  Education provided regarding risk of obstructive sleep apnea.  intravenous induction   Postoperative administration of opioids is intended.  Anesthetic plan and risks discussed with patient.  Use of blood products discussed with patient who consented to blood products.    Plan discussed with [facility]AA.

## 2025-02-21 NOTE — ANESTHESIA PROCEDURE NOTES
Airway  Date/Time: 2/21/2025 11:19 AM  Urgency: elective    Airway not difficult    Staffing  Performed: GERARD   Authorized by: Sb Rabago MD    Performed by: GERARD Fernando  Patient location during procedure: OR    Indications and Patient Condition  Indications for airway management: anesthesia  Spontaneous Ventilation: absent  Sedation level: deep  Preoxygenated: yes  Patient position: sniffing  MILS maintained throughout  Mask difficulty assessment: 1 - vent by mask    Final Airway Details  Final airway type: supraglottic airway      Successful airway: classic  Size 5  Airway Seal Pressure (cm H2O): 10     Number of attempts at approach: 1  Number of other approaches attempted: 0    Additional Comments  Lips and teeth in pre anesthetic conditions following LMA placement

## 2025-02-21 NOTE — DISCHARGE INSTRUCTIONS
Post-Operative Instructions    These discharge instructions provide you with general information on caring for yourself after you leave the hospital. Your doctor may also give you specific instructions. If you have any problems or questions after discharge, please call Dr. Williamson office [phone].  Home Going Instructions:  Take over-the-counter or prescription medications for pain as directed. Resume your usual medications at home.   Keep your hand raised (elevated) for 2-3 days on 4-5 pillows above the level of your heart as much possible even when sleeping. This keeps swelling down and helps with discomfort.  Gently move your fingers to prevent stiffness, DO NOT USE HAND. Straighten fingers to full extension. No gripping weights, grabbing, pushing, pulling, lifting or carrying.  Use hand for very minimal activity. Ex: Buttons, Zippers.  When showering cover hand with a plastic bag to keep the dressing clean, dry and intact.   DO NOT CHANGE DRESSING, Dr. Williamson will remove dressing in his office during the follow up visit in 3-4 days.  Call your Doctor at his office if:  You develop severe pain not relieved by medications.  You develop bleeding from your surgical site.  You have an oral temperature about 101°F.  You develop redness or swelling of the surgical site.  SEEK IMMEDIATE MEDICAL CARE IF: You have chest pain, difficulty breathy, and/or if you develop a reaction or side effects to medication given.  For Your Safety since you were given sedation/anesthesia and will be taking pain medication:  Someone should stay with you for 24 hours.  Change positions slowly.

## 2025-02-21 NOTE — ANESTHESIA POSTPROCEDURE EVALUATION
Patient: Sundar Gentile    Procedure Summary       Date: 02/21/25 Room / Location: [facility] OR 05 / [facility]    Anesthesia Start: 1109 Anesthesia Stop: 1418    Procedures:       DECOMPRESSION, NERVE, MEDIAN, OPEN, WITH CARPAL TUNNEL RELEASE (Right)      CREATION, FLAP, PEDICLE, UPPER EXTREMITY (Right) Diagnosis:       Right carpal tunnel syndrome      (Right carpal tunnel syndrome [G56.01])    Surgeons: Mayur Williamson MD Responsible Provider: Sb Rabago MD    Anesthesia Type: general, regional ASA Status: 3            Anesthesia Type: general, regional    Vitals Value Taken Time   /78 02/21/25 1415   Temp 36.5 02/21/25 1418   Pulse 68 02/21/25 1417   Resp 12 02/21/25 1417   SpO2 92 % 02/21/25 1417   Vitals shown include unfiled device data.    Anesthesia Post Evaluation    Patient location during evaluation: PACU  Patient participation: complete - patient participated  Level of consciousness: awake and alert  Pain management: satisfactory to patient  Airway patency: patent  Cardiovascular status: acceptable  Respiratory status: acceptable, spontaneous ventilation and room air  Hydration status: acceptable  Postoperative Nausea and Vomiting: none        No notable events documented.     well developed, well nourished, in no acute distress

## 2025-02-22 NOTE — ADDENDUM NOTE
Addendum  created 02/21/25 2051 by Sb Rabago MD    Child order released for a procedure order, Clinical Note Signed, Intraprocedure Blocks edited, SmartForm saved

## 2025-02-25 LAB
LABORATORY COMMENT REPORT: NORMAL
PATH REPORT.FINAL DX SPEC: NORMAL
PATH REPORT.GROSS SPEC: NORMAL
PATH REPORT.RELEVANT HX SPEC: NORMAL
PATH REPORT.TOTAL CANCER: NORMAL

## 2025-03-02 LAB
ATRIAL RATE: 62 BPM
P AXIS: 50 DEGREES
P OFFSET: 191 MS
P ONSET: 127 MS
PR INTERVAL: 184 MS
Q ONSET: 219 MS
QRS COUNT: 11 BEATS
QRS DURATION: 136 MS
QT INTERVAL: 412 MS
QTC CALCULATION(BAZETT): 418 MS
QTC FREDERICIA: 416 MS
R AXIS: -47 DEGREES
T AXIS: 0 DEGREES
T OFFSET: 425 MS
VENTRICULAR RATE: 62 BPM

## 2025-03-09 ENCOUNTER — HOSPITAL ENCOUNTER (EMERGENCY)
Facility: HOSPITAL | Age: 60
Discharge: HOME | End: 2025-03-09
Attending: EMERGENCY MEDICINE
Payer: COMMERCIAL

## 2025-03-09 VITALS
RESPIRATION RATE: 18 BRPM | HEART RATE: 64 BPM | WEIGHT: 240.3 LBS | TEMPERATURE: 97.9 F | OXYGEN SATURATION: 98 % | BODY MASS INDEX: 33.64 KG/M2 | DIASTOLIC BLOOD PRESSURE: 85 MMHG | HEIGHT: 71 IN | SYSTOLIC BLOOD PRESSURE: 144 MMHG

## 2025-03-09 DIAGNOSIS — T14.8XXA WOUND INFECTION: Primary | ICD-10-CM

## 2025-03-09 DIAGNOSIS — L08.9 WOUND INFECTION: Primary | ICD-10-CM

## 2025-03-09 PROCEDURE — 99284 EMERGENCY DEPT VISIT MOD MDM: CPT | Performed by: EMERGENCY MEDICINE

## 2025-03-09 PROCEDURE — 2500000001 HC RX 250 WO HCPCS SELF ADMINISTERED DRUGS (ALT 637 FOR MEDICARE OP): Performed by: EMERGENCY MEDICINE

## 2025-03-09 PROCEDURE — 87077 CULTURE AEROBIC IDENTIFY: CPT | Mod: STJLAB | Performed by: EMERGENCY MEDICINE

## 2025-03-09 PROCEDURE — 99283 EMERGENCY DEPT VISIT LOW MDM: CPT | Performed by: EMERGENCY MEDICINE

## 2025-03-09 RX ORDER — DOXYCYCLINE 100 MG/1
100 CAPSULE ORAL ONCE
Status: COMPLETED | OUTPATIENT
Start: 2025-03-09 | End: 2025-03-09

## 2025-03-09 RX ORDER — DOXYCYCLINE 100 MG/1
100 CAPSULE ORAL 2 TIMES DAILY
Qty: 20 CAPSULE | Refills: 0 | Status: SHIPPED | OUTPATIENT
Start: 2025-03-09 | End: 2025-03-19

## 2025-03-09 RX ADMIN — DOXYCYCLINE HYCLATE 100 MG: 100 CAPSULE ORAL at 14:27

## 2025-03-09 ASSESSMENT — LIFESTYLE VARIABLES
HAVE PEOPLE ANNOYED YOU BY CRITICIZING YOUR DRINKING: NO
EVER FELT BAD OR GUILTY ABOUT YOUR DRINKING: NO
HAVE YOU EVER FELT YOU SHOULD CUT DOWN ON YOUR DRINKING: NO
EVER HAD A DRINK FIRST THING IN THE MORNING TO STEADY YOUR NERVES TO GET RID OF A HANGOVER: NO
TOTAL SCORE: 0

## 2025-03-09 ASSESSMENT — COLUMBIA-SUICIDE SEVERITY RATING SCALE - C-SSRS
2. HAVE YOU ACTUALLY HAD ANY THOUGHTS OF KILLING YOURSELF?: NO
6. HAVE YOU EVER DONE ANYTHING, STARTED TO DO ANYTHING, OR PREPARED TO DO ANYTHING TO END YOUR LIFE?: NO
1. IN THE PAST MONTH, HAVE YOU WISHED YOU WERE DEAD OR WISHED YOU COULD GO TO SLEEP AND NOT WAKE UP?: NO

## 2025-03-09 ASSESSMENT — PAIN - FUNCTIONAL ASSESSMENT: PAIN_FUNCTIONAL_ASSESSMENT: 0-10

## 2025-03-09 NOTE — DISCHARGE INSTRUCTIONS
At this time there is a concern there may be an infection of your surgical wound, continue cleaning the dressings, washing it daily as Dr. Williamson had specified, and begin taking the doxycycline as directed.  Is important to see him this week in the office sooner than Thursday if possible, note that the wound culture that was obtained today will take 24 to 48 hours to culture and results otherwise we want you to take the doxycycline starting now.    Return to the emergency room for any increasing pain, swelling, redness, fevers and chills or any worsening concerning symptoms.
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
## 2025-03-13 LAB
BACTERIA SPEC CULT: ABNORMAL
GRAM STN SPEC: ABNORMAL
GRAM STN SPEC: ABNORMAL

## 2025-03-14 ENCOUNTER — TELEPHONE (OUTPATIENT)
Dept: PHARMACY | Facility: HOSPITAL | Age: 60
End: 2025-03-14
Payer: COMMERCIAL

## 2025-03-14 NOTE — TELEPHONE ENCOUNTER
EDPD Note: Rapid Result Review    I reviewed Sundar Gentile 's chart regarding a positive tissue culture/result that was taken during their recent emergency room visit. The patient was not told about these results prior to leaving the emergency department. Therefore, patient was contacted and given appropriate education.     Patient saw Dr. Kel Williamson, [facility], who operated on his wrist and was told to stop doxycyline and was started on new medications:     Ciprofloxacin 500 mg 1 tab PO BID for 14 days   Bactrim DS 1 tab PO BID for 14 days       Susceptibility data from last 90 days.  Collected Specimen Info Organism Amoxicillin/Clavulanate Ampicillin Ampicillin/Sulbactam Cefazolin Cefepime Ciprofloxacin Gentamicin Levofloxacin Piperacillin/Tazobactam Trimethoprim/Sulfamethoxazole   03/09/25 Tissue/Biopsy from Wound/Tissue Enterobacter cloacae complex  R  R  R  R  S  S  S  S  S  S     Admission on 03/09/2025, Discharged on 03/09/2025   Component Date Value Ref Range Status    Tissue/Wound Culture/Smear 03/09/2025 (4+) Abundant Enterobacter cloacae complex (A)   Final    SECOND and THIRD generation cephalosporin results are not reported as resistance may develop during therapy with these agents.    Gram Stain 03/09/2025 No polymorphonuclear leukocytes seen (A)   Final    Gram Stain 03/09/2025 (2+) Few Gram negative bacilli (A)   Final       No further follow up needed from EDPD Team.     If there are any other questions for the ED Post-Discharge Culture Follow Up Team, please contact [phone]. Fax: [phone].    Brenda Catherine, ZanaD

## 2025-03-28 ENCOUNTER — HOSPITAL ENCOUNTER (INPATIENT)
Facility: HOSPITAL | Age: 60
LOS: 7 days | Discharge: HOME | End: 2025-04-04
Payer: COMMERCIAL

## 2025-03-28 ENCOUNTER — HOSPITAL ENCOUNTER (EMERGENCY)
Facility: HOSPITAL | Age: 60
Discharge: STILL ADMITTED OR ADMINISTRATIVE | DRG: 857 | End: 2025-03-28
Payer: COMMERCIAL

## 2025-03-28 ENCOUNTER — APPOINTMENT (OUTPATIENT)
Dept: INFECTIOUS DISEASES | Facility: CLINIC | Age: 60
End: 2025-03-28
Payer: COMMERCIAL

## 2025-03-28 ENCOUNTER — APPOINTMENT (OUTPATIENT)
Dept: RADIOLOGY | Facility: HOSPITAL | Age: 60
End: 2025-03-28
Payer: COMMERCIAL

## 2025-03-28 DIAGNOSIS — T81.49XA WOUND INFECTION AFTER SURGERY: Primary | ICD-10-CM

## 2025-03-28 LAB
ANION GAP SERPL CALC-SCNC: 15 MMOL/L (ref 10–20)
BACTERIA SPEC CULT: ABNORMAL
BUN SERPL-MCNC: 12 MG/DL (ref 6–23)
CALCIUM SERPL-MCNC: 8.4 MG/DL (ref 8.6–10.3)
CHLORIDE SERPL-SCNC: 103 MMOL/L (ref 98–107)
CO2 SERPL-SCNC: 23 MMOL/L (ref 21–32)
CREAT SERPL-MCNC: 0.89 MG/DL (ref 0.5–1.3)
EGFRCR SERPLBLD CKD-EPI 2021: >90 ML/MIN/1.73M*2
ERYTHROCYTE [DISTWIDTH] IN BLOOD BY AUTOMATED COUNT: 13.5 % (ref 11.5–14.5)
GLUCOSE SERPL-MCNC: 106 MG/DL (ref 74–99)
GRAM STN SPEC: ABNORMAL
GRAM STN SPEC: ABNORMAL
HCT VFR BLD AUTO: 42.1 % (ref 41–52)
HGB BLD-MCNC: 13.8 G/DL (ref 13.5–17.5)
MCH RBC QN AUTO: 31.4 PG (ref 26–34)
MCHC RBC AUTO-ENTMCNC: 32.8 G/DL (ref 32–36)
MCV RBC AUTO: 96 FL (ref 80–100)
NRBC BLD-RTO: 0 /100 WBCS (ref 0–0)
PLATELET # BLD AUTO: 212 X10*3/UL (ref 150–450)
POTASSIUM SERPL-SCNC: 3.7 MMOL/L (ref 3.5–5.3)
RBC # BLD AUTO: 4.39 X10*6/UL (ref 4.5–5.9)
SODIUM SERPL-SCNC: 137 MMOL/L (ref 136–145)
WBC # BLD AUTO: 5.4 X10*3/UL (ref 4.4–11.3)

## 2025-03-28 PROCEDURE — 2500000004 HC RX 250 GENERAL PHARMACY W/ HCPCS (ALT 636 FOR OP/ED): Performed by: PHYSICIAN ASSISTANT

## 2025-03-28 PROCEDURE — 36573 INSJ PICC RS&I 5 YR+: CPT

## 2025-03-28 PROCEDURE — 2500000001 HC RX 250 WO HCPCS SELF ADMINISTERED DRUGS (ALT 637 FOR MEDICARE OP): Performed by: NURSE PRACTITIONER

## 2025-03-28 PROCEDURE — 85027 COMPLETE CBC AUTOMATED: CPT

## 2025-03-28 PROCEDURE — 80048 BASIC METABOLIC PNL TOTAL CA: CPT

## 2025-03-28 PROCEDURE — 36415 COLL VENOUS BLD VENIPUNCTURE: CPT

## 2025-03-28 PROCEDURE — 1100000001 HC PRIVATE ROOM DAILY

## 2025-03-28 PROCEDURE — 82374 ASSAY BLOOD CARBON DIOXIDE: CPT

## 2025-03-28 PROCEDURE — 4500999001 HC ED NO CHARGE

## 2025-03-28 PROCEDURE — 2500000004 HC RX 250 GENERAL PHARMACY W/ HCPCS (ALT 636 FOR OP/ED): Performed by: INTERNAL MEDICINE

## 2025-03-28 PROCEDURE — 36410 VNPNXR 3YR/> PHY/QHP DX/THER: CPT

## 2025-03-28 RX ORDER — PHENOBARBITAL 64.8 MG/1
60 TABLET ORAL 2 TIMES DAILY
Status: DISCONTINUED | OUTPATIENT
Start: 2025-03-28 | End: 2025-03-28

## 2025-03-28 RX ORDER — LOSARTAN POTASSIUM 100 MG/1
100 TABLET ORAL NIGHTLY
Status: DISCONTINUED | OUTPATIENT
Start: 2025-03-28 | End: 2025-04-04 | Stop reason: HOSPADM

## 2025-03-28 RX ORDER — ATORVASTATIN CALCIUM 20 MG/1
20 TABLET, FILM COATED ORAL NIGHTLY
Status: DISCONTINUED | OUTPATIENT
Start: 2025-03-28 | End: 2025-04-04 | Stop reason: HOSPADM

## 2025-03-28 RX ORDER — NAPROXEN 500 MG/1
500 TABLET ORAL
Status: COMPLETED | OUTPATIENT
Start: 2025-03-29 | End: 2025-03-30

## 2025-03-28 RX ORDER — MORPHINE SULFATE 2 MG/ML
1 INJECTION, SOLUTION INTRAMUSCULAR; INTRAVENOUS ONCE
Status: COMPLETED | OUTPATIENT
Start: 2025-03-28 | End: 2025-03-28

## 2025-03-28 RX ORDER — CYCLOBENZAPRINE HCL 10 MG
10 TABLET ORAL 3 TIMES DAILY PRN
Status: DISCONTINUED | OUTPATIENT
Start: 2025-03-28 | End: 2025-04-04 | Stop reason: HOSPADM

## 2025-03-28 RX ORDER — LIDOCAINE HYDROCHLORIDE 10 MG/ML
5 INJECTION, SOLUTION EPIDURAL; INFILTRATION; INTRACAUDAL; PERINEURAL ONCE
Status: DISCONTINUED | OUTPATIENT
Start: 2025-03-28 | End: 2025-03-31

## 2025-03-28 RX ORDER — SULFAMETHOXAZOLE AND TRIMETHOPRIM 800; 160 MG/1; MG/1
1 TABLET ORAL 2 TIMES DAILY
COMMUNITY
Start: 2025-03-25 | End: 2025-04-07

## 2025-03-28 RX ORDER — ERTAPENEM 1 G/1
1 INJECTION, POWDER, LYOPHILIZED, FOR SOLUTION INTRAMUSCULAR; INTRAVENOUS EVERY 24 HOURS
Status: DISCONTINUED | OUTPATIENT
Start: 2025-03-28 | End: 2025-04-04 | Stop reason: HOSPADM

## 2025-03-28 RX ORDER — OXYCODONE AND ACETAMINOPHEN 5; 325 MG/1; MG/1
1 TABLET ORAL EVERY 6 HOURS PRN
Status: DISCONTINUED | OUTPATIENT
Start: 2025-03-28 | End: 2025-04-01

## 2025-03-28 RX ADMIN — ATORVASTATIN CALCIUM 20 MG: 20 TABLET, FILM COATED ORAL at 21:14

## 2025-03-28 RX ADMIN — LOSARTAN POTASSIUM 100 MG: 100 TABLET, FILM COATED ORAL at 21:14

## 2025-03-28 RX ADMIN — OXYCODONE HYDROCHLORIDE AND ACETAMINOPHEN 1 TABLET: 5; 325 TABLET ORAL at 21:39

## 2025-03-28 RX ADMIN — MORPHINE SULFATE 1 MG: 2 INJECTION, SOLUTION INTRAMUSCULAR; INTRAVENOUS at 16:26

## 2025-03-28 RX ADMIN — ERTAPENEM SODIUM 1 G: 1 INJECTION, POWDER, LYOPHILIZED, FOR SOLUTION INTRAMUSCULAR; INTRAVENOUS at 17:31

## 2025-03-28 SDOH — ECONOMIC STABILITY: FOOD INSECURITY: WITHIN THE PAST 12 MONTHS, THE FOOD YOU BOUGHT JUST DIDN'T LAST AND YOU DIDN'T HAVE MONEY TO GET MORE.: NEVER TRUE

## 2025-03-28 SDOH — SOCIAL STABILITY: SOCIAL INSECURITY: DOES ANYONE TRY TO KEEP YOU FROM HAVING/CONTACTING OTHER FRIENDS OR DOING THINGS OUTSIDE YOUR HOME?: NO

## 2025-03-28 SDOH — SOCIAL STABILITY: SOCIAL INSECURITY: WERE YOU ABLE TO COMPLETE ALL THE BEHAVIORAL HEALTH SCREENINGS?: YES

## 2025-03-28 SDOH — SOCIAL STABILITY: SOCIAL INSECURITY: WITHIN THE LAST YEAR, HAVE YOU BEEN HUMILIATED OR EMOTIONALLY ABUSED IN OTHER WAYS BY YOUR PARTNER OR EX-PARTNER?: NO

## 2025-03-28 SDOH — SOCIAL STABILITY: SOCIAL INSECURITY: ARE YOU OR HAVE YOU BEEN THREATENED OR ABUSED PHYSICALLY, EMOTIONALLY, OR SEXUALLY BY ANYONE?: NO

## 2025-03-28 SDOH — ECONOMIC STABILITY: FOOD INSECURITY: WITHIN THE PAST 12 MONTHS, YOU WORRIED THAT YOUR FOOD WOULD RUN OUT BEFORE YOU GOT THE MONEY TO BUY MORE.: NEVER TRUE

## 2025-03-28 SDOH — SOCIAL STABILITY: SOCIAL INSECURITY: DO YOU FEEL ANYONE HAS EXPLOITED OR TAKEN ADVANTAGE OF YOU FINANCIALLY OR OF YOUR PERSONAL PROPERTY?: NO

## 2025-03-28 SDOH — SOCIAL STABILITY: SOCIAL INSECURITY
WITHIN THE LAST YEAR, HAVE YOU BEEN RAPED OR FORCED TO HAVE ANY KIND OF SEXUAL ACTIVITY BY YOUR PARTNER OR EX-PARTNER?: NO

## 2025-03-28 SDOH — ECONOMIC STABILITY: FOOD INSECURITY: HOW HARD IS IT FOR YOU TO PAY FOR THE VERY BASICS LIKE FOOD, HOUSING, MEDICAL CARE, AND HEATING?: NOT HARD AT ALL

## 2025-03-28 SDOH — SOCIAL STABILITY: SOCIAL INSECURITY: WITHIN THE LAST YEAR, HAVE YOU BEEN AFRAID OF YOUR PARTNER OR EX-PARTNER?: NO

## 2025-03-28 SDOH — ECONOMIC STABILITY: INCOME INSECURITY: IN THE PAST 12 MONTHS HAS THE ELECTRIC, GAS, OIL, OR WATER COMPANY THREATENED TO SHUT OFF SERVICES IN YOUR HOME?: NO

## 2025-03-28 SDOH — SOCIAL STABILITY: SOCIAL INSECURITY: ABUSE: ADULT

## 2025-03-28 SDOH — SOCIAL STABILITY: SOCIAL INSECURITY: DO YOU FEEL UNSAFE GOING BACK TO THE PLACE WHERE YOU ARE LIVING?: NO

## 2025-03-28 SDOH — SOCIAL STABILITY: SOCIAL INSECURITY
WITHIN THE LAST YEAR, HAVE YOU BEEN KICKED, HIT, SLAPPED, OR OTHERWISE PHYSICALLY HURT BY YOUR PARTNER OR EX-PARTNER?: NO

## 2025-03-28 SDOH — SOCIAL STABILITY: SOCIAL INSECURITY: HAVE YOU HAD THOUGHTS OF HARMING ANYONE ELSE?: NO

## 2025-03-28 SDOH — SOCIAL STABILITY: SOCIAL INSECURITY: HAS ANYONE EVER THREATENED TO HURT YOUR FAMILY OR YOUR PETS?: NO

## 2025-03-28 SDOH — SOCIAL STABILITY: SOCIAL INSECURITY: ARE THERE ANY APPARENT SIGNS OF INJURIES/BEHAVIORS THAT COULD BE RELATED TO ABUSE/NEGLECT?: NO

## 2025-03-28 ASSESSMENT — COGNITIVE AND FUNCTIONAL STATUS - GENERAL
DAILY ACTIVITIY SCORE: 24
DAILY ACTIVITIY SCORE: 24
PATIENT BASELINE BEDBOUND: NO
MOBILITY SCORE: 24
MOBILITY SCORE: 24

## 2025-03-28 ASSESSMENT — ACTIVITIES OF DAILY LIVING (ADL)
BATHING: INDEPENDENT
PATIENT'S MEMORY ADEQUATE TO SAFELY COMPLETE DAILY ACTIVITIES?: YES
GROOMING: INDEPENDENT
TOILETING: INDEPENDENT
JUDGMENT_ADEQUATE_SAFELY_COMPLETE_DAILY_ACTIVITIES: YES
HEARING - LEFT EAR: FUNCTIONAL
DRESSING YOURSELF: INDEPENDENT
LACK_OF_TRANSPORTATION: NO
ADEQUATE_TO_COMPLETE_ADL: YES
WALKS IN HOME: INDEPENDENT
FEEDING YOURSELF: INDEPENDENT
HEARING - RIGHT EAR: FUNCTIONAL

## 2025-03-28 ASSESSMENT — LIFESTYLE VARIABLES
HOW OFTEN DO YOU HAVE 6 OR MORE DRINKS ON ONE OCCASION: NEVER
AUDIT-C TOTAL SCORE: 0
HOW MANY STANDARD DRINKS CONTAINING ALCOHOL DO YOU HAVE ON A TYPICAL DAY: PATIENT DOES NOT DRINK
AUDIT-C TOTAL SCORE: 0
SKIP TO QUESTIONS 9-10: 1
HOW OFTEN DO YOU HAVE A DRINK CONTAINING ALCOHOL: NEVER

## 2025-03-28 ASSESSMENT — PAIN SCALES - GENERAL
PAINLEVEL_OUTOF10: 7
PAINLEVEL_OUTOF10: 7
PAINLEVEL_OUTOF10: 0 - NO PAIN
PAINLEVEL_OUTOF10: 7

## 2025-03-28 ASSESSMENT — PAIN DESCRIPTION - LOCATION: LOCATION: HAND

## 2025-03-28 ASSESSMENT — PAIN - FUNCTIONAL ASSESSMENT
PAIN_FUNCTIONAL_ASSESSMENT: 0-10

## 2025-03-28 ASSESSMENT — PATIENT HEALTH QUESTIONNAIRE - PHQ9
1. LITTLE INTEREST OR PLEASURE IN DOING THINGS: NOT AT ALL
2. FEELING DOWN, DEPRESSED OR HOPELESS: NOT AT ALL
SUM OF ALL RESPONSES TO PHQ9 QUESTIONS 1 & 2: 0

## 2025-03-28 ASSESSMENT — PAIN SCALES - PAIN ASSESSMENT IN ADVANCED DEMENTIA (PAINAD): TOTALSCORE: RELAXATION TECHNIQUE

## 2025-03-28 ASSESSMENT — PAIN DESCRIPTION - ORIENTATION: ORIENTATION: RIGHT

## 2025-03-28 NOTE — CONSULTS
Reason For Consult  Midline Placement     Education completed on risks/benefits/infection prevention/maintenance of line insertion  and patient verbalized understanding and gave verbal consent. PowerGlide Pro Midline Catheter inserted to MAIKOL basilic vein with ultrasound guidance. Brisk blood return noted and line flushes easily. See LDA for details. Line secured with Stat-lock, biopatch placed and Midline Tegaderm dressing applied. Midline labeled per policy. Curos cap applied. Limb alert applied to left wrist. Bedside RN updated and discharge information placed in chart. Patient's bed in lowest position and call light w/n reach.    Midline ready for immediate use.        Kalli Smith RN

## 2025-03-28 NOTE — CARE PLAN
The patient's goals for the shift include      The clinical goals for the shift include Comfort      Problem: Skin  Goal: Decreased wound size/increased tissue granulation at next dressing change  Flowsheets (Taken 3/28/2025 1215)  Decreased wound size/increased tissue granulation at next dressing change: Promote sleep for wound healing  Goal: Participates in plan/prevention/treatment measures  Flowsheets (Taken 3/28/2025 1215)  Participates in plan/prevention/treatment measures: Elevate heels
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
## 2025-03-29 ENCOUNTER — APPOINTMENT (OUTPATIENT)
Dept: RADIOLOGY | Facility: HOSPITAL | Age: 60
End: 2025-03-29
Payer: COMMERCIAL

## 2025-03-29 PROCEDURE — 1100000001 HC PRIVATE ROOM DAILY

## 2025-03-29 PROCEDURE — 2500000001 HC RX 250 WO HCPCS SELF ADMINISTERED DRUGS (ALT 637 FOR MEDICARE OP): Performed by: NURSE PRACTITIONER

## 2025-03-29 PROCEDURE — 70450 CT HEAD/BRAIN W/O DYE: CPT

## 2025-03-29 PROCEDURE — 70450 CT HEAD/BRAIN W/O DYE: CPT | Performed by: RADIOLOGY

## 2025-03-29 PROCEDURE — 2500000004 HC RX 250 GENERAL PHARMACY W/ HCPCS (ALT 636 FOR OP/ED): Performed by: INTERNAL MEDICINE

## 2025-03-29 RX ADMIN — LOSARTAN POTASSIUM 100 MG: 100 TABLET, FILM COATED ORAL at 20:35

## 2025-03-29 RX ADMIN — OXYCODONE HYDROCHLORIDE AND ACETAMINOPHEN 1 TABLET: 5; 325 TABLET ORAL at 14:14

## 2025-03-29 RX ADMIN — NAPROXEN 500 MG: 500 TABLET ORAL at 08:53

## 2025-03-29 RX ADMIN — NAPROXEN 500 MG: 500 TABLET ORAL at 12:38

## 2025-03-29 RX ADMIN — ERTAPENEM SODIUM 1 G: 1 INJECTION, POWDER, LYOPHILIZED, FOR SOLUTION INTRAMUSCULAR; INTRAVENOUS at 16:47

## 2025-03-29 RX ADMIN — NAPROXEN 500 MG: 500 TABLET ORAL at 16:49

## 2025-03-29 RX ADMIN — ATORVASTATIN CALCIUM 20 MG: 20 TABLET, FILM COATED ORAL at 20:35

## 2025-03-29 SDOH — ECONOMIC STABILITY: HOUSING INSECURITY: IN THE LAST 12 MONTHS, HOW MANY PLACES HAVE YOU LIVED?: 1

## 2025-03-29 SDOH — ECONOMIC STABILITY: GENERAL

## 2025-03-29 SDOH — ECONOMIC STABILITY: FOOD INSECURITY: WITHIN THE PAST 12 MONTHS, THE FOOD YOU BOUGHT JUST DIDN'T LAST AND YOU DIDN'T HAVE MONEY TO GET MORE.: NEVER TRUE

## 2025-03-29 SDOH — ECONOMIC STABILITY: TRANSPORTATION INSECURITY
IN THE PAST 12 MONTHS, HAS THE LACK OF TRANSPORTATION KEPT YOU FROM MEDICAL APPOINTMENTS OR FROM GETTING MEDICATIONS?: NO

## 2025-03-29 SDOH — ECONOMIC STABILITY: FOOD INSECURITY: WITHIN THE PAST 12 MONTHS, YOU WORRIED THAT YOUR FOOD WOULD RUN OUT BEFORE YOU GOT MONEY TO BUY MORE.: NEVER TRUE

## 2025-03-29 SDOH — ECONOMIC STABILITY: TRANSPORTATION INSECURITY: IN THE PAST 12 MONTHS, HAS LACK OF TRANSPORTATION KEPT YOU FROM MEDICAL APPOINTMENTS OR FROM GETTING MEDICATIONS?: NO

## 2025-03-29 SDOH — ECONOMIC STABILITY: HOUSING INSECURITY: IN THE PAST 12 MONTHS HAS THE ELECTRIC, GAS, OIL, OR WATER COMPANY THREATENED TO SHUT OFF SERVICES IN YOUR HOME?: NO

## 2025-03-29 SDOH — ECONOMIC STABILITY: INCOME INSECURITY: IN THE LAST 12 MONTHS, WAS THERE A TIME WHEN YOU WERE NOT ABLE TO PAY THE MORTGAGE OR RENT ON TIME?: NO

## 2025-03-29 SDOH — ECONOMIC STABILITY: FOOD INSECURITY

## 2025-03-29 SDOH — ECONOMIC STABILITY: HOUSING INSECURITY: IN THE LAST 12 MONTHS, WAS THERE A TIME WHEN YOU WERE NOT ABLE TO PAY THE MORTGAGE OR RENT ON TIME?: NO

## 2025-03-29 SDOH — ECONOMIC STABILITY: HOUSING INSECURITY
IN THE LAST 12 MONTHS, WAS THERE A TIME WHEN YOU DID NOT HAVE A STEADY PLACE TO SLEEP OR SLEPT IN A SHELTER (INCLUDING NOW)?: NO

## 2025-03-29 SDOH — ECONOMIC STABILITY: TRANSPORTATION INSECURITY
IN THE PAST 12 MONTHS, HAS LACK OF TRANSPORTATION KEPT YOU FROM MEETINGS, WORK, OR FROM GETTING THINGS NEEDED FOR DAILY LIVING?: NO

## 2025-03-29 SDOH — ECONOMIC STABILITY: FOOD INSECURITY: WITHIN THE PAST 12 MONTHS, YOU WORRIED THAT YOUR FOOD WOULD RUN OUT BEFORE YOU GOT THE MONEY TO BUY MORE.: NEVER TRUE

## 2025-03-29 SDOH — ECONOMIC STABILITY: HOUSING INSECURITY

## 2025-03-29 SDOH — ECONOMIC STABILITY: TRANSPORTATION INSECURITY

## 2025-03-29 ASSESSMENT — COGNITIVE AND FUNCTIONAL STATUS - GENERAL
MOBILITY SCORE: 24
DAILY ACTIVITIY SCORE: 24

## 2025-03-29 ASSESSMENT — PAIN SCALES - GENERAL
PAINLEVEL_OUTOF10: 6
PAINLEVEL_OUTOF10: 0 - NO PAIN
PAINLEVEL_OUTOF10: 8
PAINLEVEL_OUTOF10: 4

## 2025-03-29 ASSESSMENT — SOCIAL DETERMINANTS OF HEALTH (SDOH): IN THE PAST 12 MONTHS, HAS THE ELECTRIC, GAS, OIL, OR WATER COMPANY THREATENED TO SHUT OFF SERVICE IN YOUR HOME?: NO

## 2025-03-29 ASSESSMENT — PAIN - FUNCTIONAL ASSESSMENT: PAIN_FUNCTIONAL_ASSESSMENT: 0-10

## 2025-03-29 ASSESSMENT — ACTIVITIES OF DAILY LIVING (ADL): LACK_OF_TRANSPORTATION: NO

## 2025-03-29 NOTE — CARE PLAN
The patient's goals for the shift include      The clinical goals for the shift include Comfort      Problem: Pain - Adult  Goal: Verbalizes/displays adequate comfort level or baseline comfort level  Outcome: Progressing     Problem: Safety - Adult  Goal: Free from fall injury  Outcome: Progressing     Problem: Chronic Conditions and Co-morbidities  Goal: Patient's chronic conditions and co-morbidity symptoms are monitored and maintained or improved  Outcome: Progressing

## 2025-03-29 NOTE — PROGRESS NOTES
"Sundar Gentile is a 59 y.o. male on day 1 of admission presenting with Wound infection after surgery.    Subjective   He states that his pain is in control and he has maintained dressing as instructed.  He states that overall he is doing well and feeling better.  He states that prior to admission he had a fall injury on Wednesday, 3/26/2025 or Thursday, 3/27/2025 hitting the back of his head and he states that he feels a little \"foggy\".  He denies any double vision or neurologic complaints.       Objective     Physical Exam  In general: He is alert and oriented x 3, pleasant and cooperative, in no acute distress.  Pupils are equal, round, reactive light and extraocular muscles are intact.  Mucous membranes are moist.  He demonstrates full range of motion of all 4 extremities.  The right hand and wrist are examined.  The dressing is removed.  The wound is cleaned and dried.  The distal tips of all 5 digits are pink, warm, capillary refill 1 to 2 seconds.  The distal tips of all 5 digits are intact to sensation of touch.  He demonstrates full range of motion of all 5 digits and the wrist.  Specifically there is minimal serous drainage from the wound.  The wound is open approximately 3 cm in length and 1 cm in width with 3 loosely intact retention sutures in place.  There is trace edema.  There is no erythema.  There is no other drainage or discharge.  There are no other open wounds.  There is minimal discomfort to palpation.  The remainder the exam is within normal limits for him.    Last Recorded Vitals  Blood pressure (!) 122/93, pulse 64, temperature 36.7 °C (98.1 °F), temperature source Temporal, resp. rate 16, height 1.803 m (5' 10.98\"), weight 109 kg (240 lb 4.8 oz), SpO2 96%.  Intake/Output last 3 Shifts:  I/O last 3 completed shifts:  In: 530 (4.9 mL/kg) [P.O.:480; IV Piggyback:50]  Out: - (0 mL/kg)   Weight: 109 kg     Relevant Results    Scheduled medications  atorvastatin, 20 mg, oral, Nightly  ertapenem, 1 " "g, intravenous, q24h  lidocaine, 5 mL, infiltration, Once  losartan, 100 mg, oral, Nightly  naproxen, 500 mg, oral, TID      Continuous medications     PRN medications  PRN medications: alteplase, cyclobenzaprine, oxyCODONE-acetaminophen    Labs  No results found for this or any previous visit (from the past 24 hours).     Imaging  Bedside PICC Imaging    Result Date: 3/28/2025  These images are not reportable by radiology and will not be interpreted by  Radiologists.         Assessment/Plan   Assessment & Plan  Wound infection after surgery    1.  Previous fall injury hitting his head a few days ago before admission to hospital with some reported \"fogginess\".  As he does have surgery scheduled on Monday, 3/31/2025 I will check CT of head given his history of fall injury and \"fogginess\".  Currently no other neurologic findings.  Continue to monitor closely for any changes.    2.  Right volar palm/wrist infected wound.  Continue wound care by me daily with washing and application of clean sterile gauze dressing.  Continue IV antibiotics as directed by infectious disease Dr. Pereira of ertapenem given his previous culture history and Gram stain and recent failure of oral Cipro and Bactrim DS outpatient treatment.  Currently plan surgical intervention of right volar palm/wrist wound exploration irrigation and excisional debridement and complex secondary closure over a drain on Monday, 3/31/2025.    3.  Continue present care       I spent 30 minutes in the professional and overall care of this patient.      Mayur Williamson MD      "

## 2025-03-29 NOTE — CARE PLAN
Patient stable throughout shift and uneventful.     VSS, aox4, independent.     Pt up in chair throughout entire shift, very pleasant.    Pt received Percocet at 1414 due to increased pain.     CT pending due to fall prior to hospitalization.     Safety maintained throughout shift and call light in within reach.

## 2025-03-30 PROCEDURE — 2500000001 HC RX 250 WO HCPCS SELF ADMINISTERED DRUGS (ALT 637 FOR MEDICARE OP): Performed by: NURSE PRACTITIONER

## 2025-03-30 PROCEDURE — 1100000001 HC PRIVATE ROOM DAILY

## 2025-03-30 PROCEDURE — 2500000004 HC RX 250 GENERAL PHARMACY W/ HCPCS (ALT 636 FOR OP/ED): Performed by: INTERNAL MEDICINE

## 2025-03-30 RX ADMIN — NAPROXEN 500 MG: 500 TABLET ORAL at 07:55

## 2025-03-30 RX ADMIN — OXYCODONE HYDROCHLORIDE AND ACETAMINOPHEN 1 TABLET: 5; 325 TABLET ORAL at 11:42

## 2025-03-30 RX ADMIN — OXYCODONE HYDROCHLORIDE AND ACETAMINOPHEN 1 TABLET: 5; 325 TABLET ORAL at 21:58

## 2025-03-30 RX ADMIN — ERTAPENEM SODIUM 1 G: 1 INJECTION, POWDER, LYOPHILIZED, FOR SOLUTION INTRAMUSCULAR; INTRAVENOUS at 16:24

## 2025-03-30 RX ADMIN — NAPROXEN 500 MG: 500 TABLET ORAL at 16:24

## 2025-03-30 RX ADMIN — NAPROXEN 500 MG: 500 TABLET ORAL at 11:42

## 2025-03-30 RX ADMIN — ATORVASTATIN CALCIUM 20 MG: 20 TABLET, FILM COATED ORAL at 21:32

## 2025-03-30 RX ADMIN — LOSARTAN POTASSIUM 100 MG: 100 TABLET, FILM COATED ORAL at 21:32

## 2025-03-30 ASSESSMENT — COGNITIVE AND FUNCTIONAL STATUS - GENERAL
DAILY ACTIVITIY SCORE: 24
MOBILITY SCORE: 24

## 2025-03-30 ASSESSMENT — PAIN SCALES - GENERAL
PAINLEVEL_OUTOF10: 4
PAINLEVEL_OUTOF10: 4
PAINLEVEL_OUTOF10: 7
PAINLEVEL_OUTOF10: 9
PAINLEVEL_OUTOF10: 0 - NO PAIN

## 2025-03-30 ASSESSMENT — PAIN DESCRIPTION - ORIENTATION: ORIENTATION: RIGHT

## 2025-03-30 ASSESSMENT — PAIN DESCRIPTION - LOCATION: LOCATION: HAND

## 2025-03-30 NOTE — CARE PLAN
Problem: Pain - Adult  Goal: Verbalizes/displays adequate comfort level or baseline comfort level  Outcome: Met     Problem: Safety - Adult  Goal: Free from fall injury  Outcome: Met     Problem: Discharge Planning  Goal: Discharge to home or other facility with appropriate resources  Outcome: Met     Problem: Chronic Conditions and Co-morbidities  Goal: Patient's chronic conditions and co-morbidity symptoms are monitored and maintained or improved  Outcome: Met     Problem: Nutrition  Goal: Nutrient intake appropriate for maintaining nutritional needs  Outcome: Met     Problem: Skin  Goal: Decreased wound size/increased tissue granulation at next dressing change  Outcome: Met  Goal: Participates in plan/prevention/treatment measures  Outcome: Met     Problem: Pain  Goal: Takes deep breaths with improved pain control throughout the shift  Outcome: Met   The patient's goals for the shift include

## 2025-03-30 NOTE — CARE PLAN
Pt kind and calm throughout shift with no acute issues.     VSS, axo4, independent.    Up in chair throughout entire shift, very pleasant.     Pt received Percocet (see MAR) due to increase pain    CT negative.    Pt complained of diarrhea episodes, MD Clay aware

## 2025-03-30 NOTE — PROGRESS NOTES
"Sundar Gentile is a 59 y.o. male on day 2 of admission presenting with Wound infection after surgery.    Subjective   He reports his pain is controlled.  He is tolerating his diet.  He states he has maintained the right hand/wrist dressing as instructed.  He reports 1 episode of diarrhea this morning.  He has had no further bowel movement since this morning.  He did have [employer] delivery last night.  His wife is present with him throughout my entire visit.  They are concerned about possible immune disease due to current and previous infections and request and a blood test for further evaluation.  He denies any other problems or complaints at this time.       Objective     Physical Exam  On physical examination he is alert and oriented x 3, pleasant and cooperative, in no acute distress.    The right hand and wrist are examined.  The dressing was removed.  The wound is cleaned and dried.  The distal tips of all 5 digits are pink, warm, capillary refill 1 to 2 seconds.  The distal tips of all 5 digits are intact to sensation of touch.  He demonstrates full range of passive and active motion of all 5 digits and the wrist.  There is a proximal 3 cm in length 0.75 to 1 cm in greatest width open wound the volar palmar aspect of the right hand and wrist area.  There are 3 sutures loosely approximating tissue in this area.  There is some minimal serous drainage.  There is no purulent drainage.  There is trace edema.  There is no erythema.  There are no other open wounds.  There is minimal discomfort to palpation.  The remainder the exam is within normal limits for him.      Last Recorded Vitals  Blood pressure 147/85, pulse 75, temperature 35.9 °C (96.6 °F), temperature source Temporal, resp. rate 16, height 1.803 m (5' 10.98\"), weight 109 kg (240 lb 4.8 oz), SpO2 94%.  Intake/Output last 3 Shifts:  I/O last 3 completed shifts:  In: 240 (2.2 mL/kg) [P.O.:240]  Out: - (0 mL/kg)   Weight: 109 kg     Relevant " Results    Scheduled medications  atorvastatin, 20 mg, oral, Nightly  ertapenem, 1 g, intravenous, q24h  lidocaine, 5 mL, infiltration, Once  losartan, 100 mg, oral, Nightly      Continuous medications     PRN medications  PRN medications: alteplase, cyclobenzaprine, oxyCODONE-acetaminophen    Labs  No results found for this or any previous visit (from the past 24 hours).     Imaging  CT head wo IV contrast    Result Date: 3/29/2025  Interpreted By:  Forrest Frost, STUDY: CT HEAD WO IV CONTRAST;  3/29/2025 5:28 pm   INDICATION: Signs/Symptoms:fall injury 3 days ago.     COMPARISON: CT 04/07/2014.   ACCESSION NUMBER(S): [id number]   ORDERING CLINICIAN: MATIAS WEBER   TECHNIQUE: Noncontrast axial CT scan of head was performed. Angled reformats in brain and bone windows were generated. The images were reviewed in bone, brain, blood and soft tissue windows.   FINDINGS: No calvarial fracture. No intracranial hemorrhage. No intracranial mass. No evidence of large evolving cortical infarction.   Paranasal sinuses and mastoid air cells are well aerated.       No evidence of acute intracranial hemorrhage or calvarial fracture.       MACRO: None     Signed by: Forrest Frost 3/29/2025 7:45 PM Dictation workstation:   [id number]    Bedside PICC Imaging    Result Date: 3/28/2025  These images are not reportable by radiology and will not be interpreted by  Radiologists.         Assessment/Plan   Assessment & Plan  Wound infection after surgery    #1 right hand palm/wrist wound.  Dressing change performed today by me.  At this point in time significant discussion is once again undertaken with him and his wife concerning his current clinical situation and treatment options.  The CAT scan of his head was performed yesterday and is negative for evidence of current traumatic injury or issues.  I will order CBC, BMP, and PAUL blood test to be performed preoperatively tomorrow morning.  We will continue the appropriate dressing  care by me every day.  He is arranged for surgical intervention tomorrow for exploration of the wound, irrigation, debridement of underlying structures, skin, soft tissue, synovium among other possible underlying structures and complex secondary closure over drain.  Risk and benefits of surgical intervention have been discussed at length with them and include but are not limited to risks of anesthesia, infection, bleeding, injury to adjacent structures, paralysis, paresthesias, dysfunction, deformity, further dysfunction, further deformity, scarring, recurrence, nonresolution of symptoms, possible need for further surgical interventions among others.  He understands his current clinical situation and treatment options.  He understands all of our discussions.  He wishes to proceed with surgical intervention.  Surgical intervention has been arranged for tomorrow.  He agrees with current treatment plan.  2.  Continue present care, n.p.o. after midnight, blood work in the morning preoperatively, and surgery tomorrow.       I spent 40 minutes in the professional and overall care of this patient.      Mayur Williamson MD

## 2025-03-30 NOTE — CARE PLAN
The patient's goals for the shift include      The clinical goals for the shift include see POC      Problem: Pain - Adult  Goal: Verbalizes/displays adequate comfort level or baseline comfort level  Outcome: Progressing     Problem: Safety - Adult  Goal: Free from fall injury  Outcome: Progressing     Problem: Discharge Planning  Goal: Discharge to home or other facility with appropriate resources  Outcome: Progressing     Problem: Chronic Conditions and Co-morbidities  Goal: Patient's chronic conditions and co-morbidity symptoms are monitored and maintained or improved  Outcome: Progressing     Problem: Nutrition  Goal: Nutrient intake appropriate for maintaining nutritional needs  Outcome: Progressing     Problem: Skin  Goal: Decreased wound size/increased tissue granulation at next dressing change  Outcome: Progressing  Goal: Participates in plan/prevention/treatment measures  Outcome: Progressing     Problem: Pain  Goal: Takes deep breaths with improved pain control throughout the shift  Outcome: Progressing     EOS: ambulated hallways prior to sleep. Dressing remains c/d/I to right hand. Resting comfortably at this time,call bell in reach.

## 2025-03-31 ENCOUNTER — ANESTHESIA EVENT (OUTPATIENT)
Dept: OPERATING ROOM | Facility: HOSPITAL | Age: 60
End: 2025-03-31
Payer: COMMERCIAL

## 2025-03-31 ENCOUNTER — ANESTHESIA (OUTPATIENT)
Dept: OPERATING ROOM | Facility: HOSPITAL | Age: 60
End: 2025-03-31
Payer: COMMERCIAL

## 2025-03-31 LAB
ANION GAP SERPL CALC-SCNC: 10 MMOL/L (ref 10–20)
BASOPHILS # BLD AUTO: 0.03 X10*3/UL (ref 0–0.1)
BASOPHILS NFR BLD AUTO: 0.8 %
BUN SERPL-MCNC: 16 MG/DL (ref 6–23)
CALCIUM SERPL-MCNC: 8.5 MG/DL (ref 8.6–10.3)
CHLORIDE SERPL-SCNC: 108 MMOL/L (ref 98–107)
CO2 SERPL-SCNC: 27 MMOL/L (ref 21–32)
CREAT SERPL-MCNC: 0.84 MG/DL (ref 0.5–1.3)
EGFRCR SERPLBLD CKD-EPI 2021: >90 ML/MIN/1.73M*2
EOSINOPHIL # BLD AUTO: 0.1 X10*3/UL (ref 0–0.7)
EOSINOPHIL NFR BLD AUTO: 2.6 %
ERYTHROCYTE [DISTWIDTH] IN BLOOD BY AUTOMATED COUNT: 13.3 % (ref 11.5–14.5)
GLUCOSE SERPL-MCNC: 95 MG/DL (ref 74–99)
HCT VFR BLD AUTO: 41.2 % (ref 41–52)
HGB BLD-MCNC: 13.2 G/DL (ref 13.5–17.5)
IMM GRANULOCYTES # BLD AUTO: 0.02 X10*3/UL (ref 0–0.7)
IMM GRANULOCYTES NFR BLD AUTO: 0.5 % (ref 0–0.9)
LYMPHOCYTES # BLD AUTO: 1.13 X10*3/UL (ref 1.2–4.8)
LYMPHOCYTES NFR BLD AUTO: 29.3 %
MCH RBC QN AUTO: 31 PG (ref 26–34)
MCHC RBC AUTO-ENTMCNC: 32 G/DL (ref 32–36)
MCV RBC AUTO: 97 FL (ref 80–100)
MONOCYTES # BLD AUTO: 0.44 X10*3/UL (ref 0.1–1)
MONOCYTES NFR BLD AUTO: 11.4 %
NEUTROPHILS # BLD AUTO: 2.14 X10*3/UL (ref 1.2–7.7)
NEUTROPHILS NFR BLD AUTO: 55.4 %
NRBC BLD-RTO: 0 /100 WBCS (ref 0–0)
PLATELET # BLD AUTO: 186 X10*3/UL (ref 150–450)
POTASSIUM SERPL-SCNC: 4.3 MMOL/L (ref 3.5–5.3)
RBC # BLD AUTO: 4.26 X10*6/UL (ref 4.5–5.9)
SODIUM SERPL-SCNC: 141 MMOL/L (ref 136–145)
WBC # BLD AUTO: 3.9 X10*3/UL (ref 4.4–11.3)

## 2025-03-31 PROCEDURE — 2500000004 HC RX 250 GENERAL PHARMACY W/ HCPCS (ALT 636 FOR OP/ED): Mod: JZ

## 2025-03-31 PROCEDURE — 86038 ANTINUCLEAR ANTIBODIES: CPT | Mod: STJLAB

## 2025-03-31 PROCEDURE — 3700000002 HC GENERAL ANESTHESIA TIME - EACH INCREMENTAL 1 MINUTE

## 2025-03-31 PROCEDURE — 85025 COMPLETE CBC W/AUTO DIFF WBC: CPT

## 2025-03-31 PROCEDURE — 1100000001 HC PRIVATE ROOM DAILY

## 2025-03-31 PROCEDURE — 2500000005 HC RX 250 GENERAL PHARMACY W/O HCPCS

## 2025-03-31 PROCEDURE — 2500000001 HC RX 250 WO HCPCS SELF ADMINISTERED DRUGS (ALT 637 FOR MEDICARE OP): Performed by: ANESTHESIOLOGY

## 2025-03-31 PROCEDURE — 0LB70ZZ EXCISION OF RIGHT HAND TENDON, OPEN APPROACH: ICD-10-PCS

## 2025-03-31 PROCEDURE — 2500000004 HC RX 250 GENERAL PHARMACY W/ HCPCS (ALT 636 FOR OP/ED): Performed by: INTERNAL MEDICINE

## 2025-03-31 PROCEDURE — 01N50ZZ RELEASE MEDIAN NERVE, OPEN APPROACH: ICD-10-PCS

## 2025-03-31 PROCEDURE — 80048 BASIC METABOLIC PNL TOTAL CA: CPT

## 2025-03-31 PROCEDURE — 64417 NJX AA&/STRD AX NERVE IMG: CPT | Performed by: ANESTHESIOLOGY

## 2025-03-31 PROCEDURE — 36415 COLL VENOUS BLD VENIPUNCTURE: CPT

## 2025-03-31 PROCEDURE — 84520 ASSAY OF UREA NITROGEN: CPT

## 2025-03-31 PROCEDURE — 7100000002 HC RECOVERY ROOM TIME - EACH INCREMENTAL 1 MINUTE

## 2025-03-31 PROCEDURE — A13160 PR SECD CLOS SURG WND EXTEN/COMPLIC: Performed by: ANESTHESIOLOGY

## 2025-03-31 PROCEDURE — 2720000007 HC OR 272 NO HCPCS

## 2025-03-31 PROCEDURE — 2500000001 HC RX 250 WO HCPCS SELF ADMINISTERED DRUGS (ALT 637 FOR MEDICARE OP)

## 2025-03-31 PROCEDURE — 2500000001 HC RX 250 WO HCPCS SELF ADMINISTERED DRUGS (ALT 637 FOR MEDICARE OP): Performed by: NURSE PRACTITIONER

## 2025-03-31 PROCEDURE — A13160 PR SECD CLOS SURG WND EXTEN/COMPLIC: Performed by: NURSE ANESTHETIST, CERTIFIED REGISTERED

## 2025-03-31 PROCEDURE — 2500000004 HC RX 250 GENERAL PHARMACY W/ HCPCS (ALT 636 FOR OP/ED): Performed by: ANESTHESIOLOGY

## 2025-03-31 PROCEDURE — 0LB50ZZ EXCISION OF RIGHT LOWER ARM AND WRIST TENDON, OPEN APPROACH: ICD-10-PCS

## 2025-03-31 PROCEDURE — 7100000001 HC RECOVERY ROOM TIME - INITIAL BASE CHARGE

## 2025-03-31 PROCEDURE — 0RBN0ZZ EXCISION OF RIGHT WRIST JOINT, OPEN APPROACH: ICD-10-PCS

## 2025-03-31 PROCEDURE — 3600000002 HC OR TIME - INITIAL BASE CHARGE - PROCEDURE LEVEL TWO

## 2025-03-31 PROCEDURE — 2500000004 HC RX 250 GENERAL PHARMACY W/ HCPCS (ALT 636 FOR OP/ED): Performed by: NURSE ANESTHETIST, CERTIFIED REGISTERED

## 2025-03-31 PROCEDURE — 3700000001 HC GENERAL ANESTHESIA TIME - INITIAL BASE CHARGE

## 2025-03-31 PROCEDURE — 3600000007 HC OR TIME - EACH INCREMENTAL 1 MINUTE - PROCEDURE LEVEL TWO

## 2025-03-31 RX ORDER — HYDROMORPHONE HYDROCHLORIDE 1 MG/ML
1 INJECTION, SOLUTION INTRAMUSCULAR; INTRAVENOUS; SUBCUTANEOUS EVERY 5 MIN PRN
Status: DISCONTINUED | OUTPATIENT
Start: 2025-03-31 | End: 2025-03-31 | Stop reason: HOSPADM

## 2025-03-31 RX ORDER — BACITRACIN ZINC 500 UNIT/G
OINTMENT IN PACKET (EA) TOPICAL AS NEEDED
Status: DISCONTINUED | OUTPATIENT
Start: 2025-03-31 | End: 2025-03-31 | Stop reason: HOSPADM

## 2025-03-31 RX ORDER — METOPROLOL TARTRATE 1 MG/ML
2.5 INJECTION, SOLUTION INTRAVENOUS EVERY 30 MIN PRN
Status: DISCONTINUED | OUTPATIENT
Start: 2025-03-31 | End: 2025-03-31 | Stop reason: HOSPADM

## 2025-03-31 RX ORDER — HYDRALAZINE HYDROCHLORIDE 20 MG/ML
10 INJECTION INTRAMUSCULAR; INTRAVENOUS EVERY 30 MIN PRN
Status: DISCONTINUED | OUTPATIENT
Start: 2025-03-31 | End: 2025-03-31 | Stop reason: HOSPADM

## 2025-03-31 RX ORDER — OXYCODONE HYDROCHLORIDE 10 MG/1
10 TABLET ORAL EVERY 4 HOURS PRN
Status: DISCONTINUED | OUTPATIENT
Start: 2025-03-31 | End: 2025-03-31 | Stop reason: HOSPADM

## 2025-03-31 RX ORDER — ACETAMINOPHEN 325 MG/1
975 TABLET ORAL ONCE
Status: DISCONTINUED | OUTPATIENT
Start: 2025-03-31 | End: 2025-03-31 | Stop reason: HOSPADM

## 2025-03-31 RX ORDER — MORPHINE SULFATE 4 MG/ML
4 INJECTION, SOLUTION INTRAMUSCULAR; INTRAVENOUS ONCE
Status: COMPLETED | OUTPATIENT
Start: 2025-03-31 | End: 2025-03-31

## 2025-03-31 RX ORDER — HYDROMORPHONE HYDROCHLORIDE 1 MG/ML
INJECTION, SOLUTION INTRAMUSCULAR; INTRAVENOUS; SUBCUTANEOUS AS NEEDED
Status: DISCONTINUED | OUTPATIENT
Start: 2025-03-31 | End: 2025-03-31

## 2025-03-31 RX ORDER — MIDAZOLAM HYDROCHLORIDE 1 MG/ML
1 INJECTION, SOLUTION INTRAMUSCULAR; INTRAVENOUS ONCE AS NEEDED
Status: DISCONTINUED | OUTPATIENT
Start: 2025-03-31 | End: 2025-03-31 | Stop reason: HOSPADM

## 2025-03-31 RX ORDER — HYDROMORPHONE HYDROCHLORIDE 0.2 MG/ML
0.1 INJECTION INTRAMUSCULAR; INTRAVENOUS; SUBCUTANEOUS EVERY 5 MIN PRN
Status: DISCONTINUED | OUTPATIENT
Start: 2025-03-31 | End: 2025-03-31 | Stop reason: HOSPADM

## 2025-03-31 RX ORDER — ONDANSETRON HYDROCHLORIDE 2 MG/ML
4 INJECTION, SOLUTION INTRAVENOUS ONCE AS NEEDED
Status: COMPLETED | OUTPATIENT
Start: 2025-03-31 | End: 2025-03-31

## 2025-03-31 RX ORDER — ACETAMINOPHEN 325 MG/1
650 TABLET ORAL EVERY 4 HOURS PRN
Status: DISCONTINUED | OUTPATIENT
Start: 2025-03-31 | End: 2025-03-31 | Stop reason: HOSPADM

## 2025-03-31 RX ORDER — MIDAZOLAM HYDROCHLORIDE 1 MG/ML
INJECTION, SOLUTION INTRAMUSCULAR; INTRAVENOUS AS NEEDED
Status: DISCONTINUED | OUTPATIENT
Start: 2025-03-31 | End: 2025-03-31

## 2025-03-31 RX ORDER — ALBUTEROL SULFATE 0.83 MG/ML
2.5 SOLUTION RESPIRATORY (INHALATION) ONCE AS NEEDED
Status: DISCONTINUED | OUTPATIENT
Start: 2025-03-31 | End: 2025-03-31 | Stop reason: HOSPADM

## 2025-03-31 RX ORDER — ONDANSETRON HYDROCHLORIDE 2 MG/ML
INJECTION, SOLUTION INTRAVENOUS AS NEEDED
Status: DISCONTINUED | OUTPATIENT
Start: 2025-03-31 | End: 2025-03-31

## 2025-03-31 RX ORDER — SODIUM CHLORIDE, SODIUM LACTATE, POTASSIUM CHLORIDE, CALCIUM CHLORIDE 600; 310; 30; 20 MG/100ML; MG/100ML; MG/100ML; MG/100ML
125 INJECTION, SOLUTION INTRAVENOUS CONTINUOUS
Status: ACTIVE | OUTPATIENT
Start: 2025-03-31 | End: 2025-03-31

## 2025-03-31 RX ORDER — SODIUM CHLORIDE, SODIUM LACTATE, POTASSIUM CHLORIDE, CALCIUM CHLORIDE 600; 310; 30; 20 MG/100ML; MG/100ML; MG/100ML; MG/100ML
INJECTION, SOLUTION INTRAVENOUS CONTINUOUS PRN
Status: DISCONTINUED | OUTPATIENT
Start: 2025-03-31 | End: 2025-03-31

## 2025-03-31 RX ORDER — LABETALOL HYDROCHLORIDE 5 MG/ML
INJECTION, SOLUTION INTRAVENOUS AS NEEDED
Status: DISCONTINUED | OUTPATIENT
Start: 2025-03-31 | End: 2025-03-31

## 2025-03-31 RX ORDER — PROPOFOL 10 MG/ML
INJECTION, EMULSION INTRAVENOUS AS NEEDED
Status: DISCONTINUED | OUTPATIENT
Start: 2025-03-31 | End: 2025-03-31

## 2025-03-31 RX ORDER — OXYCODONE AND ACETAMINOPHEN 5; 325 MG/1; MG/1
1 TABLET ORAL EVERY 4 HOURS PRN
Status: DISCONTINUED | OUTPATIENT
Start: 2025-03-31 | End: 2025-03-31 | Stop reason: HOSPADM

## 2025-03-31 RX ORDER — HYDROMORPHONE HYDROCHLORIDE 1 MG/ML
1 INJECTION, SOLUTION INTRAMUSCULAR; INTRAVENOUS; SUBCUTANEOUS ONCE
Status: COMPLETED | OUTPATIENT
Start: 2025-03-31 | End: 2025-03-31

## 2025-03-31 RX ORDER — FAMOTIDINE 10 MG/ML
INJECTION, SOLUTION INTRAVENOUS AS NEEDED
Status: DISCONTINUED | OUTPATIENT
Start: 2025-03-31 | End: 2025-03-31

## 2025-03-31 RX ADMIN — OXYCODONE HYDROCHLORIDE AND ACETAMINOPHEN 1 TABLET: 5; 325 TABLET ORAL at 11:25

## 2025-03-31 RX ADMIN — HYDROMORPHONE HYDROCHLORIDE 0.5 MG: 1 INJECTION, SOLUTION INTRAMUSCULAR; INTRAVENOUS; SUBCUTANEOUS at 17:30

## 2025-03-31 RX ADMIN — ATORVASTATIN CALCIUM 20 MG: 20 TABLET, FILM COATED ORAL at 22:11

## 2025-03-31 RX ADMIN — HYDROMORPHONE HYDROCHLORIDE 1 MG: 1 INJECTION, SOLUTION INTRAMUSCULAR; INTRAVENOUS; SUBCUTANEOUS at 19:33

## 2025-03-31 RX ADMIN — PROPOFOL 200 MG: 10 INJECTION, EMULSION INTRAVENOUS at 17:13

## 2025-03-31 RX ADMIN — HYDRALAZINE HYDROCHLORIDE 10 MG: 20 INJECTION INTRAMUSCULAR; INTRAVENOUS at 19:48

## 2025-03-31 RX ADMIN — FAMOTIDINE 20 MG: 10 INJECTION, SOLUTION INTRAVENOUS at 17:25

## 2025-03-31 RX ADMIN — MORPHINE SULFATE 4 MG: 4 INJECTION, SOLUTION INTRAMUSCULAR; INTRAVENOUS at 21:39

## 2025-03-31 RX ADMIN — CYCLOBENZAPRINE 10 MG: 10 TABLET, FILM COATED ORAL at 11:28

## 2025-03-31 RX ADMIN — ONDANSETRON 4 MG: 2 INJECTION INTRAMUSCULAR; INTRAVENOUS at 19:43

## 2025-03-31 RX ADMIN — MIDAZOLAM 2 MG: 1 INJECTION INTRAMUSCULAR; INTRAVENOUS at 17:07

## 2025-03-31 RX ADMIN — LOSARTAN POTASSIUM 100 MG: 100 TABLET, FILM COATED ORAL at 21:30

## 2025-03-31 RX ADMIN — CYCLOBENZAPRINE 10 MG: 10 TABLET, FILM COATED ORAL at 22:32

## 2025-03-31 RX ADMIN — HYDROMORPHONE HYDROCHLORIDE 1 MG: 1 INJECTION, SOLUTION INTRAMUSCULAR; INTRAVENOUS; SUBCUTANEOUS at 22:33

## 2025-03-31 RX ADMIN — ONDANSETRON 4 MG: 2 INJECTION INTRAMUSCULAR; INTRAVENOUS at 18:29

## 2025-03-31 RX ADMIN — ERTAPENEM SODIUM 1 G: 1 INJECTION, POWDER, LYOPHILIZED, FOR SOLUTION INTRAMUSCULAR; INTRAVENOUS at 15:08

## 2025-03-31 RX ADMIN — DEXAMETHASONE SODIUM PHOSPHATE 8 MG: 4 INJECTION, SOLUTION INTRAMUSCULAR; INTRAVENOUS at 17:25

## 2025-03-31 RX ADMIN — OXYCODONE HYDROCHLORIDE 10 MG: 10 TABLET ORAL at 20:10

## 2025-03-31 RX ADMIN — SODIUM CHLORIDE, POTASSIUM CHLORIDE, SODIUM LACTATE AND CALCIUM CHLORIDE: 600; 310; 30; 20 INJECTION, SOLUTION INTRAVENOUS at 17:07

## 2025-03-31 RX ADMIN — HYDROMORPHONE HYDROCHLORIDE 0.5 MG: 1 INJECTION, SOLUTION INTRAMUSCULAR; INTRAVENOUS; SUBCUTANEOUS at 17:24

## 2025-03-31 RX ADMIN — HYDRALAZINE HYDROCHLORIDE 10 MG: 20 INJECTION INTRAMUSCULAR; INTRAVENOUS at 20:18

## 2025-03-31 RX ADMIN — HYDROMORPHONE HYDROCHLORIDE 1 MG: 1 INJECTION, SOLUTION INTRAMUSCULAR; INTRAVENOUS; SUBCUTANEOUS at 19:19

## 2025-03-31 RX ADMIN — LABETALOL HYDROCHLORIDE 10 MG: 5 INJECTION, SOLUTION INTRAVENOUS at 18:31

## 2025-03-31 SDOH — HEALTH STABILITY: MENTAL HEALTH: CURRENT SMOKER: 0

## 2025-03-31 ASSESSMENT — COGNITIVE AND FUNCTIONAL STATUS - GENERAL
STANDING UP FROM CHAIR USING ARMS: A LITTLE
MOVING TO AND FROM BED TO CHAIR: A LITTLE
TOILETING: A LITTLE
DAILY ACTIVITIY SCORE: 19
DRESSING REGULAR UPPER BODY CLOTHING: A LITTLE
DAILY ACTIVITIY SCORE: 24
CLIMB 3 TO 5 STEPS WITH RAILING: A LITTLE
DRESSING REGULAR LOWER BODY CLOTHING: A LITTLE
PERSONAL GROOMING: A LITTLE
HELP NEEDED FOR BATHING: A LITTLE
WALKING IN HOSPITAL ROOM: A LITTLE
MOBILITY SCORE: 20
MOBILITY SCORE: 24

## 2025-03-31 ASSESSMENT — PAIN DESCRIPTION - ORIENTATION
ORIENTATION: RIGHT

## 2025-03-31 ASSESSMENT — PAIN SCALES - GENERAL
PAINLEVEL_OUTOF10: 4
PAINLEVEL_OUTOF10: 4
PAINLEVEL_OUTOF10: 8
PAINLEVEL_OUTOF10: 0 - NO PAIN
PAINLEVEL_OUTOF10: 3
PAINLEVEL_OUTOF10: 3
PAINLEVEL_OUTOF10: 6
PAINLEVEL_OUTOF10: 8
PAIN_LEVEL: 0
PAINLEVEL_OUTOF10: 10 - WORST POSSIBLE PAIN
PAINLEVEL_OUTOF10: 8
PAINLEVEL_OUTOF10: 0 - NO PAIN
PAINLEVEL_OUTOF10: 8
PAINLEVEL_OUTOF10: 3
PAINLEVEL_OUTOF10: 4

## 2025-03-31 ASSESSMENT — PAIN DESCRIPTION - LOCATION
LOCATION: HAND
LOCATION: WRIST
LOCATION: HAND
LOCATION: HAND
LOCATION: WRIST

## 2025-03-31 NOTE — CARE PLAN
The patient's goals for the shift include      The clinical goals for the shift include see POC      Eos: ambulated in hallways with balanced,steady gait. He has npo after midnight with clear liquids until 0500 for scheduled surgery today. Dressing remains c/d/I to right hand. He was medicated with percocet at hs with relief. Call bell is in reach. Safety is maintained.

## 2025-03-31 NOTE — ANESTHESIA POSTPROCEDURE EVALUATION
Patient: Sundar Gentile    Procedure Summary       Date: 03/31/25 Room / Location: [facility] OR 05 / [facility] OR    Anesthesia Start: 1709 Anesthesia Stop: 1908    Procedure: CLOSURE, SURGICAL WOUND, UPPER EXTREMITY (Right) Diagnosis:       Wound infection after surgery      (Wound infection after surgery [T81.49XA])    Surgeons: Mayur Williamson MD Responsible Provider: Sb Rabago MD    Anesthesia Type: general, regional ASA Status: 3            Anesthesia Type: general, regional    Vitals Value Taken Time   /82 03/31/25 1908   Temp 36.0 03/31/25 1908   Pulse 62 03/31/25 1908   Resp 16 03/31/25 1908   SpO2 100 03/31/25 1908       Anesthesia Post Evaluation    Patient location during evaluation: PACU  Patient participation: complete - patient cannot participate  Level of consciousness: sedated  Pain score: 0  Pain management: adequate  Multimodal analgesia pain management approach  Airway patency: patent (OPA in place)  Cardiovascular status: hypertensive and hemodynamically stable  Respiratory status: acceptable, face mask, nonlabored ventilation, spontaneous ventilation, unassisted and oral airway  Hydration status: acceptable  Postoperative Nausea and Vomiting: none        There were no known notable events for this encounter.

## 2025-03-31 NOTE — ANESTHESIA PROCEDURE NOTES
Airway  Date/Time: 3/31/2025 5:16 PM  Urgency: elective      Staffing  Performed: CRNA   Authorized by: Sb Rabago MD    Performed by: JANAE Rodgers-MANISH  Patient location during procedure: OR    Indications and Patient Condition  Indications for airway management: anesthesia  Spontaneous Ventilation: absent  Sedation level: deep  Preoxygenated: yes  Patient position: sniffing  Mask difficulty assessment: 1 - vent by mask    Final Airway Details  Final airway type: supraglottic airway      Successful airway: Supraglottic airway: IGEL.  Size 5     Number of attempts at approach: 1

## 2025-03-31 NOTE — ANESTHESIA PROCEDURE NOTES
Peripheral Block    Patient location during procedure: OR  Start time: 3/31/2025 6:51 PM  End time: 3/31/2025 7:00 PM  Reason for block: at surgeon's request and post-op pain management  Staffing  Performed: attending   Authorized by: Sb Rabago MD    Performed by: Sb Rabago MD  Preanesthetic Checklist  Completed: patient identified, IV checked, site marked, risks and benefits discussed, surgical consent, monitors and equipment checked, pre-op evaluation and timeout performed   Timeout performed at: 3/31/2025 6:47 PM  Peripheral Block  Patient position: laying flat  Prep: ChloraPrep  Patient monitoring: heart rate, cardiac monitor and continuous pulse ox  Block type: axillary  Laterality: right  Injection technique: single-shot  Guidance: Doppler guided and nerve stimulator  Local infiltration: lidocaine  Infiltration strength: 2 %  Dose: 4 mL  Needle  Needle type: pencil-tip   Needle gauge: 21 G  Needle length: 8 cm  Needle localization: anatomical landmarks, nerve stimulator and ultrasound guidance  Needle insertion depth: 3 cm  Test dose: negative  Assessment  Injection assessment: negative aspiration for heme, no paresthesia on injection, incremental injection and local visualized surrounding nerve on ultrasound  Paresthesia pain: none  Heart rate change: no  Slow fractionated injection: yes  Additional Notes  30 ml of Bupivacaine (125 mg) with Epinephrine 1:200 000 mixed with 100 mcg of Clonidine and 10 mg of Decadron. Slow, incremental injection of the mix with consistently negative aspirations between bouts of injections. Patient tolerated procedure well without immediately observable complications.

## 2025-03-31 NOTE — PROGRESS NOTES
Spiritual Care Visit  Spiritual Care Request    Reason for Visit:  Routine Visit: Introduction     Request Received From:       Focus of Care:  Visited With: Patient         Refer to :          Spiritual Care Assessment    Spiritual Assessment:                      Care Provided:  Intended Effects: Build relationship of care and support, Demonstrate caring and concern, Establish rapport and connectedness, Preserve dignity and respect  Methods: Assist with spiritual/Hinduism practices, Offer spiritual/Hinduism support  Interventions: Active listening, Ask guided questions, Perform a blessing, Share words of hope and inspiration    Sense of Community and or Buddhist Affiliation:  Muslim Mormonism         Addressed Needs/Concerns and/or Hai Through:          Outcome:        Advance Directives:         Spiritual Care Annotation    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

## 2025-03-31 NOTE — ANESTHESIA PREPROCEDURE EVALUATION
Patient: Sundar Gentile    Procedure Information       Date/Time: 03/31/25 1640    Procedure: CLOSURE, SURGICAL WOUND, UPPER EXTREMITY (Right)    Location: [facility] / [facility]    Surgeons: Mayur Williamson MD            Relevant Problems   Cardiac   (+) Hyperlipidemia   (+) Hypertension      Neuro   (+) Narcotic dependence, in remission (Multi)   (+) Right carpal tunnel syndrome   (+) Right cervical radiculopathy   (+) Situational depression      GI   (+) Hiatal hernia      Musculoskeletal   (+) Right carpal tunnel syndrome      HEENT   (+) Sinusitis      ID   (+) Wound infection after surgery       Clinical information reviewed:   Tobacco  Allergies  Meds   Med Hx  Surg Hx   Fam Hx  Soc Hx        NPO Detail:  No data recorded     Physical Exam    Airway  Mallampati: I  TM distance: >3 FB  Neck ROM: full     Cardiovascular   Rhythm: regular  Rate: abnormal     Dental - normal exam     Pulmonary   Breath sounds clear to auscultation  (+) decreased breath sounds     Abdominal   (+) obese  Abdomen: soft  Bowel sounds: normal           Anesthesia Plan    History of general anesthesia?: yes  History of complications of general anesthesia?: no    ASA 3     general and regional   (Patient requested post-op Axillary Brachial Plexus Block (while still under GA))  The patient is not a current smoker.  Patient was previously instructed to abstain from smoking on day of procedure.  Patient did not smoke on day of procedure.  Education provided regarding risk of obstructive sleep apnea.  intravenous induction   Postoperative administration of opioids is intended.  Anesthetic plan and risks discussed with patient and spouse.  Use of blood products discussed with patient and spouse who consented to blood products.    Plan discussed with CAA.

## 2025-03-31 NOTE — CARE PLAN
The patient's goals for the shift include      The clinical goals for the shift include see care plan      Problem: Pain - Adult  Goal: Verbalizes/displays adequate comfort level or baseline comfort level  Recent Flowsheet Documentation  Taken 3/31/2025 0750 by All Batres RN  Verbalizes/displays adequate comfort level or baseline comfort level: Encourage patient to monitor pain and request assistance  Goal: Verbalizes/displays adequate comfort level or baseline comfort level  Flowsheets (Taken 3/31/2025 0750)  Verbalizes/displays adequate comfort level or baseline comfort level: Encourage patient to monitor pain and request assistance     Problem: Safety - Adult  Goal: Free from fall injury  Recent Flowsheet Documentation  Taken 3/31/2025 0750 by All Batres RN  Free from fall injury: Instruct family/caregiver on patient safety     Problem: Discharge Planning  Goal: Discharge to home or other facility with appropriate resources  Recent Flowsheet Documentation  Taken 3/31/2025 0750 by All Batres RN  Discharge to home or other facility with appropriate resources: Identify barriers to discharge with patient and caregiver     Problem: Chronic Conditions and Co-morbidities  Goal: Patient's chronic conditions and co-morbidity symptoms are monitored and maintained or improved  Recent Flowsheet Documentation  Taken 3/31/2025 0750 by All Batres RN  Care Plan - Patient's Chronic Conditions and Co-Morbidity Symptoms are Monitored and Maintained or Improved: Monitor and assess patient's chronic conditions and comorbid symptoms for stability, deterioration, or improvement     Problem: Skin  Goal: Decreased wound size/increased tissue granulation at next dressing change  Recent Flowsheet Documentation  Taken 3/31/2025 0750 by All Batres RN  Decreased wound size/increased tissue granulation at next dressing change: Promote sleep for wound healing     Problem: Safety - Adult  Goal: Free from fall injury  Flowsheets (Taken  3/31/2025 0750)  Free from fall injury: Instruct family/caregiver on patient safety     Problem: Discharge Planning  Goal: Discharge to home or other facility with appropriate resources  Flowsheets (Taken 3/31/2025 0750)  Discharge to home or other facility with appropriate resources: Identify barriers to discharge with patient and caregiver     Problem: Chronic Conditions and Co-morbidities  Goal: Patient's chronic conditions and co-morbidity symptoms are monitored and maintained or improved  Flowsheets (Taken 3/31/2025 0750)  Care Plan - Patient's Chronic Conditions and Co-Morbidity Symptoms are Monitored and Maintained or Improved: Monitor and assess patient's chronic conditions and comorbid symptoms for stability, deterioration, or improvement     Problem: Skin  Goal: Decreased wound size/increased tissue granulation at next dressing change  Flowsheets (Taken 3/31/2025 0750)  Decreased wound size/increased tissue granulation at next dressing change: Promote sleep for wound healing

## 2025-03-31 NOTE — OP NOTE
CLOSURE, SURGICAL WOUND, UPPER EXTREMITY (R) Operative Note     Date: 3/31/2025  OR Location: [facility]    Name: Sundar Gentile, : 1965, Age: 59 y.o., MRN: [id number], Sex: male    Diagnosis  Pre-op Diagnosis      * Wound infection after surgery [T81.49XA] Post-op Diagnosis     * Wound infection after surgery [T81.49XA]     Procedures  CLOSURE, SURGICAL WOUND, UPPER EXTREMITY  82182 - CO SECONDARY CLOSURE SURG WOUND/DEHSN XTNSV/COMP      Surgeons   Dr. Mayur Williamson MD    Resident/Fellow/Other Assistant:  Surgeons and Role:     * All Wilhelm PA-C - Assisting    Staff:   Surgical Assistant:   Circulator: Sander  Scrub Person: Loc Shresthaub Person: Jerrod Guerra Circulator: Kalli    Anesthesia Staff: Anesthesiologist: Sb Rabago MD  CRNA: JANAE Rodgers-CRNA    Procedure Summary  Anesthesia: Regional, General  ASA: III  Estimated Blood Loss: Less than 10 mL  Intra-op Medications:   Administrations occurring from 1640 to 1825 on 25:   Medication Name Total Dose   dexAMETHasone (Decadron) injection 4 mg/mL 8 mg   famotidine PF (Pepcid) injection 20 mg   HYDROmorphone (Dilaudid) injection 1 mg/mL 1 mg   LR infusion Cannot be calculated   midazolam (Versed) injection 1 mg/mL 2 mg   propofol (Diprivan) injection 10 mg/mL 200 mg              Anesthesia Record               Intraprocedure I/O Totals          Intake    LR infusion 500.00 mL    Total Intake 500 mL          Specimen:   ID Type Source Tests Collected by Time   1 : DEBRIDEMENT OF TISSUE AND SUTURE OF RIGHT WRIST Tissue SOFT TISSUE RESECTION SURGICAL PATHOLOGY EXAM Mayur Williamson MD 3/31/2025 1759   2 : RIGHT FLEXOR SYNOVIUM Tissue SYNOVIUM SURGICAL PATHOLOGY EXAM Mayur Williamson MD 3/31/2025 1800                   Indications: Sundar Gentile is an 59 y.o. male who is having surgery for Wound infection after surgery [T81.49XA].  He is identified preoperatively with his wife.  Risk and benefits surgical invention once again  reviewed at length with them and include but are not limited to: Risks of anesthesia, infection, bleeding, injury to adjacent structures, paralysis, paresthesias, dysfunction, deformity, further dysfunction, further deformity, scarring, recurrence, nonresolution of symptoms, possible need for further surgical interventions among others.  We have discussed the preoperative course, the operative procedure, the postoperative course at length as well.  We have discussed his current clinical situation and treatment options at length.  He had previous repeat right carpal tunnel release with hypothenar fat pad flap closure on 2/21/2025 and subsequently developed wound infection on 3/9/2025 with Enterobacter cloacae.  He was treated with wound care and oral Cipro and Bactrim DS antibiotics and did not demonstrate significant improvement.  He has been admitted to the hospital and placed on IV antibiotics with consultation to infectious disease Dr. Pereira and we have planned exploration of the right volar palm/wrist wound with irrigation, debridement of skin and soft tissue, possible flexor synovectomy, and complex wound closure over a Penrose drain.  He understands all of our discussions.  He understands his current clinical situation and treatment options.  He wished to proceed with surgical intervention.  The consent is obtained.  The right volar palm/wrist wound is identified and marked preoperatively with the marking pen.  He is receiving IV antibiotics.  SCDs are in place.  The preoperative safety checklist was performed.  He was then taken the operating room placed in supine position on the operating table.    The patient was seen in the preoperative area. The risks, benefits, complications, treatment options, non-operative alternatives, expected recovery and outcomes were discussed with the patient. The possibilities of reaction to medication, pulmonary aspiration, injury to surrounding structures, bleeding, recurrent  infection, the need for additional procedures, failure to diagnose a condition, and creating a complication requiring transfusion or operation were discussed with the patient. The patient concurred with the proposed plan, giving informed consent.  The site of surgery was properly noted/marked if necessary per policy. The patient has been actively warmed in preoperative area. Preoperative antibiotics have been ordered and given within 1 hours of incision. Venous thrombosis prophylaxis have been ordered including bilateral sequential compression devices    Procedure Details:    Preoperative diagnosis: Right volar palm/wrist postsurgical wound infection/dehiscence    Postoperative diagnosis: Right volar palm/wrist postsurgical wound infection/dehiscence with proliferative synovium of the flexor tendons    Operative procedure: Exploration volar right palm/wrist wound with excisional debridement of devitalized skin and subcutaneous tissue, removal 6 Ethibond sutures, radical flexor synovectomy of the FPL tendon to the thumb and the FDS and FDP flexor tendons to the index, middle, ring, small fingers at the wrist level, neurolysis of the median nerve, complex secondary closure of the wound over 1/4 inch Penrose drain.    Attending Surgeon: Dr. Mayur Williamson MD    Assistant: All Perez    Anesthesia: General    Estimated blood loss: Less than 10 cc    Condition: Stable    Complications: None    Specimen: Sent to pathology for further evaluation    Drain: Quarter inch Penrose drain in place in the wound and sutured to itself    Details of the procedure:  He is identified preoperatively with his wife.  Risk and benefits surgical invention once again reviewed at length with them and include but are not limited to: Risks of anesthesia, infection, bleeding, injury to adjacent structures, paralysis, paresthesias, dysfunction, deformity, further dysfunction, further deformity, scarring, recurrence, nonresolution of  symptoms, possible need for further surgical interventions among others.  We have discussed the preoperative course, the operative procedure, the postoperative course at length as well.  We have discussed his current clinical situation and treatment options at length.  He had previous repeat right carpal tunnel release with hypothenar fat pad flap closure on 2/21/2025 and subsequently developed wound infection on 3/9/2025 with Enterobacter cloacae.  He was treated with wound care and oral Cipro and Bactrim DS antibiotics and did not demonstrate significant improvement.  He has been admitted to the hospital and placed on IV antibiotics with consultation to infectious disease Dr. Pereira and we have planned exploration of the right volar palm/wrist wound with irrigation, debridement of skin and soft tissue, possible flexor synovectomy, and complex wound closure over a Penrose drain.  He understands all of our discussions.  He understands his current clinical situation and treatment options.  He wished to proceed with surgical intervention.  The consent is obtained.  The right volar palm/wrist wound is identified and marked preoperatively with the marking pen.  He is receiving IV antibiotics.  SCDs are in place.  The preoperative safety checklist was performed.  He was then taken the operating room placed in supine position on the operating table.  After adequate induction of general anesthesia the timeout procedure was performed.  The entire procedure performed under loupe magnification.  The right volar palm and wrist area is identified.  The dressing is removed and a separate set of pickups and scissors were employed to remove the 3 remaining nylon sutures.  These instruments were passed off table as contaminated.  The right upper extremity was then cleaned prepped and draped in the usual sterile fashion and a proximal upper extremity tourniquet was applied.  The timeout procedure was performed again.  The right hand  is repositioned on the hand table and retracted with aluminum hand retracting system.  The wound is further identified and marked with a marking pen for extension in both proximal and distal directions utilizing the previous scar.  The right upper extremity is elevated and the proximal upper extremity tourniquet was inflated.  The Esmarch was not employed to exsanguinate the right upper extremity given the history of Enterobacter cloacae infection.  The right hand was then repositioned on the hand table and retracted with the aluminum hand retracting system.  The appropriate incisions were then made with a scalpel.  Subcutaneous tissues dissected with tenotomy scissors.  Excellent hemostasis is maintained with electrocautery.  Excisional debridement of devitalized skin and subcutaneous tissue from the wound edges is performed under direct vision with loupe magnification employing the scalpel and sharp straight scissors.  Further exploration demonstrated the hypothenar fat pad flap to be detached from the radial wall of the carpal tunnel.  The 6 Ethibond sutures were identified dissected and excised and passed off the table.  The median nerve is further identified and demonstrated to be intact.  It was briefly, gently, meticulously dissected along its lateral margins to allow appropriate mobilization.  The underlying FPL tendon to the thumb and FDS and FDP flexor tendons to the index, middle, ring, and small fingers demonstrated significant thickened synovium encasing these tendons.  This is carefully gently meticulously dissected from these tendons and radical flexor synovectomy of these tendons is performed at the wrist level for all 9 flexor tendons.  There is no abscess identified.  There are no other significant findings at this time.  Copious irrigation with over 3 L of saline solution is performed.  The proximal upper extremity tourniquet was then released.  Total tourniquet time was 45 minutes.  The entire  hand and all 5 digits became pink, warm, capillary refill 1 to 2 seconds.  Excellent hemostasis is maintained with electrocautery.  Further copious irrigation with saline solution is performed.  Excellent hemostasis is demonstrated.  The fat pad flap does cover the median nerve but is not secured to the radial carpal tunnel while.  Given the current infection and history of suture reactivity no internal sutures are placed at this time.  The wound edges were then slightly undermined in proximal and distal directions.  Complex secondary closure of the wound/operative site was then performed over 1/4 inch Penrose drain with several horizontal mattress and simple erupted sutures of 3-0 nylon and a few simple erupted sutures of 4-0 nylon.  The proximal and distal ends of the quarter-inch Penrose drain exited the proximal and distal aspects of the wound.  The proximal and distal ends of the Penrose drain that exited the wound were then sutured to each other with 1 horizontal mattress 4-0 nylon suture to secure the Penrose drain.  The right hand and wrist were further cleaned and dried.  Bacitracin, Xeroform, and the appropriate bulky protective immobilizing sterile dressing and plaster splint were then fashioned and applied.  Distal tips of all 5 digits remained pink, warm, capillary refill 1 to 2 seconds after dressing and splint application.  The anesthesiologist then performed axillary block anesthetic to the right upper extremity without difficulty.  The patient awoke from anesthesia without difficulty and is transferred to recovery room in stable condition.    Dr. Mayur Williamson MD    Complications:  None; patient tolerated the procedure well.    Disposition: PACU - hemodynamically stable.  Condition: stable         Drains and/or Catheters:   Open Drain Right  (Active)   Quadrant Penrose drain placed in the wound and exiting the most proximal and distal aspects of the wound closure with the proximal and distal ends  sutured together with 1 horizontal mattress suture of 4-0 nylon    Tourniquet Times:     Total Tourniquet Time Documented:  Arm - Upper (Right) - 45 minutes  Total: Arm - Upper (Right) - 45 minutes      Findings: Right volar palm/wrist wound with skin and soft tissue debridement, removal of 6 Ethibond sutures, radical flexor synovectomy of the FPL of the thumb, and FDS and FDP flexor tendons to the index, middle, ring, small fingers.    Attending Attestation: I was present and scrubbed for the entire procedure.    Mayur Williamson  Phone Number: [phone]

## 2025-03-31 NOTE — PROGRESS NOTES
03/31/25 1157   Discharge Planning   Support Systems Family members;Spouse/significant other   Assistance Needed left wrist fused, open wound on right wrist   Expected Discharge Disposition Home H   Patient Choice   Provider Choice list and CMS website (https://medicare.gov/care-compare#search) for post-acute Quality and Resource Measure Data were provided and reviewed with: Patient   Stroke Family Assessment   Stroke Family Assessment Needed No   Intensity of Service   Intensity of Service 0-30 min     Introduced myself to patient and my role.  Discussed home IVAB therapy. States his wife is unable to assist due to her medical condition.  He is unable to administer  due to fused left wrist and open wound on right wrist.  IV ertapenem 1 gm q 24 hours 2-3 weeks. Plan is either his son that is a paramedic arrange his schedule to help or out patient. States he is here until Friday and will let us know about his son. Will use [facility] if son agrees.   [address] Called [facility]  specialty Clinic and infusion, [phone]. Message left to return my call.  Patient notified. States if out patient can not be arranged his cousin, Deja Fox , [id number] [facility] [phone], phone  can manage the IV.

## 2025-04-01 ENCOUNTER — HOME HEALTH ADMISSION (OUTPATIENT)
Dept: HOME HEALTH SERVICES | Facility: HOME HEALTH | Age: 60
End: 2025-04-01
Payer: COMMERCIAL

## 2025-04-01 LAB — ANA SER QL HEP2 SUBST: NEGATIVE

## 2025-04-01 PROCEDURE — 2500000001 HC RX 250 WO HCPCS SELF ADMINISTERED DRUGS (ALT 637 FOR MEDICARE OP)

## 2025-04-01 PROCEDURE — 2500000004 HC RX 250 GENERAL PHARMACY W/ HCPCS (ALT 636 FOR OP/ED)

## 2025-04-01 PROCEDURE — 1100000001 HC PRIVATE ROOM DAILY

## 2025-04-01 RX ORDER — IBUPROFEN 600 MG/1
600 TABLET ORAL EVERY 6 HOURS PRN
Status: DISCONTINUED | OUTPATIENT
Start: 2025-04-01 | End: 2025-04-04 | Stop reason: HOSPADM

## 2025-04-01 RX ORDER — ERTAPENEM 1 G/1
1 INJECTION, POWDER, LYOPHILIZED, FOR SOLUTION INTRAMUSCULAR; INTRAVENOUS DAILY
Qty: 20 G | Refills: 0 | Status: SHIPPED | OUTPATIENT
Start: 2025-04-01 | End: 2025-04-21

## 2025-04-01 RX ORDER — MORPHINE SULFATE 4 MG/ML
4 INJECTION, SOLUTION INTRAMUSCULAR; INTRAVENOUS EVERY 4 HOURS PRN
Status: DISCONTINUED | OUTPATIENT
Start: 2025-04-01 | End: 2025-04-04 | Stop reason: HOSPADM

## 2025-04-01 RX ORDER — MORPHINE SULFATE 4 MG/ML
4 INJECTION, SOLUTION INTRAMUSCULAR; INTRAVENOUS ONCE
Status: COMPLETED | OUTPATIENT
Start: 2025-04-01 | End: 2025-04-01

## 2025-04-01 RX ORDER — OXYCODONE AND ACETAMINOPHEN 5; 325 MG/1; MG/1
1 TABLET ORAL EVERY 6 HOURS PRN
Status: DISCONTINUED | OUTPATIENT
Start: 2025-04-01 | End: 2025-04-04 | Stop reason: HOSPADM

## 2025-04-01 RX ADMIN — ERTAPENEM SODIUM 1 G: 1 INJECTION, POWDER, LYOPHILIZED, FOR SOLUTION INTRAMUSCULAR; INTRAVENOUS at 15:09

## 2025-04-01 RX ADMIN — OXYCODONE HYDROCHLORIDE AND ACETAMINOPHEN 1 TABLET: 5; 325 TABLET ORAL at 10:32

## 2025-04-01 RX ADMIN — MORPHINE SULFATE 4 MG: 4 INJECTION, SOLUTION INTRAMUSCULAR; INTRAVENOUS at 20:44

## 2025-04-01 RX ADMIN — ATORVASTATIN CALCIUM 20 MG: 20 TABLET, FILM COATED ORAL at 20:28

## 2025-04-01 RX ADMIN — OXYCODONE HYDROCHLORIDE AND ACETAMINOPHEN 1 TABLET: 5; 325 TABLET ORAL at 23:28

## 2025-04-01 RX ADMIN — MORPHINE SULFATE 4 MG: 4 INJECTION, SOLUTION INTRAMUSCULAR; INTRAVENOUS at 17:51

## 2025-04-01 RX ADMIN — CYCLOBENZAPRINE 10 MG: 10 TABLET, FILM COATED ORAL at 10:32

## 2025-04-01 RX ADMIN — CYCLOBENZAPRINE 10 MG: 10 TABLET, FILM COATED ORAL at 16:38

## 2025-04-01 RX ADMIN — LOSARTAN POTASSIUM 100 MG: 100 TABLET, FILM COATED ORAL at 20:28

## 2025-04-01 RX ADMIN — CYCLOBENZAPRINE 10 MG: 10 TABLET, FILM COATED ORAL at 23:32

## 2025-04-01 RX ADMIN — OXYCODONE HYDROCHLORIDE AND ACETAMINOPHEN 1 TABLET: 5; 325 TABLET ORAL at 00:03

## 2025-04-01 RX ADMIN — MORPHINE SULFATE 4 MG: 4 INJECTION, SOLUTION INTRAMUSCULAR; INTRAVENOUS at 13:46

## 2025-04-01 RX ADMIN — MORPHINE SULFATE 4 MG: 4 INJECTION, SOLUTION INTRAMUSCULAR; INTRAVENOUS at 04:15

## 2025-04-01 RX ADMIN — OXYCODONE HYDROCHLORIDE AND ACETAMINOPHEN 1 TABLET: 5; 325 TABLET ORAL at 16:38

## 2025-04-01 ASSESSMENT — PAIN - FUNCTIONAL ASSESSMENT
PAIN_FUNCTIONAL_ASSESSMENT: 0-10

## 2025-04-01 ASSESSMENT — PAIN SCALES - GENERAL
PAINLEVEL_OUTOF10: 3
PAINLEVEL_OUTOF10: 2
PAINLEVEL_OUTOF10: 7
PAINLEVEL_OUTOF10: 10 - WORST POSSIBLE PAIN
PAINLEVEL_OUTOF10: 8
PAINLEVEL_OUTOF10: 4
PAINLEVEL_OUTOF10: 6
PAINLEVEL_OUTOF10: 9
PAINLEVEL_OUTOF10: 7
PAINLEVEL_OUTOF10: 8
PAINLEVEL_OUTOF10: 7
PAINLEVEL_OUTOF10: 0 - NO PAIN
PAINLEVEL_OUTOF10: 7
PAINLEVEL_OUTOF10: 3
PAINLEVEL_OUTOF10: 7

## 2025-04-01 ASSESSMENT — COGNITIVE AND FUNCTIONAL STATUS - GENERAL
DAILY ACTIVITIY SCORE: 19
PERSONAL GROOMING: A LITTLE
WALKING IN HOSPITAL ROOM: A LITTLE
TOILETING: A LITTLE
CLIMB 3 TO 5 STEPS WITH RAILING: A LITTLE
DRESSING REGULAR LOWER BODY CLOTHING: A LITTLE
MOVING TO AND FROM BED TO CHAIR: A LITTLE
DRESSING REGULAR UPPER BODY CLOTHING: A LITTLE
STANDING UP FROM CHAIR USING ARMS: A LITTLE
MOBILITY SCORE: 20
HELP NEEDED FOR BATHING: A LITTLE

## 2025-04-01 ASSESSMENT — PAIN DESCRIPTION - LOCATION
LOCATION: HAND
LOCATION: WRIST

## 2025-04-01 ASSESSMENT — PAIN DESCRIPTION - ORIENTATION
ORIENTATION: RIGHT
ORIENTATION: RIGHT

## 2025-04-01 ASSESSMENT — PAIN SCALES - PAIN ASSESSMENT IN ADVANCED DEMENTIA (PAINAD): TOTALSCORE: MEDICATION (SEE MAR)

## 2025-04-01 NOTE — CARE PLAN
The patient's goals for the shift include      The clinical goals for the shift include see care plan      Problem: Skin  Goal: Decreased wound size/increased tissue granulation at next dressing change  Recent Flowsheet Documentation  Taken 4/1/2025 0747 by All Batres RN  Decreased wound size/increased tissue granulation at next dressing change: Promote sleep for wound healing  Goal: Participates in plan/prevention/treatment measures  Recent Flowsheet Documentation  Taken 4/1/2025 0747 by All Batres RN  Participates in plan/prevention/treatment measures: Increase activity/out of bed for meals     Problem: Skin  Goal: Decreased wound size/increased tissue granulation at next dressing change  Flowsheets (Taken 4/1/2025 0747)  Decreased wound size/increased tissue granulation at next dressing change: Promote sleep for wound healing  Goal: Participates in plan/prevention/treatment measures  Flowsheets (Taken 4/1/2025 0747)  Participates in plan/prevention/treatment measures: Increase activity/out of bed for meals

## 2025-04-01 NOTE — CARE PLAN
The patient's goals for the shift include      The clinical goals for the shift include see care plan      Problem: Pain - Adult  Goal: Verbalizes/displays adequate comfort level or baseline comfort level  Outcome: Progressing     Problem: Fall/Injury  Goal: Not fall by end of shift  Outcome: Progressing     Problem: Chronic Conditions and Co-morbidities  Goal: Patient's chronic conditions and co-morbidity symptoms are monitored and maintained or improved  Outcome: Progressing     Problem: Pain  Goal: Takes deep breaths with improved pain control throughout the shift  Outcome: Progressing

## 2025-04-01 NOTE — PROGRESS NOTES
04/01/25 0902   Discharge Planning   Home or Post Acute Services In home services   Type of Home Care Services Home nursing visits   Expected Discharge Disposition Home H  ([facility] infusion)   Patient Choice   Patient / Family choosing to utilize agency / facility established prior to hospitalization Yes   Intensity of Service   Intensity of Service >30 min     Per ID note from 3/28 Start ertapenem 1 g IV every 24 hours for 2 to 3 weeks. Met with patient at bedside. He confirms that [facility] is AOC for home infusion and he confirms that his cousin will be his teachable caregiver. Patient also confirms understanding that [facility] will contact him once insurance verification is complete and that he will need a confirmed SOC prior to discharging. Will need internal referral for home infusion.     1503- [facility] infusion referral has been placed. Per MD, plan for d/c either Thursday or Friday. Notified [facility] intake nurse.

## 2025-04-01 NOTE — PROGRESS NOTES
"Sundar Gentile is a 59 y.o. male on day 4 of admission presenting with Wound infection after surgery.    Subjective   He is Postop day 1.  His wife is present throughout this visit.  He states that he is having significant pain and the right upper extremity axillary block was not effective for him.  He states the pain is better controlled with morphine and then Dilaudid.  He states he is tolerating his diet.  He denies any diarrhea at this time.  He states that he has burning pain in his right hand.  He denies any other problems or complaints at this time.  He has maintained the dressing and splint as instructed.       Objective     On physical examination he is alert and oriented x 3, pleasant and cooperative, in no acute distress.    The right hand is examined.  The dressing and splint are removed.  The wound is cleaned and dried.  The distal tips of all 5 digits are pink, warm, capillary refill of 1 to 2 seconds.  The distal tips of all 5 digits are intact to sensation of touch.  He demonstrates full passive range of motion of the wrist and all 5 digits.  He states that he is having trouble feeling and moving his fingers.  The operative site is clean, dry, and intact.  The Penrose drain is intact.  There is some minimal edema.  There is some minimal serosanguineous drainage.  There is no erythema.  There is no other drainage or discharge.  There is minimal/mild tenderness to palpation.  The remainder the exam is within normal limits for him.    Last Recorded Vitals  Blood pressure 142/79, pulse 66, temperature 37.6 °C (99.7 °F), temperature source Temporal, resp. rate 16, height 1.803 m (5' 10.98\"), weight 109 kg (240 lb 4.8 oz), SpO2 97%.  Intake/Output last 3 Shifts:  I/O last 3 completed shifts:  In: 975 (8.9 mL/kg) [I.V.:975 (8.9 mL/kg)]  Out: - (0 mL/kg)   Weight: 109 kg     Relevant Results    Scheduled medications  atorvastatin, 20 mg, oral, Nightly  ertapenem, 1 g, intravenous, q24h  losartan, 100 mg, " oral, Nightly      Continuous medications     PRN medications  PRN medications: alteplase, cyclobenzaprine, morphine, oxyCODONE-acetaminophen    Labs  No results found for this or any previous visit (from the past 24 hours).     Imaging  Imaging  CT head wo IV contrast    Result Date: 3/29/2025  No evidence of acute intracranial hemorrhage or calvarial fracture.       MACRO: None     Signed by: Forrest Frost 3/29/2025 7:45 PM Dictation workstation:   [id number]     Cardiology, Vascular, and Other Imaging  Bedside PICC Imaging    Result Date: 3/28/2025  These images are not reportable by radiology and will not be interpreted by  Radiologists.          Assessment/Plan   Assessment & Plan  Wound infection after surgery    Postop day #1 status post exploration, irrigation, excisional debridement of skin and soft tissue, radical flexor synovectomy of flexor tendons at the wrist level and complex secondary closure over Penrose drain of the right hand volar palm/wrist wound.  1.  Dressing change performed today by me.  He is progressing well with healing.  Continue wound care by me.  Continue dressing and splint and elevation at this time.  2.  Continue IV antibiotics as directed by Dr. Pereira, infectious disease physician  3.  Pain control.  I will add IV morphine for severe pain as needed and he may continue Percocet for mild pain as needed.  4.  Continue present care as directed.  If there are any questions, issues, or concerns please do not hesitate to contact me.       I spent 35 minutes in the professional and overall care of this patient.      Mayur Williamson MD

## 2025-04-02 PROCEDURE — 2500000001 HC RX 250 WO HCPCS SELF ADMINISTERED DRUGS (ALT 637 FOR MEDICARE OP)

## 2025-04-02 PROCEDURE — 2500000004 HC RX 250 GENERAL PHARMACY W/ HCPCS (ALT 636 FOR OP/ED)

## 2025-04-02 PROCEDURE — 1100000001 HC PRIVATE ROOM DAILY

## 2025-04-02 RX ADMIN — MORPHINE SULFATE 4 MG: 4 INJECTION, SOLUTION INTRAMUSCULAR; INTRAVENOUS at 14:52

## 2025-04-02 RX ADMIN — OXYCODONE HYDROCHLORIDE AND ACETAMINOPHEN 1 TABLET: 5; 325 TABLET ORAL at 09:20

## 2025-04-02 RX ADMIN — IBUPROFEN 600 MG: 600 TABLET, FILM COATED ORAL at 07:54

## 2025-04-02 RX ADMIN — LOSARTAN POTASSIUM 100 MG: 100 TABLET, FILM COATED ORAL at 20:05

## 2025-04-02 RX ADMIN — ATORVASTATIN CALCIUM 20 MG: 20 TABLET, FILM COATED ORAL at 20:05

## 2025-04-02 RX ADMIN — MORPHINE SULFATE 4 MG: 4 INJECTION, SOLUTION INTRAMUSCULAR; INTRAVENOUS at 20:34

## 2025-04-02 RX ADMIN — OXYCODONE HYDROCHLORIDE AND ACETAMINOPHEN 1 TABLET: 5; 325 TABLET ORAL at 19:01

## 2025-04-02 RX ADMIN — IBUPROFEN 600 MG: 600 TABLET, FILM COATED ORAL at 19:01

## 2025-04-02 RX ADMIN — CYCLOBENZAPRINE 10 MG: 10 TABLET, FILM COATED ORAL at 20:08

## 2025-04-02 RX ADMIN — ERTAPENEM SODIUM 1 G: 1 INJECTION, POWDER, LYOPHILIZED, FOR SOLUTION INTRAMUSCULAR; INTRAVENOUS at 14:35

## 2025-04-02 RX ADMIN — MORPHINE SULFATE 4 MG: 4 INJECTION, SOLUTION INTRAMUSCULAR; INTRAVENOUS at 06:30

## 2025-04-02 RX ADMIN — CYCLOBENZAPRINE 10 MG: 10 TABLET, FILM COATED ORAL at 06:30

## 2025-04-02 RX ADMIN — MORPHINE SULFATE 4 MG: 4 INJECTION, SOLUTION INTRAMUSCULAR; INTRAVENOUS at 10:43

## 2025-04-02 ASSESSMENT — PAIN SCALES - GENERAL
PAINLEVEL_OUTOF10: 6
PAINLEVEL_OUTOF10: 7
PAINLEVEL_OUTOF10: 7
PAINLEVEL_OUTOF10: 8
PAINLEVEL_OUTOF10: 9
PAINLEVEL_OUTOF10: 9
PAINLEVEL_OUTOF10: 6
PAINLEVEL_OUTOF10: 6
PAINLEVEL_OUTOF10: 3
PAINLEVEL_OUTOF10: 8
PAINLEVEL_OUTOF10: 5 - MODERATE PAIN
PAINLEVEL_OUTOF10: 8
PAINLEVEL_OUTOF10: 8

## 2025-04-02 ASSESSMENT — COGNITIVE AND FUNCTIONAL STATUS - GENERAL
MOBILITY SCORE: 24
DAILY ACTIVITIY SCORE: 24

## 2025-04-02 ASSESSMENT — PAIN DESCRIPTION - LOCATION
LOCATION: HAND
LOCATION: HAND

## 2025-04-02 ASSESSMENT — PAIN DESCRIPTION - ORIENTATION: ORIENTATION: RIGHT

## 2025-04-02 ASSESSMENT — PAIN - FUNCTIONAL ASSESSMENT
PAIN_FUNCTIONAL_ASSESSMENT: 0-10

## 2025-04-02 NOTE — PROGRESS NOTES
04/02/25 0837   Discharge Planning   Expected Discharge Disposition Home H  ([facility])     Received message yesterday evening that insurance was verified and [facility] pharmacy left message for patient. Spoke with patient this morning- he confirms that he received the message and states he will call the callback number.     5174- [facility] infusion pharmacy confirmed that insurance benefits were successfully communicated to patient. Per ID, end date for IV antibiotics is 4/21- notified intake team.     6430- Will need to reach out to [facility] intake team for start of care once dc date is confirmed. Patient will need confirmed SOC prior to d/c. Nurse manager updated MD that line care and any necessary wound care will need to be added to the [facility] orders.

## 2025-04-02 NOTE — PROGRESS NOTES
For follow-up of:  Right hand infection    Subjective   Interval History:  He has no complaints right now       Objective     Current Facility-Administered Medications   Medication Dose Route Frequency Provider Last Rate Last Admin    alteplase (Cathflo Activase) injection 2 mg  2 mg intra-catheter PRN Mayur Williamson MD        atorvastatin (Lipitor) tablet 20 mg  20 mg oral Nightly Mayur Williamson MD   20 mg at 04/01/25 2028    cyclobenzaprine (Flexeril) tablet 10 mg  10 mg oral TID PRN Mayur Williamson MD   10 mg at 04/01/25 1638    ertapenem (INVanz) 1 g in sodium chloride 0.9% IV 50 mL  1 g intravenous q24h Mayur Williamson MD   Stopped at 04/01/25 1606    ibuprofen tablet 600 mg  600 mg oral q6h PRN Mayur Williamson MD        losartan (Cozaar) tablet 100 mg  100 mg oral Nightly Mayur Williamson MD   100 mg at 04/01/25 2028    morphine injection 4 mg  4 mg intravenous q4h PRN Mayur Williamson MD   4 mg at 04/01/25 2044    oxyCODONE-acetaminophen (Percocet) 5-325 mg per tablet 1 tablet  1 tablet oral q6h PRN Mayur Williamson MD   1 tablet at 04/01/25 1638        Last Recorded Vitals  BP (!) 181/91 Comment: Dr Williamson made aware via text message- recheck 200/92  Pulse 65   Temp 37.6 °C (99.7 °F) (Temporal)   Resp 16   Wt 109 kg (240 lb 4.8 oz)   SpO2 97%   General: no acute distress, lying in bed, interactive  HEENT: pharynx not examined  CVS: RRR  Resp: decreased breath sounds in bases  ABD: soft, NT, ND  EXT: Left upper extremity midline is in place; right upper extremity is wrapped  Skin: no rash     Relevant Results:    Labs:   Results from last 72 hours   Lab Units 03/31/25  0747   SODIUM mmol/L 141   POTASSIUM mmol/L 4.3   CHLORIDE mmol/L 108*   BUN mg/dL 16   CREATININE mg/dL 0.84     Results from last 72 hours   Lab Units 03/31/25  0747   WBC AUTO x10*3/uL 3.9*   HEMOGLOBIN g/dL 13.2*   HEMATOCRIT % 41.2   PLATELETS AUTO x10*3/uL 186       Microbiology data: I have personally and independently  reviewed and intrepreted the lab results  3/9/2025 wound culture shows Enterobacter cloacae sensitive to ertapenem    Imaging data: I have personally and independently reviewed and interpreted the imaging studies  3/29/2025 CT head shows no acute issues    Assessment/Plan     MY IMPRESSION & RECOMMENDATIONS:  -Right hand surgical wound infection status post surgical debridement 3/31/2025  -Positive wound culture for Enterobacter cloacae susceptible to ertapenem  -Failed outpatient treatment with Bactrim and ciprofloxacin  -Status post right hand carpal tunnel surgery on 2/21  -History of hypertension, hyperlipidemia        PLAN:  -Continue ertapenem for approximately 20 more days  -Will start to arrange for home health care  -Closely monitor for antibiotics side effects including rash, Diarrhea/CDI, thrombocytopenia, KAI, etc.       All Colmenares MD

## 2025-04-02 NOTE — PROGRESS NOTES
"Sundar Gentile is a 59 y.o. male on day 5 of admission presenting with Wound infection after surgery.    Subjective   He denies any new problems or complaints.  He has maintained his dressing and splint as instructed.  He can now move his fingers today.  He is tolerating his diet.  He has had 1 normal bowel movement today.  He continues to struggle with pain control and burning sensation in his hand.       Objective     Physical Exam  On physical examination he is alert and oriented x 3, pleasant and cooperative, in no acute distress.    Right hand and wrist are examined.  The dressing and splint are removed wound is cleaned and dried.  The distal tips of all 5 digits are pink warm and have capillary refill of 1 to 2 seconds.  Distal tips of all 5 digits are intact to sensation of touch.  He demonstrates full range of motion of all 5 digits and the wrist.  The operative site is clean, dry, and intact.  Sutures are clean, dry, and intact.  Penrose drain is intact.  There is minimal serosanguineous drainage.  There is some tenderness to palpation of the proximal exit of the Penrose drain.  There is minimal edema.  There is no erythema.  There is minimal serosanguineous drainage.  The remainder the exam is within normal limits for him.    Last Recorded Vitals  Blood pressure (!) 179/100, pulse 56, temperature 35.6 °C (96.1 °F), temperature source Temporal, resp. rate 18, height 1.803 m (5' 10.98\"), weight 109 kg (240 lb 4.8 oz), SpO2 97%.  Intake/Output last 3 Shifts:  I/O last 3 completed shifts:  In: 975 (8.9 mL/kg) [I.V.:975 (8.9 mL/kg)]  Out: - (0 mL/kg)   Weight: 109 kg     Relevant Results    Scheduled medications  atorvastatin, 20 mg, oral, Nightly  ertapenem, 1 g, intravenous, q24h  losartan, 100 mg, oral, Nightly      Continuous medications     PRN medications  PRN medications: alteplase, cyclobenzaprine, ibuprofen, morphine, oxyCODONE-acetaminophen    Labs  No results found for this or any previous visit " (from the past 24 hours).     Imaging  Imaging  CT head wo IV contrast    Result Date: 3/29/2025  No evidence of acute intracranial hemorrhage or calvarial fracture.       MACRO: None     Signed by: Forrest Frost 3/29/2025 7:45 PM Dictation workstation:   [id number]     Cardiology, Vascular, and Other Imaging  Bedside PICC Imaging    Result Date: 3/28/2025  These images are not reportable by radiology and will not be interpreted by  Radiologists.          Assessment/Plan   Assessment & Plan  Wound infection after surgery    Postop day #2 status post exploration, irrigation, excisional debridement of skin, subcutaneous tissue and radical flexor synovectomy of the flexor tendons at the wrist level with complex secondary closure over Penrose drain.  1.  Dressing change performed today by me.  Continue present care with IV antibiotics as directed by infectious disease specialist.  Continue wound care by me.  If continues to improve may consider Penrose drain removal tomorrow.  2.  Discussed more consistent pain control with the patient and nursing staff regarding utilization of morphine, Percocet, and Motrin.  He agrees with current treatment plan.  3.  Continue present care and await arrangement of home health care for further IV antibiotics.       I spent 30 minutes in the professional and overall care of this patient.      Mayur Williamson MD

## 2025-04-02 NOTE — CARE PLAN
The patient's goals for the shift include      The clinical goals for the shift include see care plan      Problem: Skin  Goal: Decreased wound size/increased tissue granulation at next dressing change  Recent Flowsheet Documentation  Taken 4/2/2025 0758 by All Batres RN  Decreased wound size/increased tissue granulation at next dressing change: Promote sleep for wound healing  Goal: Participates in plan/prevention/treatment measures  Recent Flowsheet Documentation  Taken 4/2/2025 0758 by All Batres RN  Participates in plan/prevention/treatment measures: Elevate heels     Problem: Skin  Goal: Decreased wound size/increased tissue granulation at next dressing change  Flowsheets (Taken 4/2/2025 0758)  Decreased wound size/increased tissue granulation at next dressing change: Promote sleep for wound healing  Goal: Participates in plan/prevention/treatment measures  Flowsheets (Taken 4/2/2025 0758)  Participates in plan/prevention/treatment measures: Elevate heels

## 2025-04-02 NOTE — CARE PLAN
Problem: Pain - Adult  Goal: Verbalizes/displays adequate comfort level or baseline comfort level  Outcome: Progressing     Problem: Fall/Injury  Goal: Not fall by end of shift  Outcome: Progressing     Problem: Safety - Adult  Goal: Free from fall injury  Outcome: Progressing     Problem: Discharge Planning  Goal: Discharge to home or other facility with appropriate resources  Outcome: Progressing     Problem: Chronic Conditions and Co-morbidities  Goal: Patient's chronic conditions and co-morbidity symptoms are monitored and maintained or improved  Outcome: Progressing     Problem: Nutrition  Goal: Nutrient intake appropriate for maintaining nutritional needs  Outcome: Progressing     Problem: Skin  Goal: Decreased wound size/increased tissue granulation at next dressing change  Outcome: Progressing  Goal: Participates in plan/prevention/treatment measures  Outcome: Progressing     Problem: Pain  Goal: Takes deep breaths with improved pain control throughout the shift  Outcome: Progressing   The patient's goals for the shift include      The clinical goals for the shift include Patient will remain safe this shift.

## 2025-04-03 PROCEDURE — 2500000004 HC RX 250 GENERAL PHARMACY W/ HCPCS (ALT 636 FOR OP/ED)

## 2025-04-03 PROCEDURE — 2500000001 HC RX 250 WO HCPCS SELF ADMINISTERED DRUGS (ALT 637 FOR MEDICARE OP)

## 2025-04-03 PROCEDURE — 1100000001 HC PRIVATE ROOM DAILY

## 2025-04-03 RX ADMIN — LOSARTAN POTASSIUM 100 MG: 100 TABLET, FILM COATED ORAL at 21:52

## 2025-04-03 RX ADMIN — ATORVASTATIN CALCIUM 20 MG: 20 TABLET, FILM COATED ORAL at 21:52

## 2025-04-03 RX ADMIN — OXYCODONE HYDROCHLORIDE AND ACETAMINOPHEN 1 TABLET: 5; 325 TABLET ORAL at 09:39

## 2025-04-03 RX ADMIN — MORPHINE SULFATE 4 MG: 4 INJECTION, SOLUTION INTRAMUSCULAR; INTRAVENOUS at 01:49

## 2025-04-03 RX ADMIN — OXYCODONE HYDROCHLORIDE AND ACETAMINOPHEN 1 TABLET: 5; 325 TABLET ORAL at 15:52

## 2025-04-03 RX ADMIN — IBUPROFEN 600 MG: 600 TABLET, FILM COATED ORAL at 08:11

## 2025-04-03 RX ADMIN — ERTAPENEM SODIUM 1 G: 1 INJECTION, POWDER, LYOPHILIZED, FOR SOLUTION INTRAMUSCULAR; INTRAVENOUS at 15:13

## 2025-04-03 RX ADMIN — CYCLOBENZAPRINE 10 MG: 10 TABLET, FILM COATED ORAL at 09:39

## 2025-04-03 ASSESSMENT — COGNITIVE AND FUNCTIONAL STATUS - GENERAL
MOBILITY SCORE: 24
DAILY ACTIVITIY SCORE: 24

## 2025-04-03 ASSESSMENT — PAIN SCALES - GENERAL
PAINLEVEL_OUTOF10: 3
PAINLEVEL_OUTOF10: 3
PAINLEVEL_OUTOF10: 7
PAINLEVEL_OUTOF10: 6
PAINLEVEL_OUTOF10: 7

## 2025-04-03 ASSESSMENT — PAIN - FUNCTIONAL ASSESSMENT
PAIN_FUNCTIONAL_ASSESSMENT: 0-10
PAIN_FUNCTIONAL_ASSESSMENT: 0-10

## 2025-04-03 NOTE — CARE PLAN
Problem: Pain - Adult  Goal: Verbalizes/displays adequate comfort level or baseline comfort level  Outcome: Progressing     Problem: Fall/Injury  Goal: Not fall by end of shift  Outcome: Progressing     Problem: Safety - Adult  Goal: Free from fall injury  Outcome: Progressing     Problem: Discharge Planning  Goal: Discharge to home or other facility with appropriate resources  Outcome: Progressing     Problem: Chronic Conditions and Co-morbidities  Goal: Patient's chronic conditions and co-morbidity symptoms are monitored and maintained or improved  Outcome: Progressing     Problem: Nutrition  Goal: Nutrient intake appropriate for maintaining nutritional needs  Outcome: Progressing     Problem: Skin  Goal: Decreased wound size/increased tissue granulation at next dressing change  Outcome: Progressing  Goal: Participates in plan/prevention/treatment measures  Outcome: Progressing     Problem: Pain  Goal: Takes deep breaths with improved pain control throughout the shift  Outcome: Progressing   The patient's goals for the shift include      The clinical goals for the shift include Patient will remain safe this shift, with stable vital signs

## 2025-04-03 NOTE — PROGRESS NOTES
04/03/25 0829   Discharge Planning   Expected Discharge Disposition Home H  ([facility])   Intensity of Service   Intensity of Service >30 min     Per nursing, MD plans to discharge patient likely tomorrow. Message sent to [facility] infusion intake nurse requesting they review the orders today to ensure they have everything they need for patient to be able to d/c tomorrow.     1300- [facility] intake nurse confirmed that C orders look good. Message sent to intake nurse to ask if SOC can be confirmed for Saturday or if we will need to contact intake nurse tomorrow to process SOC for Saturday. Awaiting reply.

## 2025-04-04 ENCOUNTER — HOME INFUSION (OUTPATIENT)
Dept: INFUSION THERAPY | Age: 60
End: 2025-04-04
Payer: COMMERCIAL

## 2025-04-04 ENCOUNTER — DOCUMENTATION (OUTPATIENT)
Dept: HOME HEALTH SERVICES | Facility: HOME HEALTH | Age: 60
End: 2025-04-04
Payer: COMMERCIAL

## 2025-04-04 VITALS
TEMPERATURE: 97.2 F | HEART RATE: 86 BPM | DIASTOLIC BLOOD PRESSURE: 78 MMHG | BODY MASS INDEX: 33.64 KG/M2 | RESPIRATION RATE: 16 BRPM | SYSTOLIC BLOOD PRESSURE: 155 MMHG | OXYGEN SATURATION: 95 % | WEIGHT: 240.3 LBS | HEIGHT: 71 IN

## 2025-04-04 PROCEDURE — 2500000004 HC RX 250 GENERAL PHARMACY W/ HCPCS (ALT 636 FOR OP/ED)

## 2025-04-04 PROCEDURE — 2500000001 HC RX 250 WO HCPCS SELF ADMINISTERED DRUGS (ALT 637 FOR MEDICARE OP)

## 2025-04-04 RX ADMIN — IBUPROFEN 600 MG: 600 TABLET, FILM COATED ORAL at 08:11

## 2025-04-04 RX ADMIN — ERTAPENEM SODIUM 1 G: 1 INJECTION, POWDER, LYOPHILIZED, FOR SOLUTION INTRAMUSCULAR; INTRAVENOUS at 13:03

## 2025-04-04 ASSESSMENT — PAIN SCALES - GENERAL
PAINLEVEL_OUTOF10: 5 - MODERATE PAIN
PAINLEVEL_OUTOF10: 3
PAINLEVEL_OUTOF10: 3

## 2025-04-04 ASSESSMENT — PAIN DESCRIPTION - ORIENTATION: ORIENTATION: RIGHT

## 2025-04-04 ASSESSMENT — COGNITIVE AND FUNCTIONAL STATUS - GENERAL
MOBILITY SCORE: 24
DAILY ACTIVITIY SCORE: 24

## 2025-04-04 ASSESSMENT — PAIN DESCRIPTION - LOCATION: LOCATION: HAND

## 2025-04-04 NOTE — DISCHARGE INSTRUCTIONS
Post-Operative Instructions    These discharge instructions provide you with general information on caring for yourself after you leave the hospital. Your doctor may also give you specific instructions. If you have any problems or questions after discharge, please call Dr. Williamson office [phone].  Home Going Instructions:  Take over-the-counter or prescription medications for pain as directed. Resume your usual medications at home.   Keep your hand raised (elevated) for 2-3 days on 4-5 pillows above the level of your heart as much possible even when sleeping. This keeps swelling down and helps with discomfort.  Gently move your fingers to prevent stiffness, DO NOT USE HAND. Straighten fingers to full extension. No gripping weights, grabbing, pushing, pulling, lifting or carrying.  Use hand for very minimal activity. Ex: Buttons, Zippers.  When showering cover hand with a plastic bag to keep the dressing clean, dry and intact.   DO NOT CHANGE DRESSING, Dr. Williamson will remove dressing in his office during the follow up visit in 3-4 days.  Call your Doctor at his office if:  You develop severe pain not relieved by medications.  You develop bleeding from your surgical site.  You have an oral temperature about 101°F.  You develop redness or swelling of the surgical site.  SEEK IMMEDIATE MEDICAL CARE IF: You have chest pain, difficulty breathy, and/or if you develop a reaction or side effects to medication given.  For Your Safety since you were given sedation/anesthesia and will be taking pain medication:  Someone should stay with you for 24 hours.  Change positions slowly.

## 2025-04-04 NOTE — HH CARE COORDINATION
Home Care received a Referral for Infusion and Nursing. We have processed the referral for a Start of Care on 04/05/2025.     If you have any questions or concerns, please feel free to contact us at [phone]. Follow the prompts, enter your five digit zip code, and you will be directed to your care team on WEST 2.

## 2025-04-04 NOTE — NURSING NOTE
0812: Pt was medicated with PRN pain med.  
1130-assumed care of patient, alert and oriented times four, dressing clean, dry, and intact to RUE. Denies numbness and tingling, positive circ checks.  PIV to Left wrist intact and flushing, safety measures in place, call light in reach    1500-pt discharged, tolerated IV atb without adverse side effects, midline in place to LUE good blood return. PIV removed, safety measures in place  
1900 - patient awake and in good spirits. Call light within reach.    2330 - patient did let us know that he had a fall on Wednesday and his head has been feeling foggy. Provider notified. Patient sleeping, call light within reach.    0634 - patient was asleep throughout the night. Patient had an uneventful night, still sleeping, call night within reach.   
2125 - patient arrived to the floor from PACU. Patient in excruciating pain, crying and blood pressure 230/130. Messaged [facility] for orders @ 2132. Losartan given for blood pressure and orders put in for pain, morphine 4mg given through IV @ 2139.    2145 - patient calm and pain is much better. Family at bedside.     2223 - patient is complaining of pain again. Message sent to doctor, order put in for pain medication. Medication given per order. Family at bedside.    0000 - patient asked for pain medication, administered as ordered. Patient is lying down, call light within reach.    0415 - patient was medicated per MAR. Asked to go down to the cafe to get some snacks, went down with aid by wheelchair. Returned to room and right arm is elevated with 4 pillows per order.    
ADOD: 4/4.   Destination: home with [facility]   Transportation Provided by:  spouse  Patient feels updated by provider(s) and involved in POC.   Patient has been advised to defer to AVS for new medications and follow-up visits.   Patient is active with [employer].  Patient's Pharmacy [facility], [facility] home delivery.  Appointments will be made by patient.  Patient has been notified about a survey and call back is to be expected 1-2 weeks post discharge.   Notified patient that DC Navigator is available everyday throughout their stay if any assistance is needed.     Dr. Williamson to round today and perform dressing change with home going instructions to patient. ETA is unknown at this time as Dr. Williamson has surgery cases today.  [facility] to deliver prior to DC for any new prescriptions. [facility] to deliver IV antibiotics to patients home once SOC confirmed.   Updated patient at this time we are still pending SOC and will continue with daily IV Etrapenem as scheduled.   Patients wife will be in today and provide patient transportation home.  Will continue to follow       1500-  IVL dcd, site clear, midline to MAIKOL intact at time of DC. Notified [facility] patient be dcd at this time.   Reviewed AVS with patient, all questions answered.  Transport took patient via wheelchair with all his belongings for  by wife waiting by main entrance.   -late entry, patient was sent with supplies for dressing changes if needed. He is to follow up in 4 days and Dr. Williamson will perform the dressing changes.                 
EOS note:     Pt A&Ox4, independent, continent, reg diet. Pt OOB to use bathroom. No acute events this shift. Pt sleeping comfortably in bed. Safety maintained.   
Pt calm and cooperative through shift. Pt ambulated hallways this shift. Pt had right hand pain this shift and received PRN medications. Pt tolerated diet.   
Pt calm and cooperative through shift. Pt ambulated through shift and complained of right hand pain. IV pain meds were ordered and Pt tolerated IV abx and diet. Pt rested through shift with hand elevated on 4 pillows.   
Pt calm and cooperative through shift. Pt complained of pain in his right hand and was relieved with morphine PRN. Pt had IV abx and Midline placed. Pt up in room ambulating and tolerated diet. Pt rested through shift.   
Pt was medicated with PRN pain meds.    1552: Pt was medicated with PRN med.    1706: Pt had no acute changes noted this shift. Pt family was updated this shift.Pt was off the unit with family this shift. Pt ambulated halls. Pt was able to tolerate IV ATB well this shift. Pt safety been maintained.   
(4) no impairment

## 2025-04-04 NOTE — PROGRESS NOTES
DIAGNOSIS Post op wound infection  Allergies   Allergen Reactions    Lodine [Etodolac] Hives    Sutures Itching      REVIEW OF LABS AT DISCHARGE  LINE INFO: PT HAS A SL midline TO BE MANAGED PER [facility] PROTOCOL  PT ORDERED ertapenem 1 gm q24h THRU 4/21  LABS ORDERED INCLUDE CBC, BMP  SYSTEM [employer]  CARE PLAN DONE    Sundar Gentile IS A 59 y.o. male ORDERED ertapenem  FOLLOWED BY Dr Pereira    SOC confirmed for 4/5    RPh spoke with pt, informed him of medication name, delivery, dosing, etc. He verbalized understanding of instruction regarding medication and receipt/storage of package. Verified delivery address as [address] , [facility] [address].  to call [phone] on way, delivery ok anytime as wife is home.    RX DISPENSED THE FOLLOWING WITH SUPPLIES TO MATCH WITH DELIVERY 4/4  7x erta HP  DOS 4/5-4/11    FOLLOW UP 4/11 PROGRESS, LABS, DELIVER STRAIGHT

## 2025-04-04 NOTE — PROGRESS NOTES
"Sundar Gentile is a 59 y.o. male on day 7 of admission presenting with Wound infection after surgery.    Subjective   He is very happy.  He reports no pain today.  He states the burning pain is almost completely resolved since the Penrose drain was removed.  He is very happy with his results.  He would like to be discharged home today.  The IV antibiotic home therapy has been arranged.  He is tolerating his diet.  He reports regular bowel movement.       Objective     Physical Exam  On physical examination he is alert and oriented x 3, pleasant and cooperative, in no acute distress.    The right hand and wrist are examined.  The dressing and splint are removed.  The wound is cleaned and dried.  The distal tips of all 5 digits are pink, warm, capillary refill 1 to 2 seconds.  Distal tips of all 5 digits are intact to sensation of touch.  He demonstrates full range of motion of all 5 digits and the wrist.  Specifically incision site is clean dry and intact.  Sutures are clean dry and intact.  There is some minimal serosanguineous drainage.  There is no erythema.  There is some trace edema.  There is no pain to palpation.  Remainder of the exam is within normal limits for him.  Last Recorded Vitals  Blood pressure (!) 160/92, pulse 77, temperature 35.9 °C (96.6 °F), temperature source Temporal, resp. rate 16, height 1.803 m (5' 10.98\"), weight 109 kg (240 lb 4.8 oz), SpO2 95%.  Intake/Output last 3 Shifts:  I/O last 3 completed shifts:  In: 530 (4.9 mL/kg) [P.O.:480; IV Piggyback:50]  Out: - (0 mL/kg)   Weight: 109 kg     Relevant Results    Scheduled medications  atorvastatin, 20 mg, oral, Nightly  ertapenem, 1 g, intravenous, q24h  losartan, 100 mg, oral, Nightly      Continuous medications     PRN medications  PRN medications: alteplase, cyclobenzaprine, ibuprofen, morphine, oxyCODONE-acetaminophen    Labs  No results found for this or any previous visit (from the past 24 hours).     Imaging  Imaging  CT head wo IV " contrast    Result Date: 3/29/2025  No evidence of acute intracranial hemorrhage or calvarial fracture.       MACRO: None     Signed by: Forrest Frost 3/29/2025 7:45 PM Dictation workstation:   [id number]     Cardiology, Vascular, and Other Imaging  Bedside PICC Imaging    Result Date: 3/28/2025  These images are not reportable by radiology and will not be interpreted by  Radiologists.          Assessment/Plan   Assessment & Plan  Wound infection after surgery    Postop day #4 status post the exploration, irrigation, excisional debridement devitalized skin subcutaneous tissue, radical flexor synovectomy of the volar right palm/wrist wound with complex closure over Penrose drain.    1.  He demonstrates significant improvement.  Continue IV antibiotic therapy at home as directed by infectious disease Dr. Pereira.  Follow-up with me in 4 days    2.  Wound care has been discussed at length with him.  He is instructed to wash the wound twice a day with gentle soap and water and to apply clean sterile dressing and plaster splint.  He is instructed appropriate limitations in use and activity of this hand and wrist.    3.  Pain control as directed at home.  He states that his pain is now controlled with Motrin.  We have discussed the over-the-counter medication and its use and side effects.    4.  He may be discharged home at this time and follow-up with me on Tuesday, April 8, 2025.  He is instructed to continue to monitor for any issues, problems, or concerns.  He is instructed to return to the hospital or call me if he has any issues, problems, or concerns.  He understands all of our discussions.  He understands all of his instructions.  He will follow-up with me as directed.  He is discharged to home with the appropriate instructions.       I spent 30  minutes in the professional and overall care of this patient.      Mayur Williamson MD

## 2025-04-04 NOTE — PROGRESS NOTES
"Sundar Gentile is a 59 y.o. male on day 6 of admission presenting with Wound infection after surgery.    Subjective   He is seen on 4/3/2025 at 12:15 PM.  He is awake alert and oriented x 3, pleasant and cooperative, in no acute distress.  He states that he is tolerating his diet.  He states that he is having regular bowel movements.  He states that his pain is decreasing in his right hand/wrist.  He hopes to be able to get the Penrose drain out today and hopefully be discharged to home tomorrow on IV antibiotic therapy at home.  He denies any new problems or complaints.       Objective   On physical examination he is alert and oriented x 3, pleasant and cooperative, in no acute distress.    The right hand and wrist are examined.  Dressing and splint are removed.  Wound is cleaned and dried.  The distal tips of all 5 digits are pink, warm, capillary refill of 1 to 2 seconds.  The distal tips of all 5 digits are intact to sensation of touch.  He demonstrates full range of motion of all 5 digits and the wrist.  Specifically the operative site is clean and intact.  The Penrose drain is intact.  The sutures are clean and intact.  There is minimal serosanguineous drainage.  There is minimal sensitivity to palpation.  There is trace edema.  There is no erythema. The remainder the exam is within normal limits for him.  Physical Exam  Last Recorded Vitals  Blood pressure (!) 159/94, pulse (!) 48, temperature 36.1 °C (97 °F), temperature source Temporal, resp. rate 17, height 1.803 m (5' 10.98\"), weight 109 kg (240 lb 4.8 oz), SpO2 96%.  Intake/Output last 3 Shifts:  I/O last 3 completed shifts:  In: 530 (4.9 mL/kg) [P.O.:480; IV Piggyback:50]  Out: - (0 mL/kg)   Weight: 109 kg     Relevant Results    Scheduled medications  atorvastatin, 20 mg, oral, Nightly  ertapenem, 1 g, intravenous, q24h  losartan, 100 mg, oral, Nightly      Continuous medications     PRN medications  PRN medications: alteplase, cyclobenzaprine, " ibuprofen, morphine, oxyCODONE-acetaminophen    Labs  No results found for this or any previous visit (from the past 24 hours).     Imaging  Imaging  CT head wo IV contrast    Result Date: 3/29/2025  No evidence of acute intracranial hemorrhage or calvarial fracture.       MACRO: None     Signed by: Forrest Frost 3/29/2025 7:45 PM Dictation workstation:   [id number]     Cardiology, Vascular, and Other Imaging  Bedside PICC Imaging    Result Date: 3/28/2025  These images are not reportable by radiology and will not be interpreted by  Radiologists.          Assessment/Plan   Assessment & Plan  Wound infection after surgery    He is postop day #3 status post exploration, irrigation, excisional debridement devitalized skin and subcutaneous tissue, radical flexor synovectomy of the flexor tendons at the wrist level and complex secondary closure over a Penrose drain of the right palm/wrist wound.  1.  He demonstrates further progression and healing at this time.  The Penrose drain is removed today under sterile technique without difficulty.  Wound was further cleaned and dried and the appropriate sterile dressing and immobilizing plaster splint reapplied.  Continue wound care and IV antibiotics of ertapenem as directed by infectious disease specialist.  2.  Pain control has improved.  Continue present care  3.  Blood pressure improved.  Continue present care  4.  Possible discharge on 4/4/2025 if able to arrange home IV antibiotic therapy.       I spent 30 minutes in the professional and overall care of this patient.      Mayur Williamson MD

## 2025-04-04 NOTE — PROGRESS NOTES
For follow-up of:  Right hand infection    Subjective   Interval History:  He is sleeping        Objective     Current Facility-Administered Medications   Medication Dose Route Frequency Provider Last Rate Last Admin    alteplase (Cathflo Activase) injection 2 mg  2 mg intra-catheter PRN Mayur Williamson MD        atorvastatin (Lipitor) tablet 20 mg  20 mg oral Nightly Mayur Williamson MD   20 mg at 04/02/25 2005    cyclobenzaprine (Flexeril) tablet 10 mg  10 mg oral TID PRN Mayur Williamson MD   10 mg at 04/03/25 0939    ertapenem (INVanz) 1 g in sodium chloride 0.9% IV 50 mL  1 g intravenous q24h Mayur Williamson MD   Stopped at 04/03/25 1550    ibuprofen tablet 600 mg  600 mg oral q6h PRN Mayur Williamson MD   600 mg at 04/03/25 0811    losartan (Cozaar) tablet 100 mg  100 mg oral Nightly Mayur Williamson MD   100 mg at 04/02/25 2005    morphine injection 4 mg  4 mg intravenous q4h PRN Mayur Williamson MD   4 mg at 04/03/25 0149    oxyCODONE-acetaminophen (Percocet) 5-325 mg per tablet 1 tablet  1 tablet oral q6h PRN Mayur Williamson MD   1 tablet at 04/03/25 1552        Last Recorded Vitals  BP (!) 159/94 (BP Location: Right arm, Patient Position: Lying) Comment: Nurse notified  Pulse (!) 48 Comment: Nurse notified  Temp 36.1 °C (97 °F) (Temporal)   Resp 17   Wt 109 kg (240 lb 4.8 oz)   SpO2 96%   General: no acute distress, lying in bed, sleeping  HEENT: pharynx not examined  CVS: RRR  Resp: decreased breath sounds in bases  ABD: soft, NT, ND  EXT: Left upper extremity midline is in place; right upper extremity is wrapped  Skin: no rash     Relevant Results:    Labs:                 Microbiology data: I have personally and independently reviewed and intrepreted the lab results  3/9/2025 wound culture shows Enterobacter cloacae sensitive to ertapenem    Imaging data: I have personally and independently reviewed and interpreted the imaging studies  3/29/2025 CT head shows no acute issues    Assessment/Plan      MY IMPRESSION & RECOMMENDATIONS:  -Right hand surgical wound infection status post surgical debridement 3/31/2025  -Positive wound culture for Enterobacter cloacae susceptible to ertapenem  -Failed outpatient treatment with Bactrim and ciprofloxacin  -Status post right hand carpal tunnel surgery on 2/21  -History of hypertension, hyperlipidemia     PLAN:  -Continue ertapenem for approximately 18 more days (homegoing antibiotic ordered)  -Closely monitor for antibiotics side effects including rash, Diarrhea/CDI, thrombocytopenia, KAI, etc.       All Colmenares MD

## 2025-04-04 NOTE — CARE PLAN
The patient's goals for the shift include    Problem: Pain - Adult  Goal: Verbalizes/displays adequate comfort level or baseline comfort level  Outcome: Progressing     Problem: Fall/Injury  Goal: Not fall by end of shift  Outcome: Progressing     Problem: Safety - Adult  Goal: Free from fall injury  Outcome: Progressing     Problem: Discharge Planning  Goal: Discharge to home or other facility with appropriate resources  Outcome: Progressing     Problem: Chronic Conditions and Co-morbidities  Goal: Patient's chronic conditions and co-morbidity symptoms are monitored and maintained or improved  Outcome: Progressing     Problem: Nutrition  Goal: Nutrient intake appropriate for maintaining nutritional needs  Outcome: Progressing     Problem: Skin  Goal: Decreased wound size/increased tissue granulation at next dressing change  Outcome: Progressing  Goal: Participates in plan/prevention/treatment measures  Outcome: Progressing     Problem: Pain  Goal: Takes deep breaths with improved pain control throughout the shift  Outcome: Progressing       The clinical goals for the shift include See POC

## 2025-04-04 NOTE — PROGRESS NOTES
04/04/25 0951   Discharge Planning   Home or Post Acute Services In home services   Type of Home Care Services Home nursing visits   Expected Discharge Disposition Home H  ([facility] infusion)   Intensity of Service   Intensity of Service >30 min     [facility] infusion confirmed SOC for tomorrow. Patient to d/c home today after his antibiotic dose, once discharge orders are placed. Updated nursing and updated patient at bedside. Patient confirms that his wife will pick him up.

## 2025-04-05 ENCOUNTER — HOME CARE VISIT (OUTPATIENT)
Dept: HOME HEALTH SERVICES | Facility: HOME HEALTH | Age: 60
End: 2025-04-05
Payer: COMMERCIAL

## 2025-04-05 VITALS
RESPIRATION RATE: 18 BRPM | OXYGEN SATURATION: 97 % | DIASTOLIC BLOOD PRESSURE: 72 MMHG | HEART RATE: 74 BPM | TEMPERATURE: 97.4 F | SYSTOLIC BLOOD PRESSURE: 133 MMHG

## 2025-04-05 PROCEDURE — G0299 HHS/HOSPICE OF RN EA 15 MIN: HCPCS

## 2025-04-05 RX ADMIN — Medication 10 ML: at 10:40

## 2025-04-05 RX ADMIN — Medication 5 ML: at 11:30

## 2025-04-05 RX ADMIN — Medication 10 ML: at 11:29

## 2025-04-05 RX ADMIN — ERTAPENEM 1 G: 1 INJECTION, POWDER, LYOPHILIZED, FOR SOLUTION INTRAMUSCULAR; INTRAVENOUS at 10:50

## 2025-04-05 RX ADMIN — Medication 10 ML: at 10:49

## 2025-04-05 ASSESSMENT — ENCOUNTER SYMPTOMS
SUBJECTIVE PAIN PROGRESSION: WAXING AND WANING
PAIN LOCATION: RIGHT WRIST
LAST BOWEL MOVEMENT: 67299
LIMITED RANGE OF MOTION: 1
PERSON REPORTING PAIN: PATIENT
CHANGE IN APPETITE: UNCHANGED
APPETITE LEVEL: GOOD
PAIN: 1
LOWEST PAIN SEVERITY IN PAST 24 HOURS: 3/10
BOWEL PATTERN NORMAL: 1
PAIN LOCATION - PAIN QUALITY: ACHING
STOOL FREQUENCY: DAILY
PAIN LOCATION - PAIN SEVERITY: 5/10
HIGHEST PAIN SEVERITY IN PAST 24 HOURS: 7/10

## 2025-04-05 ASSESSMENT — ACTIVITIES OF DAILY LIVING (ADL)
ENTERING_EXITING_HOME: SUPERVISION
OASIS_M1830: 05

## 2025-04-07 NOTE — DISCHARGE SUMMARY
Discharge Diagnosis  Wound infection after surgery    Issues Requiring Follow-Up  Continue IV antibiotics at home as arranged.  Continue wound care as instructed.    Test Results Pending At Discharge  Pending Labs       Order Current Status    Surgical Pathology Exam In process            Hospital Course   Patient admitted with right palm/wrist wound infection requiring IV antibiotic therapy and surgical intervention for secondary closure over Penrose drain with continued wound care and IV antibiotics    Pertinent Physical Exam At Time of Discharge  Physical Exam see progress note on that day    Home Medications     Medication List      START taking these medications     ertapenem 1 gram injection; Commonly known as: INVanz; Infuse 1 g into a   venous catheter once daily for 20 days. Obtain weekly labs: BMP and CBC.   Fax to Dr. Pereira at [phone].     CONTINUE taking these medications     amphetamine-dextroamphetamine 10 mg tablet; Commonly known as: Adderall   atorvastatin 20 mg tablet; Commonly known as: Lipitor   calcium-magnesium 300-300 mg tablet   cyclobenzaprine 10 mg tablet; Commonly known as: Flexeril   FRUIT AND VEGETABLE DAILY ORAL   losartan 100 mg tablet; Commonly known as: Cozaar   naproxen 500 mg tablet; Commonly known as: Naprosyn   oxyCODONE-acetaminophen 7.5-325 mg tablet; Commonly known as: Percocet   sulfamethoxazole-trimethoprim 800-160 mg tablet; Commonly known as:   Bactrim DS   tadalafil 10 mg tablet; Commonly known as: Cialis   Vitamin C 500 mg chewable tablet; Generic drug: ascorbic acid     STOP taking these medications     PHENobarbital 60 mg tablet; Commonly known as: Luminal     ASK your doctor about these medications     ondansetron ODT 4 mg disintegrating tablet; Commonly known as:   Zofran-ODT       Outpatient Follow-Up  Future Appointments   Date Time Provider Department [facility]   4/10/2025 To Be Determined Jerrod Prasad RN [facility]   4/17/2025 To Be Determined Jerrod GALAVIZ  ZEUS Prasad [facility]   4/21/2025 To Be Determined Jerrod Prasad RN [facility]   2/9/2026  1:20 PM MD Aj Whatley None       Mayur Williamson MD

## 2025-04-08 DIAGNOSIS — T81.49XA WOUND INFECTION AFTER SURGERY: ICD-10-CM

## 2025-04-10 ENCOUNTER — HOME CARE VISIT (OUTPATIENT)
Dept: HOME HEALTH SERVICES | Facility: HOME HEALTH | Age: 60
End: 2025-04-10
Payer: COMMERCIAL

## 2025-04-10 VITALS
RESPIRATION RATE: 18 BRPM | SYSTOLIC BLOOD PRESSURE: 138 MMHG | DIASTOLIC BLOOD PRESSURE: 91 MMHG | OXYGEN SATURATION: 99 % | TEMPERATURE: 97.2 F | HEART RATE: 68 BPM

## 2025-04-10 PROCEDURE — G0299 HHS/HOSPICE OF RN EA 15 MIN: HCPCS

## 2025-04-10 RX ADMIN — Medication 10 ML: at 09:40

## 2025-04-10 RX ADMIN — Medication 20 ML: at 10:03

## 2025-04-10 RX ADMIN — Medication 5 ML: at 10:05

## 2025-04-10 ASSESSMENT — ENCOUNTER SYMPTOMS
BOWEL PATTERN NORMAL: 1
PERSON REPORTING PAIN: PATIENT
PAIN LOCATION - PAIN SEVERITY: 3/10
STOOL FREQUENCY: DAILY
LIMITED RANGE OF MOTION: 1
PAIN LOCATION: RIGHT WRIST
SUBJECTIVE PAIN PROGRESSION: UNCHANGED
LAST BOWEL MOVEMENT: 67304
PAIN: 1
HIGHEST PAIN SEVERITY IN PAST 24 HOURS: 4/10
LOWEST PAIN SEVERITY IN PAST 24 HOURS: 1/10

## 2025-04-11 ENCOUNTER — HOME INFUSION (OUTPATIENT)
Dept: INFUSION THERAPY | Age: 60
End: 2025-04-11
Payer: COMMERCIAL

## 2025-04-17 ENCOUNTER — HOME CARE VISIT (OUTPATIENT)
Dept: HOME HEALTH SERVICES | Facility: HOME HEALTH | Age: 60
End: 2025-04-17
Payer: COMMERCIAL

## 2025-04-17 ENCOUNTER — HOME INFUSION (OUTPATIENT)
Dept: INFUSION THERAPY | Age: 60
End: 2025-04-17
Payer: COMMERCIAL

## 2025-04-17 VITALS
TEMPERATURE: 97.6 F | RESPIRATION RATE: 18 BRPM | HEART RATE: 75 BPM | OXYGEN SATURATION: 97 % | DIASTOLIC BLOOD PRESSURE: 96 MMHG | SYSTOLIC BLOOD PRESSURE: 146 MMHG

## 2025-04-17 DIAGNOSIS — T81.49XA WOUND INFECTION AFTER SURGERY: Primary | ICD-10-CM

## 2025-04-17 PROCEDURE — G0299 HHS/HOSPICE OF RN EA 15 MIN: HCPCS

## 2025-04-17 RX ORDER — ERTAPENEM 1 G/1
1 INJECTION, POWDER, LYOPHILIZED, FOR SOLUTION INTRAMUSCULAR; INTRAVENOUS DAILY
Qty: 18 G | Refills: 0 | Status: SHIPPED
Start: 2025-04-17 | End: 2025-05-05

## 2025-04-17 RX ADMIN — Medication 5 ML: at 15:30

## 2025-04-17 RX ADMIN — Medication 10 ML: at 15:10

## 2025-04-17 RX ADMIN — Medication 20 ML: at 15:29

## 2025-04-17 ASSESSMENT — ENCOUNTER SYMPTOMS
PAIN LOCATION - PAIN QUALITY: ACHING
PERSON REPORTING PAIN: PATIENT
SUBJECTIVE PAIN PROGRESSION: GRADUALLY IMPROVING
HIGHEST PAIN SEVERITY IN PAST 24 HOURS: 9/10
PAIN LOCATION - PAIN SEVERITY: 4/10
PAIN: 1
PAIN LOCATION: RIGHT WRIST
APPETITE LEVEL: GOOD
LIMITED RANGE OF MOTION: 1
CHANGE IN APPETITE: UNCHANGED

## 2025-04-17 NOTE — PROGRESS NOTES
Sundar Gentile is receiving IV ertapenem 1g q24h for post op wound infection. Followed by Dr. Pereira  Labs ordered include CBC, BMP, and now added CRP to weekly labs    Received verbal order from Dr. Pereira to extend stop date to 5/5/25. Order updated in [facility], gold copy to intake.    RN visiting today for labs - CRP added on to weekly labs  Line SL midline    RX contacted patient, infusions going well. Has dose for tomorrow. Agreeable to delivery of a week of meds and supplies to match any time tomorrow. Requesting extra IV nettings - please send 3 extra.  to call on the way.    [facility] offered to  - pt stated no questions at this time.    Dispensing 4/18 with supplies and flushes to match for straight delivery:  7x ertapenem 1g HP  DOS 4/19-4/25    Follow up 4/25 check labs, progress, straight

## 2025-04-19 LAB
ANION GAP SERPL CALCULATED.4IONS-SCNC: 15 MMOL/L (CALC) (ref 7–17)
BUN SERPL-MCNC: 10 MG/DL (ref 7–25)
BUN/CREAT SERPL: NORMAL (CALC) (ref 6–22)
CALCIUM SERPL-MCNC: 9 MG/DL (ref 8.6–10.3)
CHLORIDE SERPL-SCNC: 102 MMOL/L (ref 98–110)
CO2 SERPL-SCNC: 21 MMOL/L (ref 20–32)
CREAT SERPL-MCNC: 0.81 MG/DL (ref 0.7–1.35)
CRP SERPL-MCNC: NORMAL MG/L
EGFRCR SERPLBLD CKD-EPI 2021: 100 ML/MIN/1.73M2
ERYTHROCYTE [DISTWIDTH] IN BLOOD BY AUTOMATED COUNT: 12.8 % (ref 11–15)
GLUCOSE SERPL-MCNC: 89 MG/DL (ref 65–99)
HCT VFR BLD AUTO: 43.4 % (ref 38.5–50)
HGB BLD-MCNC: 14.4 G/DL (ref 13.2–17.1)
MCH RBC QN AUTO: 31.3 PG (ref 27–33)
MCHC RBC AUTO-ENTMCNC: 33.2 G/DL (ref 32–36)
MCV RBC AUTO: 94.3 FL (ref 80–100)
PLATELET # BLD AUTO: 208 THOUSAND/UL (ref 140–400)
PMV BLD REES-ECKER: 11 FL (ref 7.5–12.5)
POTASSIUM SERPL-SCNC: 3.8 MMOL/L (ref 3.5–5.3)
RBC # BLD AUTO: 4.6 MILLION/UL (ref 4.2–5.8)
SODIUM SERPL-SCNC: 138 MMOL/L (ref 135–146)
WBC # BLD AUTO: 5.9 THOUSAND/UL (ref 3.8–10.8)

## 2025-04-21 LAB
ANION GAP SERPL CALCULATED.4IONS-SCNC: 15 MMOL/L (CALC) (ref 7–17)
BUN SERPL-MCNC: 10 MG/DL (ref 7–25)
BUN/CREAT SERPL: NORMAL (CALC) (ref 6–22)
CALCIUM SERPL-MCNC: 9 MG/DL (ref 8.6–10.3)
CHLORIDE SERPL-SCNC: 102 MMOL/L (ref 98–110)
CO2 SERPL-SCNC: 21 MMOL/L (ref 20–32)
CREAT SERPL-MCNC: 0.81 MG/DL (ref 0.7–1.35)
CRP SERPL-MCNC: <3 MG/L
EGFRCR SERPLBLD CKD-EPI 2021: 100 ML/MIN/1.73M2
ERYTHROCYTE [DISTWIDTH] IN BLOOD BY AUTOMATED COUNT: 12.8 % (ref 11–15)
GLUCOSE SERPL-MCNC: 89 MG/DL (ref 65–99)
HCT VFR BLD AUTO: 43.4 % (ref 38.5–50)
HGB BLD-MCNC: 14.4 G/DL (ref 13.2–17.1)
MCH RBC QN AUTO: 31.3 PG (ref 27–33)
MCHC RBC AUTO-ENTMCNC: 33.2 G/DL (ref 32–36)
MCV RBC AUTO: 94.3 FL (ref 80–100)
PLATELET # BLD AUTO: 208 THOUSAND/UL (ref 140–400)
PMV BLD REES-ECKER: 11 FL (ref 7.5–12.5)
POTASSIUM SERPL-SCNC: 3.8 MMOL/L (ref 3.5–5.3)
RBC # BLD AUTO: 4.6 MILLION/UL (ref 4.2–5.8)
SODIUM SERPL-SCNC: 138 MMOL/L (ref 135–146)
WBC # BLD AUTO: 5.9 THOUSAND/UL (ref 3.8–10.8)

## 2025-04-23 ENCOUNTER — HOME CARE VISIT (OUTPATIENT)
Dept: HOME HEALTH SERVICES | Facility: HOME HEALTH | Age: 60
End: 2025-04-23
Payer: COMMERCIAL

## 2025-04-23 ENCOUNTER — HOME INFUSION (OUTPATIENT)
Dept: INFUSION THERAPY | Age: 60
End: 2025-04-23
Payer: COMMERCIAL

## 2025-04-23 VITALS
OXYGEN SATURATION: 96 % | DIASTOLIC BLOOD PRESSURE: 93 MMHG | TEMPERATURE: 97.5 F | SYSTOLIC BLOOD PRESSURE: 145 MMHG | HEART RATE: 59 BPM | RESPIRATION RATE: 18 BRPM

## 2025-04-23 DIAGNOSIS — T81.49XA WOUND INFECTION AFTER SURGERY: Primary | ICD-10-CM

## 2025-04-23 PROCEDURE — G0299 HHS/HOSPICE OF RN EA 15 MIN: HCPCS

## 2025-04-23 RX ADMIN — Medication 20 ML: at 11:09

## 2025-04-23 RX ADMIN — Medication 10 ML: at 10:50

## 2025-04-23 RX ADMIN — Medication 5 ML: at 11:10

## 2025-04-23 ASSESSMENT — ENCOUNTER SYMPTOMS
HIGHEST PAIN SEVERITY IN PAST 24 HOURS: 7/10
PAIN: 1
BOWEL PATTERN NORMAL: 1
STOOL FREQUENCY: DAILY
PAIN LOCATION - PAIN FREQUENCY: CONSTANT
APPETITE LEVEL: GOOD
PAIN LOCATION: RIGHT HAND
PAIN LOCATION - PAIN SEVERITY: 7/10
SUBJECTIVE PAIN PROGRESSION: WAXING AND WANING
LAST BOWEL MOVEMENT: 67318
MUSCLE WEAKNESS: 1
LOWEST PAIN SEVERITY IN PAST 24 HOURS: 4/10
CHANGE IN APPETITE: UNCHANGED
PERSON REPORTING PAIN: PATIENT
PAIN LOCATION - PAIN QUALITY: BURNING
LIMITED RANGE OF MOTION: 1

## 2025-04-23 NOTE — PROGRESS NOTES
Patient seen today by homecare RN, Tj rPasad - photo of hand wound sent to Dr Pereira.  Orders for Ertapene through 5/5 - Midline expires Saturday - per Dr Pereira okay to remove Midline today - patient has appt with surgeon later today - wound cultures to be repeated    Orders to pull Midline entered into EPIC  Discharge from Infusion pharmacy service

## 2025-05-01 ENCOUNTER — HOSPITAL ENCOUNTER (OUTPATIENT)
Facility: HOSPITAL | Age: 60
Setting detail: OUTPATIENT SURGERY
End: 2025-05-01
Payer: COMMERCIAL

## 2025-05-01 DIAGNOSIS — T81.49XA SURGICAL WOUND INFECTION: Primary | ICD-10-CM

## 2025-05-02 ENCOUNTER — HOME CARE VISIT (OUTPATIENT)
Dept: HOME HEALTH SERVICES | Facility: HOME HEALTH | Age: 60
End: 2025-05-02
Payer: COMMERCIAL

## 2025-05-06 ENCOUNTER — DOCUMENTATION (OUTPATIENT)
Dept: HOME HEALTH SERVICES | Facility: HOME HEALTH | Age: 60
End: 2025-05-06
Payer: COMMERCIAL

## 2025-05-06 ENCOUNTER — HOME CARE VISIT (OUTPATIENT)
Dept: HOME HEALTH SERVICES | Facility: HOME HEALTH | Age: 60
End: 2025-05-06
Payer: COMMERCIAL

## 2025-05-06 NOTE — HH CARE COORDINATION
[facility] REFERRAL     Per case management for DC planning, pt will DC on oral antibiotics and Dc'd with no home care needs. Referral canceled by referral source Fabienne Donis.     Fabienne Donis 5/5/2025 15:03 (ET) -- -- Patient to DC home on oral abx, we can cancel this referral. Thanks  
Acidosis

## 2025-05-07 ENCOUNTER — APPOINTMENT (OUTPATIENT)
Dept: RADIOLOGY | Facility: HOSPITAL | Age: 60
DRG: 857 | End: 2025-05-07
Payer: COMMERCIAL

## 2025-05-07 ENCOUNTER — HOSPITAL ENCOUNTER (INPATIENT)
Facility: HOSPITAL | Age: 60
LOS: 6 days | Discharge: HOME | DRG: 857 | End: 2025-05-13
Payer: COMMERCIAL

## 2025-05-07 DIAGNOSIS — M67.831: ICD-10-CM

## 2025-05-07 DIAGNOSIS — T81.31XD WOUND DEHISCENCE, SURGICAL, SUBSEQUENT ENCOUNTER: ICD-10-CM

## 2025-05-07 DIAGNOSIS — T81.49XA WOUND INFECTION AFTER SURGERY: ICD-10-CM

## 2025-05-07 DIAGNOSIS — T14.8XXA WOUND INFECTION: Primary | ICD-10-CM

## 2025-05-07 DIAGNOSIS — T81.49XA SURGICAL WOUND INFECTION: ICD-10-CM

## 2025-05-07 DIAGNOSIS — L08.9 WOUND INFECTION: Primary | ICD-10-CM

## 2025-05-07 PROCEDURE — C1751 CATH, INF, PER/CENT/MIDLINE: HCPCS

## 2025-05-07 PROCEDURE — 36569 INSJ PICC 5 YR+ W/O IMAGING: CPT

## 2025-05-07 PROCEDURE — 2500000001 HC RX 250 WO HCPCS SELF ADMINISTERED DRUGS (ALT 637 FOR MEDICARE OP)

## 2025-05-07 PROCEDURE — 71045 X-RAY EXAM CHEST 1 VIEW: CPT

## 2025-05-07 PROCEDURE — 36573 INSJ PICC RS&I 5 YR+: CPT

## 2025-05-07 PROCEDURE — 1100000001 HC PRIVATE ROOM DAILY

## 2025-05-07 PROCEDURE — 2500000001 HC RX 250 WO HCPCS SELF ADMINISTERED DRUGS (ALT 637 FOR MEDICARE OP): Performed by: PHYSICIAN ASSISTANT

## 2025-05-07 PROCEDURE — 2780000003 HC OR 278 NO HCPCS

## 2025-05-07 PROCEDURE — 71045 X-RAY EXAM CHEST 1 VIEW: CPT | Performed by: STUDENT IN AN ORGANIZED HEALTH CARE EDUCATION/TRAINING PROGRAM

## 2025-05-07 PROCEDURE — 2500000004 HC RX 250 GENERAL PHARMACY W/ HCPCS (ALT 636 FOR OP/ED): Mod: JZ | Performed by: INTERNAL MEDICINE

## 2025-05-07 PROCEDURE — 02HV33Z INSERTION OF INFUSION DEVICE INTO SUPERIOR VENA CAVA, PERCUTANEOUS APPROACH: ICD-10-PCS

## 2025-05-07 PROCEDURE — 87081 CULTURE SCREEN ONLY: CPT | Mod: STJLAB | Performed by: INTERNAL MEDICINE

## 2025-05-07 RX ORDER — HEPARIN 100 UNIT/ML
0.5 SYRINGE INTRAVENOUS DAILY
Status: DISCONTINUED | OUTPATIENT
Start: 2025-05-08 | End: 2025-05-13 | Stop reason: HOSPADM

## 2025-05-07 RX ORDER — LIDOCAINE HYDROCHLORIDE 10 MG/ML
5 INJECTION, SOLUTION EPIDURAL; INFILTRATION; INTRACAUDAL; PERINEURAL ONCE
Status: COMPLETED | OUTPATIENT
Start: 2025-05-07 | End: 2025-05-07

## 2025-05-07 RX ORDER — SODIUM CHLORIDE 0.9 % (FLUSH) 0.9 %
10 SYRINGE (ML) INJECTION 2 TIMES DAILY
Status: DISCONTINUED | OUTPATIENT
Start: 2025-05-07 | End: 2025-05-13 | Stop reason: HOSPADM

## 2025-05-07 RX ORDER — ACETAMINOPHEN 325 MG/1
650 TABLET ORAL EVERY 4 HOURS PRN
Status: DISCONTINUED | OUTPATIENT
Start: 2025-05-07 | End: 2025-05-13 | Stop reason: HOSPADM

## 2025-05-07 RX ORDER — PANTOPRAZOLE SODIUM 20 MG/1
40 TABLET, DELAYED RELEASE ORAL
Status: DISCONTINUED | OUTPATIENT
Start: 2025-05-08 | End: 2025-05-13 | Stop reason: HOSPADM

## 2025-05-07 RX ORDER — ASCORBIC ACID 500 MG
500 TABLET ORAL DAILY
Status: DISCONTINUED | OUTPATIENT
Start: 2025-05-08 | End: 2025-05-13 | Stop reason: HOSPADM

## 2025-05-07 RX ORDER — ACETAMINOPHEN 650 MG/1
650 SUPPOSITORY RECTAL EVERY 4 HOURS PRN
Status: DISCONTINUED | OUTPATIENT
Start: 2025-05-07 | End: 2025-05-13 | Stop reason: HOSPADM

## 2025-05-07 RX ORDER — ACETAMINOPHEN 160 MG/5ML
650 SOLUTION ORAL EVERY 4 HOURS PRN
Status: DISCONTINUED | OUTPATIENT
Start: 2025-05-07 | End: 2025-05-13 | Stop reason: HOSPADM

## 2025-05-07 RX ORDER — CALCIUM CARBONATE 200(500)MG
1 TABLET,CHEWABLE ORAL DAILY
Status: DISCONTINUED | OUTPATIENT
Start: 2025-05-08 | End: 2025-05-13 | Stop reason: HOSPADM

## 2025-05-07 RX ORDER — CEFAZOLIN SODIUM 2 G/100ML
2 INJECTION, SOLUTION INTRAVENOUS EVERY 8 HOURS
Status: DISCONTINUED | OUTPATIENT
Start: 2025-05-07 | End: 2025-05-13 | Stop reason: HOSPADM

## 2025-05-07 RX ORDER — DEXTROAMPHETAMINE SACCHARATE, AMPHETAMINE ASPARTATE, DEXTROAMPHETAMINE SULFATE AND AMPHETAMINE SULFATE 2.5; 2.5; 2.5; 2.5 MG/1; MG/1; MG/1; MG/1
10 TABLET ORAL 2 TIMES DAILY PRN
Refills: 0 | Status: DISCONTINUED | OUTPATIENT
Start: 2025-05-07 | End: 2025-05-13 | Stop reason: HOSPADM

## 2025-05-07 RX ORDER — OXYCODONE HYDROCHLORIDE 5 MG/1
5 TABLET ORAL EVERY 6 HOURS PRN
Refills: 0 | Status: DISCONTINUED | OUTPATIENT
Start: 2025-05-07 | End: 2025-05-09

## 2025-05-07 RX ORDER — LOSARTAN POTASSIUM 100 MG/1
100 TABLET ORAL NIGHTLY
Status: DISCONTINUED | OUTPATIENT
Start: 2025-05-07 | End: 2025-05-13 | Stop reason: HOSPADM

## 2025-05-07 RX ORDER — CYCLOBENZAPRINE HCL 10 MG
10 TABLET ORAL 3 TIMES DAILY PRN
Status: DISCONTINUED | OUTPATIENT
Start: 2025-05-07 | End: 2025-05-13 | Stop reason: HOSPADM

## 2025-05-07 RX ORDER — ATORVASTATIN CALCIUM 20 MG/1
20 TABLET, FILM COATED ORAL NIGHTLY
Status: DISCONTINUED | OUTPATIENT
Start: 2025-05-07 | End: 2025-05-13 | Stop reason: HOSPADM

## 2025-05-07 RX ADMIN — CYCLOBENZAPRINE 10 MG: 10 TABLET, FILM COATED ORAL at 21:45

## 2025-05-07 RX ADMIN — ACETAMINOPHEN 650 MG: 325 TABLET ORAL at 21:07

## 2025-05-07 RX ADMIN — LOSARTAN POTASSIUM 100 MG: 100 TABLET, FILM COATED ORAL at 21:45

## 2025-05-07 RX ADMIN — CEFAZOLIN SODIUM 2 G: 2 INJECTION, SOLUTION INTRAVENOUS at 14:53

## 2025-05-07 RX ADMIN — LIDOCAINE HYDROCHLORIDE 50 MG: 10 INJECTION, SOLUTION EPIDURAL; INFILTRATION; INTRACAUDAL; PERINEURAL at 16:56

## 2025-05-07 RX ADMIN — CEFAZOLIN SODIUM 2 G: 2 INJECTION, SOLUTION INTRAVENOUS at 21:05

## 2025-05-07 RX ADMIN — Medication 10 ML: at 21:45

## 2025-05-07 RX ADMIN — ATORVASTATIN CALCIUM 20 MG: 20 TABLET, FILM COATED ORAL at 21:45

## 2025-05-07 RX ADMIN — OXYCODONE HYDROCHLORIDE 5 MG: 5 TABLET ORAL at 15:25

## 2025-05-07 SDOH — ECONOMIC STABILITY: FOOD INSECURITY: WITHIN THE PAST 12 MONTHS, YOU WORRIED THAT YOUR FOOD WOULD RUN OUT BEFORE YOU GOT MONEY TO BUY MORE.: NEVER TRUE

## 2025-05-07 SDOH — SOCIAL STABILITY: SOCIAL INSECURITY: HAVE YOU HAD THOUGHTS OF HARMING ANYONE ELSE?: NO

## 2025-05-07 SDOH — SOCIAL STABILITY: SOCIAL INSECURITY: WERE YOU ABLE TO COMPLETE ALL THE BEHAVIORAL HEALTH SCREENINGS?: YES

## 2025-05-07 SDOH — SOCIAL STABILITY: SOCIAL INSECURITY: WITHIN THE LAST YEAR, HAVE YOU BEEN AFRAID OF YOUR PARTNER OR EX-PARTNER?: NO

## 2025-05-07 SDOH — ECONOMIC STABILITY: FOOD INSECURITY: WITHIN THE PAST 12 MONTHS, THE FOOD YOU BOUGHT JUST DIDN'T LAST AND YOU DIDN'T HAVE MONEY TO GET MORE.: NEVER TRUE

## 2025-05-07 SDOH — SOCIAL STABILITY: SOCIAL INSECURITY: DOES ANYONE TRY TO KEEP YOU FROM HAVING/CONTACTING OTHER FRIENDS OR DOING THINGS OUTSIDE YOUR HOME?: NO

## 2025-05-07 SDOH — ECONOMIC STABILITY: INCOME INSECURITY: IN THE LAST 12 MONTHS, WAS THERE A TIME WHEN YOU WERE NOT ABLE TO PAY THE MORTGAGE OR RENT ON TIME?: NO

## 2025-05-07 SDOH — ECONOMIC STABILITY: TRANSPORTATION INSECURITY

## 2025-05-07 SDOH — ECONOMIC STABILITY: HOUSING INSECURITY: IN THE PAST 12 MONTHS HAS THE ELECTRIC, GAS, OIL, OR WATER COMPANY THREATENED TO SHUT OFF SERVICES IN YOUR HOME?: NO

## 2025-05-07 SDOH — ECONOMIC STABILITY: FOOD INSECURITY: WITHIN THE PAST 12 MONTHS, YOU WORRIED THAT YOUR FOOD WOULD RUN OUT BEFORE YOU GOT THE MONEY TO BUY MORE.: NEVER TRUE

## 2025-05-07 SDOH — SOCIAL STABILITY: SOCIAL INSECURITY: WITHIN THE LAST YEAR, HAVE YOU BEEN HUMILIATED OR EMOTIONALLY ABUSED IN OTHER WAYS BY YOUR PARTNER OR EX-PARTNER?: NO

## 2025-05-07 SDOH — SOCIAL STABILITY: SOCIAL INSECURITY: HAS ANYONE EVER THREATENED TO HURT YOUR FAMILY OR YOUR PETS?: NO

## 2025-05-07 SDOH — ECONOMIC STABILITY: INCOME INSECURITY: IN THE PAST 12 MONTHS HAS THE ELECTRIC, GAS, OIL, OR WATER COMPANY THREATENED TO SHUT OFF SERVICES IN YOUR HOME?: NO

## 2025-05-07 SDOH — ECONOMIC STABILITY: HOUSING INSECURITY: IN THE LAST 12 MONTHS, HOW MANY PLACES HAVE YOU LIVED?: 1

## 2025-05-07 SDOH — SOCIAL STABILITY: SOCIAL INSECURITY: DO YOU FEEL UNSAFE GOING BACK TO THE PLACE WHERE YOU ARE LIVING?: NO

## 2025-05-07 SDOH — SOCIAL STABILITY: SOCIAL INSECURITY: DO YOU FEEL ANYONE HAS EXPLOITED OR TAKEN ADVANTAGE OF YOU FINANCIALLY OR OF YOUR PERSONAL PROPERTY?: NO

## 2025-05-07 SDOH — SOCIAL STABILITY: SOCIAL INSECURITY: ARE THERE ANY APPARENT SIGNS OF INJURIES/BEHAVIORS THAT COULD BE RELATED TO ABUSE/NEGLECT?: NO

## 2025-05-07 SDOH — ECONOMIC STABILITY: FOOD INSECURITY

## 2025-05-07 SDOH — ECONOMIC STABILITY: HOUSING INSECURITY: IN THE LAST 12 MONTHS, WAS THERE A TIME WHEN YOU WERE NOT ABLE TO PAY THE MORTGAGE OR RENT ON TIME?: NO

## 2025-05-07 SDOH — SOCIAL STABILITY: SOCIAL INSECURITY: ABUSE: ADULT

## 2025-05-07 SDOH — SOCIAL STABILITY: SOCIAL INSECURITY: ARE YOU OR HAVE YOU BEEN THREATENED OR ABUSED PHYSICALLY, EMOTIONALLY, OR SEXUALLY BY ANYONE?: NO

## 2025-05-07 SDOH — ECONOMIC STABILITY: TRANSPORTATION INSECURITY: IN THE PAST 12 MONTHS, HAS LACK OF TRANSPORTATION KEPT YOU FROM MEDICAL APPOINTMENTS OR FROM GETTING MEDICATIONS?: NO

## 2025-05-07 SDOH — ECONOMIC STABILITY: GENERAL

## 2025-05-07 SDOH — ECONOMIC STABILITY: HOUSING INSECURITY

## 2025-05-07 ASSESSMENT — COGNITIVE AND FUNCTIONAL STATUS - GENERAL
PATIENT BASELINE BEDBOUND: NO
DAILY ACTIVITIY SCORE: 24
DAILY ACTIVITIY SCORE: 24
MOBILITY SCORE: 24
MOBILITY SCORE: 24
DAILY ACTIVITIY SCORE: 24
MOBILITY SCORE: 24

## 2025-05-07 ASSESSMENT — ACTIVITIES OF DAILY LIVING (ADL)
DRESSING YOURSELF: INDEPENDENT
LACK_OF_TRANSPORTATION: NO
HEARING - RIGHT EAR: FUNCTIONAL
GROOMING: INDEPENDENT
WALKS IN HOME: INDEPENDENT
HEARING - LEFT EAR: FUNCTIONAL
BATHING: INDEPENDENT
TOILETING: INDEPENDENT
JUDGMENT_ADEQUATE_SAFELY_COMPLETE_DAILY_ACTIVITIES: YES
PATIENT'S MEMORY ADEQUATE TO SAFELY COMPLETE DAILY ACTIVITIES?: YES
FEEDING YOURSELF: INDEPENDENT
ADEQUATE_TO_COMPLETE_ADL: YES
ASSISTIVE_DEVICE: EYEGLASSES
LACK_OF_TRANSPORTATION: NO

## 2025-05-07 ASSESSMENT — PAIN - FUNCTIONAL ASSESSMENT
PAIN_FUNCTIONAL_ASSESSMENT: 0-10

## 2025-05-07 ASSESSMENT — PAIN SCALES - GENERAL
PAINLEVEL_OUTOF10: 7
PAINLEVEL_OUTOF10: 3
PAINLEVEL_OUTOF10: 0 - NO PAIN
PAINLEVEL_OUTOF10: 0 - NO PAIN
PAINLEVEL_OUTOF10: 7
PAINLEVEL_OUTOF10: 4

## 2025-05-07 ASSESSMENT — PAIN DESCRIPTION - DESCRIPTORS
DESCRIPTORS: ACHING
DESCRIPTORS: ACHING

## 2025-05-07 ASSESSMENT — SOCIAL DETERMINANTS OF HEALTH (SDOH): IN THE PAST 12 MONTHS, HAS THE ELECTRIC, GAS, OIL, OR WATER COMPANY THREATENED TO SHUT OFF SERVICE IN YOUR HOME?: NO

## 2025-05-07 ASSESSMENT — COLUMBIA-SUICIDE SEVERITY RATING SCALE - C-SSRS
1. IN THE PAST MONTH, HAVE YOU WISHED YOU WERE DEAD OR WISHED YOU COULD GO TO SLEEP AND NOT WAKE UP?: NO
2. HAVE YOU ACTUALLY HAD ANY THOUGHTS OF KILLING YOURSELF?: NO
6. HAVE YOU EVER DONE ANYTHING, STARTED TO DO ANYTHING, OR PREPARED TO DO ANYTHING TO END YOUR LIFE?: NO

## 2025-05-07 ASSESSMENT — PATIENT HEALTH QUESTIONNAIRE - PHQ9
2. FEELING DOWN, DEPRESSED OR HOPELESS: NOT AT ALL
SUM OF ALL RESPONSES TO PHQ9 QUESTIONS 1 & 2: 0
1. LITTLE INTEREST OR PLEASURE IN DOING THINGS: NOT AT ALL

## 2025-05-07 ASSESSMENT — PAIN DESCRIPTION - ORIENTATION
ORIENTATION: RIGHT
ORIENTATION: RIGHT

## 2025-05-07 ASSESSMENT — LIFESTYLE VARIABLES
HOW OFTEN DO YOU HAVE A DRINK CONTAINING ALCOHOL: 2-4 TIMES A MONTH
HOW OFTEN DO YOU HAVE 6 OR MORE DRINKS ON ONE OCCASION: NEVER
AUDIT-C TOTAL SCORE: 2
SKIP TO QUESTIONS 9-10: 1
HOW MANY STANDARD DRINKS CONTAINING ALCOHOL DO YOU HAVE ON A TYPICAL DAY: 1 OR 2
AUDIT-C TOTAL SCORE: 2

## 2025-05-07 ASSESSMENT — PAIN DESCRIPTION - LOCATION
LOCATION: WRIST
LOCATION: HAND

## 2025-05-07 NOTE — CARE PLAN
The patient's goals for the shift include      The clinical goals for the shift include pain will be controlled this shift    Over the shift, the patient did make progress toward the following goals.

## 2025-05-07 NOTE — NURSING NOTE
1154 notified Dr Williamson pt wants to change code status from DNRCC to DNR as pt sts that he is willing to be intubated if need be. Pt up in room independent. Pt A&O x4, calm and cooperative.   1527 pt medicated with OXY for 7/10 right hand pain.   1532 pt taken off the floor to IR

## 2025-05-07 NOTE — CONSULTS
" Infectious Disease Consult    PATIENT NAME: Sundar Gentile    MRN: [id number]  SERVICE DATE:  5/7/2025   SERVICE TIME:  1:57 PM    SIGNATURE: Jey Pereira MD    PRIMARY CARE PHYSICIAN: Juarez Shin MD  REASON FOR CONSULT: Right hand surgical wound infection  REQUESTING PHYSICIAN:          ASSESSMENT :   -Right hand surgical wound infection  -Recent culture grew MSSA  -Previous wound culture for Enterobacter cloacae treated with approximately 4 weeks of ertapenem  -Status post right hand carpal tunnel surgery on 2/21  -History of hypertension, hyperlipidemia     PLAN:  - The patient was admitted with further surgical debridement  - Start cefazolin 2 g IV every 8 hours  - PICC line was ordered and anticipate at least 4 weeks of treatment  - Antibiotics stop date 6/4  - While on IV antibiotics please check weekly CRP, BUN/creatinine, CBC with differential, and LFTs and fax to [phone]  - Closely monitor for antibiotics side effects including rash, Diarrhea/CDI, thrombocytopenia, KAI, etc.    Case was discussed with Dr. Williamson and pharmacy team      Will continue to follow.          HPI  59-year-old male who underwent carpal tunnel surgery in February, complicated by wound infection with Enterobacter cloacae, treated with ertapenem, the wound did not heal completely, developed continuous drainage, was admitted recently at [facility] and most recent culture grew MSSA, he was discharged on Bactrim.  Now admitted for surgical debridement.     CURRENT ALLERGIES:   Allergies as of 05/06/2025 - Reviewed 04/05/2025   Allergen Reaction Noted    Lodine [etodolac] Hives 12/12/2023    Sutures Itching 04/08/2024     MEDICATIONS:  Current Medications[1]           PHYSICAL EXAM:  Patient Vitals for the past 24 hrs:   BP Temp Temp src Pulse Resp SpO2 Height Weight   05/07/25 1215 135/90 36.9 °C (98.4 °F) Temporal 86 17 96 % -- --   05/07/25 1207 -- -- -- -- -- -- 1.803 m (5' 10.98\") 108 kg (237 lb 3.4 oz)     Body mass index is " "33.1 kg/m².  Surgical wound picture as below      Labs:  Lab Results   Component Value Date    WBC 5.9 04/17/2025    HGB 14.4 04/17/2025    HCT 43.4 04/17/2025    MCV 94.3 04/17/2025     04/17/2025     Lab Results   Component Value Date    GLUCOSE 89 04/17/2025    CALCIUM 9.0 04/17/2025     04/17/2025    K 3.8 04/17/2025    CO2 21 04/17/2025     04/17/2025    BUN 10 04/17/2025    CREATININE 0.81 04/17/2025   ESR: --  Lab Results   Component Value Date    SEDRATE <1 09/06/2024     Lab Results   Component Value Date    CRP <3.0 04/17/2025   No results found for: \"ALT\", \"AST\", \"GGT\", \"ALKPHOS\", \"BILITOT\"    DATA:   Diagnostic tests reviewed for today's visit:    Labs this admission reviewed  Imagings this admission reviewed  Cultures: Reviewed        Thank you so much for this consultation         eJy Pereira MD.   Infectious Disease Attending        This note was partially created using voice recognition software and is inherently subject to errors including those of syntax and \"sound-alike\" substitutions which may escape proofreading. In such instances, original meaning may be extrapolated by contextual derivation            [1]   Current Facility-Administered Medications:     alteplase (Cathflo Activase) injection 2 mg, 2 mg, intra-catheter, PRN, Jey Pereira MD    ceFAZolin (Ancef) 2 g in dextrose (iso)  mL, 2 g, intravenous, q8h, Jey Pereira MD    lidocaine PF (Xylocaine) 10 mg/mL (1 %) injection 50 mg, 5 mL, infiltration, Once, Jey Pereira MD    "

## 2025-05-07 NOTE — CONSULTS
Reason For Consult   Education completed on risks/benefits/infection prevention/maintenance of line insertion  and patient verbalized understanding and gave verbal consent. PICC Catheter inserted to MAIKOL basilic vein with ultrasound guidance. Brisk blood return noted and line flushes easily. See LDA for details. Line secured with Stat-lock, biopatch placed and PICC Tegaderm dressing applied. PICC labeled per policy. Curos cap applied. Limb alert applied to left wrist. Bedside RN updated and discharge information placed in chart. Patient's bed in lowest position and call light w/n reach.    PICC ready for use upon chest x ray read        Kalli Smith RN

## 2025-05-07 NOTE — PROGRESS NOTES
05/07/25 1515   Discharge Planning   Living Arrangements Spouse/significant other   Support Systems Spouse/significant other   Type of Residence Private residence   Number of Stairs Within Residence   (independent)   Home or Post Acute Services In home services   Type of Home Care Services Home nursing visits   Expected Discharge Disposition Home Health   Does the patient need discharge transport arranged? No   Financial Resource Strain   How hard is it for you to pay for the very basics like food, housing, medical care, and heating? Not hard   Patient Choice   Patient / Family choosing to utilize agency / facility established prior to hospitalization Yes  (Active with [facility])   Intensity of Service   Intensity of Service 0-30 min     Met with pt regarding his dc plan. Pt admitted for PICC line placement, possible surgical debridement. Active with [facility], had a midline and was dc appx one month ago with IVAB. Familiar with process of arranging home IVAB. Would like to continue with [facility] and his established nurse (Jerrod). Wife Kelly will be the learner. Pt is independent. Secure chat started with [facility] intake nurse BERNIE Monae. ID note from today:   PLAN:  - The patient was admitted with further surgical debridement  - Start cefazolin 2 g IV every 8 hours  - PICC line was ordered and anticipate at least 4 weeks of treatment  - Antibiotics stop date 6/4  - While on IV antibiotics please check weekly CRP, BUN/creatinine, CBC with differential, and LFTs and fax to [phone]  - Closely monitor for antibiotics side effects including rash, Diarrhea/CDI, thrombocytopenia, KAI, etc.
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
## 2025-05-07 NOTE — CARE PLAN
Problem: Fall/Injury  Goal: Not fall by end of shift  Outcome: Progressing     Problem: Pain  Goal: Turns in bed with improved pain control throughout the shift  Outcome: Progressing     Problem: Pain - Adult  Goal: Verbalizes/displays adequate comfort level or baseline comfort level  Outcome: Progressing     Problem: Safety - Adult  Goal: Free from fall injury  Outcome: Progressing    The patient's goals for the shift include  have no pain    The clinical goals for the shift include patient to be free from fever or other signs of infection throughout end of shift    Patient having ok shift overall. Patient Aox4, RA, independent in the room. Patient had PICC line placed today in the left upper extremity, blood return noted and flushed. Patient will need 4-6 weeks of IV antibiotics per infectious disease note. Patient receiving Ancef while in the hospital. Patient endorses minimal pain in the right wrist at the site of the wound. Dr. Williamson with surgery managing patient orders. Patient safety maintained.

## 2025-05-08 PROBLEM — M67.831: Status: ACTIVE | Noted: 2025-05-06

## 2025-05-08 PROBLEM — T81.31XD WOUND DEHISCENCE, SURGICAL, SUBSEQUENT ENCOUNTER: Status: ACTIVE | Noted: 2025-05-06

## 2025-05-08 LAB
ANION GAP SERPL CALC-SCNC: 13 MMOL/L (ref 10–20)
BASOPHILS # BLD AUTO: 0.02 X10*3/UL (ref 0–0.1)
BASOPHILS NFR BLD AUTO: 0.5 %
BUN SERPL-MCNC: 12 MG/DL (ref 6–23)
CALCIUM SERPL-MCNC: 8.8 MG/DL (ref 8.6–10.3)
CHLORIDE SERPL-SCNC: 105 MMOL/L (ref 98–107)
CO2 SERPL-SCNC: 27 MMOL/L (ref 21–32)
CREAT SERPL-MCNC: 0.93 MG/DL (ref 0.5–1.3)
EGFRCR SERPLBLD CKD-EPI 2021: >90 ML/MIN/1.73M*2
EOSINOPHIL # BLD AUTO: 0.37 X10*3/UL (ref 0–0.7)
EOSINOPHIL NFR BLD AUTO: 8.5 %
ERYTHROCYTE [DISTWIDTH] IN BLOOD BY AUTOMATED COUNT: 12.8 % (ref 11.5–14.5)
GLUCOSE SERPL-MCNC: 100 MG/DL (ref 74–99)
HCT VFR BLD AUTO: 38.7 % (ref 41–52)
HGB BLD-MCNC: 12.6 G/DL (ref 13.5–17.5)
IMM GRANULOCYTES # BLD AUTO: 0.02 X10*3/UL (ref 0–0.7)
IMM GRANULOCYTES NFR BLD AUTO: 0.5 % (ref 0–0.9)
LYMPHOCYTES # BLD AUTO: 1.29 X10*3/UL (ref 1.2–4.8)
LYMPHOCYTES NFR BLD AUTO: 29.7 %
MCH RBC QN AUTO: 30.7 PG (ref 26–34)
MCHC RBC AUTO-ENTMCNC: 32.6 G/DL (ref 32–36)
MCV RBC AUTO: 94 FL (ref 80–100)
MONOCYTES # BLD AUTO: 0.45 X10*3/UL (ref 0.1–1)
MONOCYTES NFR BLD AUTO: 10.4 %
NEUTROPHILS # BLD AUTO: 2.19 X10*3/UL (ref 1.2–7.7)
NEUTROPHILS NFR BLD AUTO: 50.4 %
NRBC BLD-RTO: 0 /100 WBCS (ref 0–0)
PLATELET # BLD AUTO: 204 X10*3/UL (ref 150–450)
POTASSIUM SERPL-SCNC: 3.5 MMOL/L (ref 3.5–5.3)
RBC # BLD AUTO: 4.11 X10*6/UL (ref 4.5–5.9)
SODIUM SERPL-SCNC: 141 MMOL/L (ref 136–145)
WBC # BLD AUTO: 4.3 X10*3/UL (ref 4.4–11.3)

## 2025-05-08 PROCEDURE — 1100000001 HC PRIVATE ROOM DAILY

## 2025-05-08 PROCEDURE — 85025 COMPLETE CBC W/AUTO DIFF WBC: CPT

## 2025-05-08 PROCEDURE — 2500000001 HC RX 250 WO HCPCS SELF ADMINISTERED DRUGS (ALT 637 FOR MEDICARE OP): Performed by: PHYSICIAN ASSISTANT

## 2025-05-08 PROCEDURE — 80048 BASIC METABOLIC PNL TOTAL CA: CPT

## 2025-05-08 PROCEDURE — 2500000001 HC RX 250 WO HCPCS SELF ADMINISTERED DRUGS (ALT 637 FOR MEDICARE OP)

## 2025-05-08 PROCEDURE — 2500000004 HC RX 250 GENERAL PHARMACY W/ HCPCS (ALT 636 FOR OP/ED): Performed by: INTERNAL MEDICINE

## 2025-05-08 RX ORDER — DOCUSATE SODIUM 100 MG/1
100 CAPSULE, LIQUID FILLED ORAL 2 TIMES DAILY PRN
Status: DISCONTINUED | OUTPATIENT
Start: 2025-05-08 | End: 2025-05-13 | Stop reason: HOSPADM

## 2025-05-08 RX ADMIN — CEFAZOLIN SODIUM 2 G: 2 INJECTION, SOLUTION INTRAVENOUS at 05:19

## 2025-05-08 RX ADMIN — Medication 10 ML: at 21:13

## 2025-05-08 RX ADMIN — OXYCODONE HYDROCHLORIDE 5 MG: 5 TABLET ORAL at 08:31

## 2025-05-08 RX ADMIN — CEFAZOLIN SODIUM 2 G: 2 INJECTION, SOLUTION INTRAVENOUS at 14:32

## 2025-05-08 RX ADMIN — ACETAMINOPHEN 650 MG: 325 TABLET ORAL at 13:10

## 2025-05-08 RX ADMIN — ATORVASTATIN CALCIUM 20 MG: 20 TABLET, FILM COATED ORAL at 21:11

## 2025-05-08 RX ADMIN — ACETAMINOPHEN 650 MG: 325 TABLET ORAL at 21:12

## 2025-05-08 RX ADMIN — CALCIUM CARBONATE (ANTACID) CHEW TAB 500 MG 500 MG: 500 CHEW TAB at 08:31

## 2025-05-08 RX ADMIN — LOSARTAN POTASSIUM 100 MG: 100 TABLET, FILM COATED ORAL at 21:12

## 2025-05-08 RX ADMIN — CYCLOBENZAPRINE 10 MG: 10 TABLET, FILM COATED ORAL at 08:31

## 2025-05-08 RX ADMIN — OXYCODONE HYDROCHLORIDE AND ACETAMINOPHEN 500 MG: 500 TABLET ORAL at 08:31

## 2025-05-08 RX ADMIN — PANTOPRAZOLE SODIUM 40 MG: 20 TABLET, DELAYED RELEASE ORAL at 05:19

## 2025-05-08 RX ADMIN — CYCLOBENZAPRINE 10 MG: 10 TABLET, FILM COATED ORAL at 21:12

## 2025-05-08 RX ADMIN — Medication 10 ML: at 08:34

## 2025-05-08 RX ADMIN — CEFAZOLIN SODIUM 2 G: 2 INJECTION, SOLUTION INTRAVENOUS at 21:12

## 2025-05-08 RX ADMIN — DOCUSATE SODIUM 100 MG: 100 CAPSULE, LIQUID FILLED ORAL at 15:41

## 2025-05-08 RX ADMIN — OXYCODONE HYDROCHLORIDE 5 MG: 5 TABLET ORAL at 15:41

## 2025-05-08 ASSESSMENT — COGNITIVE AND FUNCTIONAL STATUS - GENERAL
DAILY ACTIVITIY SCORE: 24
MOBILITY SCORE: 24
DAILY ACTIVITIY SCORE: 24
MOBILITY SCORE: 24

## 2025-05-08 ASSESSMENT — PAIN SCALES - GENERAL
PAINLEVEL_OUTOF10: 5 - MODERATE PAIN
PAINLEVEL_OUTOF10: 4
PAINLEVEL_OUTOF10: 6
PAINLEVEL_OUTOF10: 0 - NO PAIN
PAINLEVEL_OUTOF10: 5 - MODERATE PAIN
PAINLEVEL_OUTOF10: 6
PAINLEVEL_OUTOF10: 0 - NO PAIN
PAINLEVEL_OUTOF10: 4
PAINLEVEL_OUTOF10: 2
PAINLEVEL_OUTOF10: 3
PAINLEVEL_OUTOF10: 0 - NO PAIN

## 2025-05-08 ASSESSMENT — PAIN - FUNCTIONAL ASSESSMENT
PAIN_FUNCTIONAL_ASSESSMENT: 0-10

## 2025-05-08 ASSESSMENT — PAIN DESCRIPTION - ORIENTATION
ORIENTATION: RIGHT

## 2025-05-08 ASSESSMENT — PAIN DESCRIPTION - LOCATION
LOCATION: HAND
LOCATION: WRIST

## 2025-05-08 ASSESSMENT — PAIN DESCRIPTION - DESCRIPTORS: DESCRIPTORS: ACHING

## 2025-05-08 NOTE — NURSING NOTE
0745 pt A&O, calm and cooperative. Pt going off the floor to cafeteria. Verbal ok from Dr Williamson to allow pt off the unit on his own as pt is independent and not on tele.   0831 pt medicated with PRN OXY and flexeril for 6/10 right hand pain.   1300 dressing changed by Dr Williamson at bedside.   1310 pt medicated with Tylenol for right hand pain.   1542 pt given colace d/t no BM this shift. Pt medicated with OXY for 5/10 right hand pain.

## 2025-05-08 NOTE — CARE PLAN
Problem: Fall/Injury  Goal: Be free from injury by end of the shift  Outcome: Progressing     Problem: Pain  Goal: Turns in bed with improved pain control throughout the shift  Outcome: Progressing     Problem: Pain - Adult  Goal: Verbalizes/displays adequate comfort level or baseline comfort level  Outcome: Progressing     Problem: Skin  Goal: Promote skin healing  Outcome: Progressing  Flowsheets (Taken 5/8/2025 1943)  Promote skin healing: Protective dressings over bony prominences    The patient's goals for the shift include  get good rest tonight    The clinical goals for the shift include patient to remain free from clinical signs of infection throughout end of shift    Patient having uneventful shift overall. Patient Aox4, RA, independent in the room. Patient has PICC in place in the [facility], receiving iv antibiotics for infection in the R wrist. Patient seen and managed by Dr. Williamson. Plan for patient to get surgery tomorrow around 1300 with Dr. Williamson. Patient aware of NPO status after midnight. Pain controlled well with alternating tylenol and oxycodone. Patient safety maintained. Dressings continued to be changed and managed by Dr. Williamson.

## 2025-05-08 NOTE — PROGRESS NOTES
[facility] Infusion Team updated that PICC line was placed yesterday.     1225 Per ID note plan for debridement tomorrow. [facility] Infusion Team updated.     1410 [facility] spoke with pt regarding costs. Pt agreeable.

## 2025-05-08 NOTE — PROGRESS NOTES
"Sundar Gentile is a 59 y.o. male on day 1 of admission presenting with Wound infection.    Subjective   He states he is tolerating his diet.  He has not had a bowel movement.  He is tolerating his IV antibiotics.  He reports significant decrease in pain and improved range of motion of his digits and the wrist.  He has maintained dressing as instructed.  His pain is controlled.  He is ready to have further surgery tomorrow.       Objective     Physical Exam  On physical examination he is alert and oriented x 3, pleasant and cooperative, in no acute distress.    The right hand is examined.  The dressing is removed.  The wound is cleaned and dried.  The distal tips of all 5 digits are pink, warm, capillary refill 1 to 2 seconds.  The distal tips of all 5 digits are intact to sensation of touch.  He demonstrates full range of motion of all 5 digits and the wrist.  There is decreased edema that is now minimal.  There is minimal erythema.  The wound remains open approximately 2 cm in diameter.  There is some serous drainage.  There is no purulent drainage.  There is minimal discomfort to palpation.  There are no Knievel signs.  There is no lymphangitic streaking.  Radial and ulnar pulses are +2/4 and palpable.  Nail plates are normal in position and appearance for him.  The remainder the exam is within normal limits for him.  Last Recorded Vitals  Blood pressure 163/88, pulse 65, temperature 35.9 °C (96.6 °F), temperature source Temporal, resp. rate 18, height 1.803 m (5' 10.98\"), weight 108 kg (237 lb 3.4 oz), SpO2 92%.  Intake/Output last 3 Shifts:  I/O last 3 completed shifts:  In: 300 (2.8 mL/kg) [IV Piggyback:300]  Out: - (0 mL/kg)   Weight: 107.6 kg     Relevant Results    Scheduled medications  Scheduled Medications[1]  Continuous medications  Continuous Medications[2]  PRN medications  PRN Medications[3]    Labs  Results for orders placed or performed during the hospital encounter of 05/07/25 (from the past 24 " hours)   Basic metabolic panel   Result Value Ref Range    Glucose 100 (H) 74 - 99 mg/dL    Sodium 141 136 - 145 mmol/L    Potassium 3.5 3.5 - 5.3 mmol/L    Chloride 105 98 - 107 mmol/L    Bicarbonate 27 21 - 32 mmol/L    Anion Gap 13 10 - 20 mmol/L    Urea Nitrogen 12 6 - 23 mg/dL    Creatinine 0.93 0.50 - 1.30 mg/dL    eGFR >90 >60 mL/min/1.73m*2    Calcium 8.8 8.6 - 10.3 mg/dL   CBC and Auto Differential   Result Value Ref Range    WBC 4.3 (L) 4.4 - 11.3 x10*3/uL    nRBC 0.0 0.0 - 0.0 /100 WBCs    RBC 4.11 (L) 4.50 - 5.90 x10*6/uL    Hemoglobin 12.6 (L) 13.5 - 17.5 g/dL    Hematocrit 38.7 (L) 41.0 - 52.0 %    MCV 94 80 - 100 fL    MCH 30.7 26.0 - 34.0 pg    MCHC 32.6 32.0 - 36.0 g/dL    RDW 12.8 11.5 - 14.5 %    Platelets 204 150 - 450 x10*3/uL    Neutrophils % 50.4 40.0 - 80.0 %    Immature Granulocytes %, Automated 0.5 0.0 - 0.9 %    Lymphocytes % 29.7 13.0 - 44.0 %    Monocytes % 10.4 2.0 - 10.0 %    Eosinophils % 8.5 0.0 - 6.0 %    Basophils % 0.5 0.0 - 2.0 %    Neutrophils Absolute 2.19 1.20 - 7.70 x10*3/uL    Immature Granulocytes Absolute, Automated 0.02 0.00 - 0.70 x10*3/uL    Lymphocytes Absolute 1.29 1.20 - 4.80 x10*3/uL    Monocytes Absolute 0.45 0.10 - 1.00 x10*3/uL    Eosinophils Absolute 0.37 0.00 - 0.70 x10*3/uL    Basophils Absolute 0.02 0.00 - 0.10 x10*3/uL        Imaging  Imaging  XR chest 1 view  Result Date: 5/7/2025  1.  No evidence of acute cardiopulmonary process. Left-sided PICC line with the tip in the atriocaval junction.       MACRO: None   Signed by: Josué Nunez 5/7/2025 6:50 PM Dictation workstation:   [id number]    CT radius ulna right w IV contrast  Result Date: 5/3/2025  IMPRESSION: Status post carpal tunnel surgery with findings suspicious for infectious ulnar bursitis, including persistent increased fluid and postcontrast enhancement in the common flexor synovial sheath (ulnar bursa) extending towards the fifth flexor digitorum tendon sheath. No CT findings of acute  osteomyelitis. Findings suggestive of scapholunate sprain. Degenerative arthritis as described : [facility]   Transcribe Date/Time: May  3 2025  8:42A Dictated by : MYAH PROCTOR MD This examination was interpreted and the report reviewed and electronically signed by: MYAH PROCTOR MD on May  3 2025  9:06AM  EST    CT hand right w IV contrast  Result Date: 5/3/2025  IMPRESSION: Status post carpal tunnel surgery with findings suspicious for infectious ulnar bursitis, including persistent increased fluid and postcontrast enhancement in the common flexor synovial sheath (ulnar bursa) extending towards the fifth flexor digitorum tendon sheath. No CT findings of acute osteomyelitis. Findings suggestive of scapholunate sprain. Degenerative arthritis as described : [facility]   Transcribe Date/Time: May  3 2025  8:42A Dictated by : MYAH PROCTOR MD This examination was interpreted and the report reviewed and electronically signed by: MYAH PROCTOR MD on May  3 2025  9:06AM  EST    XR wrist right 3+ views  Result Date: 5/2/2025  IMPRESSION: SOFT TISSUE SWELLING WITHOUT OTHER SIGNS OF SEPTIC ARTHRITIS OR OSTEOMYELITIS. SUSPECTED SCAPHOLUNATE DISSOCIATION. : [facility]   Transcribe Date/Time: May  2 2025  9:11A Dictated by : MATIAS LIMA MD This examination was interpreted and the report reviewed and electronically signed by: MATIAS LIMA MD on May  2 2025  9:14AM  EST    XR hand right 3+ views  Result Date: 5/2/2025  IMPRESSION: SOFT TISSUE SWELLING WITHOUT OTHER SIGNS OF SEPTIC ARTHRITIS OR OSTEOMYELITIS. SUSPECTED [facility]UNATE DIS[facility]IATION. : [facility]   Transcribe Date/Time: May  2 2025  9:11A Dictated by : MATIAS LIMA MD This examination was interpreted and the report reviewed and electronically signed by: MATIAS LIMA MD on May  2 2025  9:14AM  EST      Cardiology, Vascular, and Other Imaging  Bedside PICC Imaging  Result Date: 5/7/2025  These images are not reportable by  radiology and will not be interpreted by  Radiologists.    CT radius ulna right w IV contrast  Result Date: 5/3/2025  * * *Final Report* * * DATE OF EXAM: May  2 2025  7:36PM   [facility] [place]   [id number]  -  CT FOREARM W IVCON RT  / ACCESSION #  [id number] PROCEDURE REASON: Osteomyelitis suspected, forearm, no prior imaging      * * * * Physician Interpretation * * * *  REASON FOR STUDY:  Soft tissue infection suspected, hand, xray done . TECHNOLOGIST-PROVIDED HISTORY: Images approved by Alexx Salter Need to get forearm in field of view if possible TECHNIQUE: CT HAND W IVCON RT, CT FOREARM W IVCON RT CT Radiation dose: Integrated Dose-length product (DLP) for this visit =   613 mGy*cm. CT Dose Reduction Employed: Automated exposure control (AEC) Contrast:  IV administration of 100 ml of Omnipaque 350 COMPARISON: Radiograph of the RIGHT hand and RIGHT wrist dated 5/2/2025 RESULT: Skin irregularity and thickening is noted along the volar aspect, with area of fluid extending to the carpal tunnel (series 4 image 86 measuring 2.4 x 1.0 cm  in cross-section by 5.8 cm long. The amount of fluid, probably in the common flexor synovial sheath (ulnar bursa) is disproportionate to what would be expected for recent surgery in March. The fluid extends around the carpal tunnel, with flexor rectinacular and synovial thickening and postcontrast enhancement as seen on series 4 image 70 and both the fluid and slightly increased postcontrast enhancement are seen to extend towards fifth digit flexor digitorum tendon sheath, which contains slightly increased fluid. Note that CT technique is suboptimal for assessment of the soft tissue, but within the parameters of CT technique, toxic tenosynovitis is suspected in the fifth flexor digitorum tendon sheath. The the flexor retinaculum fluid extends proximally to the level of the distal radius around the flexor pollicis longus tendon. The tendons themselves are otherwise intact. There is no separate  drainable fluid collection aside from the increased fluid in the flexor retinaculum extending to the skin as described above. Moderate first carpometacarpal osteoarthritis is noted with joint space narrowing. Degenerative changes are also noted in the first metacarpophalangeal joint. Cystic changes in the trapezoid, capitate, and lunate are likely intraosseous ganglia or degenerative changes. Mild scaphotrapeziotrapezoid degenerative arthritis and radiocarpal degenerative arthritis are present. The median nerve is not well seen. The scapholunate interval is prominent measuring 4 mm, suspicious for scapholunate sprain, although the ligament itself cannot be visualized on CT. No other significant abnormality in the forearm. Localizer images: No additional findings. No other significant abnormality is seen. ---------------------------------------------    IMPRESSION: Status post carpal tunnel surgery with findings suspicious for infectious ulnar bursitis, including persistent increased fluid and postcontrast enhancement in the common flexor synovial sheath (ulnar bursa) extending towards the fifth flexor digitorum tendon sheath. No CT findings of acute osteomyelitis. Findings suggestive of scapholunate sprain. Degenerative arthritis as described : [facility]   Transcribe Date/Time: May  3 2025  8:42A Dictated by : MYAH PROCTOR MD This examination was interpreted and the report reviewed and electronically signed by: MYAH PROCTOR MD on May  3 2025  9:06AM  EST    CT hand right w IV contrast  Result Date: 5/3/2025  * * *Final Report* * * DATE OF EXAM: May  2 2025  7:36PM   [facility] [place]  [place]  CT HAND W IVCON RT  / ACCESSION #  [id number] PROCEDURE REASON: Soft tissue infection suspected, hand, xray done      * * * * Physician Interpretation * * * *  REASON FOR STUDY:  Soft tissue infection suspected, hand, xray done . TECHNOLOGIST-PROVIDED HISTORY: Images approved by Alexx Salter Need to get forearm in field of  view if possible TECHNIQUE: CT HAND W IVCON RT, CT FOREARM W IVCON RT CT Radiation dose: Integrated Dose-length product (DLP) for this visit =   613 mGy*cm. CT Dose Reduction Employed: Automated exposure control (AEC) Contrast:  IV administration of 100 ml of Omnipaque 350 COMPARISON: Radiograph of the RIGHT hand and RIGHT wrist dated 5/2/2025 RESULT: Skin irregularity and thickening is noted along the volar aspect, with area of fluid extending to the carpal tunnel (series 4 image 86 measuring 2.4 x 1.0 cm  in cross-section by 5.8 cm long. The amount of fluid, probably in the common flexor synovial sheath (ulnar bursa) is disproportionate to what would be expected for recent surgery in March. The fluid extends around the carpal tunnel, with flexor rectinacular and synovial thickening and postcontrast enhancement as seen on series 4 image 70 and both the fluid and slightly increased postcontrast enhancement are seen to extend towards fifth digit flexor digitorum tendon sheath, which contains slightly increased fluid. Note that CT technique is suboptimal for assessment of the soft tissue, but within the parameters of CT technique, toxic tenosynovitis is suspected in the fifth flexor digitorum tendon sheath. The the flexor retinaculum fluid extends proximally to the level of the distal radius around the flexor pollicis longus tendon. The tendons themselves are otherwise intact. There is no separate drainable fluid collection aside from the increased fluid in the flexor retinaculum extending to the skin as described above. Moderate first carpometacarpal osteoarthritis is noted with joint space narrowing. Degenerative changes are also noted in the first metacarpophalangeal joint. Cystic changes in the trapezoid, capitate, and lunate are likely intraosseous ganglia or degenerative changes. Mild scaphotrapeziotrapezoid degenerative arthritis and radiocarpal degenerative arthritis are present. The median nerve is not well  seen. The scapholunate interval is prominent measuring 4 mm, suspicious for scapholunate sprain, although the ligament itself cannot be visualized on CT. No other significant abnormality in the forearm. Localizer images: No additional findings. No other significant abnormality is seen. ---------------------------------------------    IMPRESSION: Status post carpal tunnel surgery with findings suspicious for infectious ulnar bursitis, including persistent increased fluid and postcontrast enhancement in the common flexor synovial sheath (ulnar bursa) extending towards the fifth flexor digitorum tendon sheath. No CT findings of acute osteomyelitis. Findings suggestive of scapholunate sprain. Degenerative arthritis as described : [facility]   Transcribe Date/Time: May  3 2025  8:42A Dictated by : MYAH PROCTOR MD This examination was interpreted and the report reviewed and electronically signed by: MYAH PROCTOR MD on May  3 2025  9:06AM  EST    XR wrist right 3+ views  Result Date: 5/2/2025  * * *Final Report* * * DATE OF EXAM: May  2 2025  9:04AM   [facility]   [id number]  -  XR WRIST 3V PA/LAT/OBL RT  / ACCESSION #  [id number] PROCEDURE REASON: Osteomyelitis      * * * * Physician Interpretation * * * *  HISTORY:  Osteomyelitis TECHNOLOGIST PROVIDED HISTORY (if applicable): Infection of carpal tunnel surgical site TECHNIQUE: XR WRIST 3V PA/LAT/OBL RT, XR HAND 3V PA/LAT/OBL RT RESULT: 3 views each of the RIGHT wrist and RIGHT hand are submitted. Mild soft tissue swelling is worse about the wrist. There are no underlying signs of septic arthritis or osteomyelitis. On the hand radiographs the scapholunate interval appears widened. No Rotary subluxation of the navicular or secondary degenerative changes appreciated.    IMPRESSION: SOFT TISSUE SWELLING WITHOUT OTHER SIGNS OF SEPTIC ARTHRITIS OR OSTEOMYELITIS. SUSPECTED SCAPHOLUNATE DISSOCIATION. : [facility]   Transcribe Date/Time: May  2 2025  9:11A Dictated by :  MATIAS LIMA MD This examination was interpreted and the report reviewed and electronically signed by: MATIAS LIMA MD on May  2 2025  9:14AM  EST    XR hand right 3+ views  Result Date: 5/2/2025  * * *Final Report* * * DATE OF EXAM: May  2 2025  9:04AM   [facility]   [id number]  -  XR HAND 3V PA/LAT/OBL RT  / ACCESSION #  [id number] PROCEDURE REASON: Osteomyelitis      * * * * Physician Interpretation * * * *  HISTORY:  Osteomyelitis TECHNOLOGIST PROVIDED HISTORY (if applicable): Infection of carpal tunnel surgical site TECHNIQUE: XR WRIST 3V PA/LAT/OBL RT, XR HAND 3V PA/LAT/OBL RT RESULT: 3 views each of the RIGHT wrist and RIGHT hand are submitted. Mild soft tissue swelling is worse about the wrist. There are no underlying signs of septic arthritis or osteomyelitis. On the hand radiographs the scapholunate interval appears widened. No Rotary subluxation of the navicular or secondary degenerative changes appreciated.    IMPRESSION: SOFT TISSUE SWELLING WITHOUT OTHER SIGNS OF SEPTIC ARTHRITIS OR OSTEOMYELITIS. SUSPECTED SC[facility]OLUNATE DISSOCIATION. : [facility]   Transcribe Date/Time: May  2 2025  9:11A Dictated by : AMTIAS LIMA MD This examination was interpreted and the report reviewed and electronically signed by: MATIAS LIMA MD on May  2 2025  9:14AM  EST           Assessment/Plan   Assessment & Plan  Wound infection    Surgical wound infection    Wound dehiscence, surgical, subsequent encounter    Other specified disorders of synovium, right wrist    Right volar palm/wrist surgical wound infection with dehiscence and synovitis/bursitis.  1.  Continue IV antibiotics as directed by infectious disease specialist  2.  Continue pain control as directed  3.  Continue diet and he will be n.p.o. after midnight  4.  Surgery arranged for tomorrow for exploration, excisional debridement devitalized skin subcutaneous tissue possible radical flexor synovectomy, possible excision ulnar bursa, possible excision mid palm  bursa possible primary closure versus complex closure versus complex closure with rotational/transposition flap of the right hand/palm/wrist.  5.  Begin a stool softener for constipation  6.  Continue present care as directed, I will continue to perform dressing changes.       I spent 30 minutes in the professional and overall care of this patient.      Mayur Williamson MD           [1] ascorbic acid, 500 mg, oral, Daily  atorvastatin, 20 mg, oral, Nightly  calcium carbonate, 1 tablet, oral, Daily  ceFAZolin, 2 g, intravenous, q8h  heparin flush, 0.5 mL, intravenous, Daily  losartan, 100 mg, oral, Nightly  pantoprazole, 40 mg, oral, Daily before breakfast  sodium chloride 0.9%, 10 mL, intravenous, BID    [2]    [3] PRN medications: acetaminophen **OR** acetaminophen **OR** acetaminophen, alteplase, amphetamine-dextroamphetamine, cyclobenzaprine, docusate sodium, oxyCODONE

## 2025-05-08 NOTE — CARE PLAN
The patient's goals for the shift include      The clinical goals for the shift include pt pain will be controlled this shift    Over the shift, the patient did  make progress toward the following goals with the help of PRN pain management.

## 2025-05-08 NOTE — PROGRESS NOTES
Spiritual Care Visit  Spiritual Care Request    Reason for Visit:  Routine Visit: Introduction     Request Received From:       Focus of Care:  Visited With: Patient         Refer to :          Spiritual Care Assessment    Spiritual Assessment:                      Care Provided:  Intended Effects: Aligning care plan with patient's values, Preserve dignity and respect, Demonstrate caring and concern, Establish rapport and connectedness  Methods: Assist with spiritual/Moravian practices, Offer spiritual/Moravian support  Interventions: Active listening, Ask guided questions, Share words of hope and inspiration    Sense of Community and or Restoration Affiliation:  Yarsanism Alevism         Addressed Needs/Concerns and/or Hai Through:          Outcome:        Advance Directives:         Spiritual Care Annotation

## 2025-05-08 NOTE — PROGRESS NOTES
" INPATIENT PROGRESS NOTES    PATIENT NAME: Sundar Gentile    MRN: [id number]  SERVICE DATE:  5/8/2025   SERVICE TIME:  11:06 AM    SIGNATURE: Jey Pereira MD      ASSESSMENT :   -Right hand surgical wound infection  -Recent culture grew MSSA  -Previous wound culture for Enterobacter cloacae treated with approximately 4 weeks of ertapenem  -Status post right hand carpal tunnel surgery on 2/21  -History of hypertension, hyperlipidemia     PLAN:  - PICC line in place   - Continue cefazolin 2 g IV every 8 hours  - Antibiotics stop date 6/4  - While on IV antibiotics please check weekly CRP, BUN/creatinine, CBC with differential, and LFTs and fax to [phone]  - Closely monitor for antibiotics side effects including rash, Diarrhea/CDI, thrombocytopenia, KAI, etc.  - For debridement tomorrow              SUBJECTIVE  Afebrile  No events overnight       OBJECTIVE  PHYSICAL EXAM:   Patient Vitals for the past 24 hrs:   BP Temp Temp src Pulse Resp SpO2 Height Weight   05/08/25 0800 163/88 35.9 °C (96.6 °F) Temporal 65 18 92 % -- --   05/08/25 0000 122/70 36.2 °C (97.2 °F) Temporal 61 16 99 % -- --   05/07/25 2000 142/88 36.4 °C (97.5 °F) Temporal 66 18 98 % -- --   05/07/25 1215 135/90 36.9 °C (98.4 °F) Temporal 86 17 96 % -- --   05/07/25 1207 -- -- -- -- -- -- 1.803 m (5' 10.98\") 108 kg (237 lb 3.4 oz)         Gen: NAD  Neck: symmetric, no mass  Cardiovascular: RRR  Respiratory: No distress       Labs:  Lab Results   Component Value Date    WBC 4.3 (L) 05/08/2025    HGB 12.6 (L) 05/08/2025    HCT 38.7 (L) 05/08/2025    MCV 94 05/08/2025     05/08/2025     Lab Results   Component Value Date    GLUCOSE 100 (H) 05/08/2025    CALCIUM 8.8 05/08/2025     05/08/2025    K 3.5 05/08/2025    CO2 27 05/08/2025     05/08/2025    BUN 12 05/08/2025    CREATININE 0.93 05/08/2025   ESR: --  Lab Results   Component Value Date    SEDRATE <1 09/06/2024     Lab Results   Component Value Date    CRP <3.0 04/17/2025   No " "results found for: \"ALT\", \"AST\", \"GGT\", \"ALKPHOS\", \"BILITOT\"      DATA:   Diagnostic tests reviewed for today's visit:    Labs this admission reviewed  Imagings this admission reviewed  Cultures: Reviewed        Jey Pereira MD.   Infectious Disease Attending            This note was partially created using voice recognition software and is inherently subject to errors including those of syntax and \"sound-alike\" substitutions which may escape proofreading. In such instances, original meaning may be extrapolated by contextual derivation       "

## 2025-05-09 ENCOUNTER — ANESTHESIA (OUTPATIENT)
Dept: OPERATING ROOM | Facility: HOSPITAL | Age: 60
End: 2025-05-09
Payer: COMMERCIAL

## 2025-05-09 ENCOUNTER — ANESTHESIA EVENT (OUTPATIENT)
Dept: OPERATING ROOM | Facility: HOSPITAL | Age: 60
End: 2025-05-09
Payer: COMMERCIAL

## 2025-05-09 LAB — STAPHYLOCOCCUS SPEC CULT: NORMAL

## 2025-05-09 PROCEDURE — 88305 TISSUE EXAM BY PATHOLOGIST: CPT | Mod: TC,STJLAB

## 2025-05-09 PROCEDURE — 2500000004 HC RX 250 GENERAL PHARMACY W/ HCPCS (ALT 636 FOR OP/ED): Performed by: INTERNAL MEDICINE

## 2025-05-09 PROCEDURE — 2720000007 HC OR 272 NO HCPCS

## 2025-05-09 PROCEDURE — 2500000001 HC RX 250 WO HCPCS SELF ADMINISTERED DRUGS (ALT 637 FOR MEDICARE OP)

## 2025-05-09 PROCEDURE — 3700000001 HC GENERAL ANESTHESIA TIME - INITIAL BASE CHARGE

## 2025-05-09 PROCEDURE — 88305 TISSUE EXAM BY PATHOLOGIST: CPT | Performed by: PATHOLOGY

## 2025-05-09 PROCEDURE — 2500000001 HC RX 250 WO HCPCS SELF ADMINISTERED DRUGS (ALT 637 FOR MEDICARE OP): Performed by: ANESTHESIOLOGY

## 2025-05-09 PROCEDURE — 7100000002 HC RECOVERY ROOM TIME - EACH INCREMENTAL 1 MINUTE

## 2025-05-09 PROCEDURE — 2500000004 HC RX 250 GENERAL PHARMACY W/ HCPCS (ALT 636 FOR OP/ED): Performed by: ANESTHESIOLOGY

## 2025-05-09 PROCEDURE — 2500000001 HC RX 250 WO HCPCS SELF ADMINISTERED DRUGS (ALT 637 FOR MEDICARE OP): Performed by: PHYSICIAN ASSISTANT

## 2025-05-09 PROCEDURE — 0LB70ZZ EXCISION OF RIGHT HAND TENDON, OPEN APPROACH: ICD-10-PCS

## 2025-05-09 PROCEDURE — 2500000004 HC RX 250 GENERAL PHARMACY W/ HCPCS (ALT 636 FOR OP/ED): Mod: JZ | Performed by: ANESTHESIOLOGY

## 2025-05-09 PROCEDURE — 7100000001 HC RECOVERY ROOM TIME - INITIAL BASE CHARGE

## 2025-05-09 PROCEDURE — 2500000004 HC RX 250 GENERAL PHARMACY W/ HCPCS (ALT 636 FOR OP/ED): Mod: JW | Performed by: ANESTHESIOLOGY

## 2025-05-09 PROCEDURE — 87075 CULTR BACTERIA EXCEPT BLOOD: CPT | Mod: STJLAB

## 2025-05-09 PROCEDURE — 3700000002 HC GENERAL ANESTHESIA TIME - EACH INCREMENTAL 1 MINUTE

## 2025-05-09 PROCEDURE — 87070 CULTURE OTHR SPECIMN AEROBIC: CPT | Mod: STJLAB

## 2025-05-09 PROCEDURE — 2500000005 HC RX 250 GENERAL PHARMACY W/O HCPCS

## 2025-05-09 PROCEDURE — 1100000001 HC PRIVATE ROOM DAILY

## 2025-05-09 PROCEDURE — 2500000004 HC RX 250 GENERAL PHARMACY W/ HCPCS (ALT 636 FOR OP/ED): Mod: JZ | Performed by: ANESTHESIOLOGIST ASSISTANT

## 2025-05-09 PROCEDURE — 3600000003 HC OR TIME - INITIAL BASE CHARGE - PROCEDURE LEVEL THREE

## 2025-05-09 PROCEDURE — 2500000005 HC RX 250 GENERAL PHARMACY W/O HCPCS: Performed by: ANESTHESIOLOGY

## 2025-05-09 PROCEDURE — 3600000008 HC OR TIME - EACH INCREMENTAL 1 MINUTE - PROCEDURE LEVEL THREE

## 2025-05-09 PROCEDURE — 2500000004 HC RX 250 GENERAL PHARMACY W/ HCPCS (ALT 636 FOR OP/ED): Mod: JZ

## 2025-05-09 PROCEDURE — 2500000004 HC RX 250 GENERAL PHARMACY W/ HCPCS (ALT 636 FOR OP/ED)

## 2025-05-09 PROCEDURE — 88304 TISSUE EXAM BY PATHOLOGIST: CPT | Performed by: PATHOLOGY

## 2025-05-09 RX ORDER — MIDAZOLAM HYDROCHLORIDE 1 MG/ML
1 INJECTION, SOLUTION INTRAMUSCULAR; INTRAVENOUS ONCE AS NEEDED
Status: COMPLETED | OUTPATIENT
Start: 2025-05-09 | End: 2025-05-09

## 2025-05-09 RX ORDER — OXYCODONE HYDROCHLORIDE 10 MG/1
10 TABLET ORAL EVERY 6 HOURS PRN
Status: DISCONTINUED | OUTPATIENT
Start: 2025-05-09 | End: 2025-05-13 | Stop reason: HOSPADM

## 2025-05-09 RX ORDER — ONDANSETRON HYDROCHLORIDE 2 MG/ML
INJECTION, SOLUTION INTRAVENOUS AS NEEDED
Status: DISCONTINUED | OUTPATIENT
Start: 2025-05-09 | End: 2025-05-09

## 2025-05-09 RX ORDER — ACETAMINOPHEN 325 MG/1
975 TABLET ORAL ONCE
Status: DISCONTINUED | OUTPATIENT
Start: 2025-05-09 | End: 2025-05-09 | Stop reason: HOSPADM

## 2025-05-09 RX ORDER — MIDAZOLAM HYDROCHLORIDE 1 MG/ML
INJECTION, SOLUTION INTRAMUSCULAR; INTRAVENOUS AS NEEDED
Status: DISCONTINUED | OUTPATIENT
Start: 2025-05-09 | End: 2025-05-09

## 2025-05-09 RX ORDER — ACETAMINOPHEN 325 MG/1
650 TABLET ORAL EVERY 4 HOURS PRN
Status: DISCONTINUED | OUTPATIENT
Start: 2025-05-09 | End: 2025-05-09 | Stop reason: HOSPADM

## 2025-05-09 RX ORDER — OXYCODONE AND ACETAMINOPHEN 5; 325 MG/1; MG/1
1 TABLET ORAL EVERY 4 HOURS PRN
Status: DISCONTINUED | OUTPATIENT
Start: 2025-05-09 | End: 2025-05-09 | Stop reason: HOSPADM

## 2025-05-09 RX ORDER — HYDRALAZINE HYDROCHLORIDE 20 MG/ML
10 INJECTION INTRAMUSCULAR; INTRAVENOUS EVERY 30 MIN PRN
Status: DISCONTINUED | OUTPATIENT
Start: 2025-05-09 | End: 2025-05-09 | Stop reason: HOSPADM

## 2025-05-09 RX ORDER — PHENYLEPHRINE HCL IN 0.9% NACL 1 MG/10 ML
SYRINGE (ML) INTRAVENOUS AS NEEDED
Status: DISCONTINUED | OUTPATIENT
Start: 2025-05-09 | End: 2025-05-09

## 2025-05-09 RX ORDER — OXYCODONE HYDROCHLORIDE 10 MG/1
10 TABLET ORAL EVERY 4 HOURS PRN
Status: DISCONTINUED | OUTPATIENT
Start: 2025-05-09 | End: 2025-05-09 | Stop reason: HOSPADM

## 2025-05-09 RX ORDER — BACITRACIN ZINC 500 UNIT/G
OINTMENT IN PACKET (EA) TOPICAL AS NEEDED
Status: DISCONTINUED | OUTPATIENT
Start: 2025-05-09 | End: 2025-05-09 | Stop reason: HOSPADM

## 2025-05-09 RX ORDER — HYDROMORPHONE HYDROCHLORIDE 1 MG/ML
INJECTION, SOLUTION INTRAMUSCULAR; INTRAVENOUS; SUBCUTANEOUS AS NEEDED
Status: DISCONTINUED | OUTPATIENT
Start: 2025-05-09 | End: 2025-05-09

## 2025-05-09 RX ORDER — SODIUM CHLORIDE, SODIUM LACTATE, POTASSIUM CHLORIDE, CALCIUM CHLORIDE 600; 310; 30; 20 MG/100ML; MG/100ML; MG/100ML; MG/100ML
125 INJECTION, SOLUTION INTRAVENOUS CONTINUOUS
Status: ACTIVE | OUTPATIENT
Start: 2025-05-09 | End: 2025-05-09

## 2025-05-09 RX ORDER — ALBUTEROL SULFATE 0.83 MG/ML
2.5 SOLUTION RESPIRATORY (INHALATION) ONCE AS NEEDED
Status: DISCONTINUED | OUTPATIENT
Start: 2025-05-09 | End: 2025-05-09 | Stop reason: HOSPADM

## 2025-05-09 RX ORDER — LABETALOL HYDROCHLORIDE 5 MG/ML
INJECTION, SOLUTION INTRAVENOUS AS NEEDED
Status: DISCONTINUED | OUTPATIENT
Start: 2025-05-09 | End: 2025-05-09

## 2025-05-09 RX ORDER — SODIUM CHLORIDE, SODIUM LACTATE, POTASSIUM CHLORIDE, CALCIUM CHLORIDE 600; 310; 30; 20 MG/100ML; MG/100ML; MG/100ML; MG/100ML
INJECTION, SOLUTION INTRAVENOUS CONTINUOUS PRN
Status: DISCONTINUED | OUTPATIENT
Start: 2025-05-09 | End: 2025-05-09

## 2025-05-09 RX ORDER — PROCHLORPERAZINE EDISYLATE 5 MG/ML
5 INJECTION INTRAMUSCULAR; INTRAVENOUS ONCE AS NEEDED
Status: DISCONTINUED | OUTPATIENT
Start: 2025-05-09 | End: 2025-05-09 | Stop reason: HOSPADM

## 2025-05-09 RX ORDER — HYDROMORPHONE HYDROCHLORIDE 0.2 MG/ML
0.1 INJECTION INTRAMUSCULAR; INTRAVENOUS; SUBCUTANEOUS EVERY 5 MIN PRN
Status: DISCONTINUED | OUTPATIENT
Start: 2025-05-09 | End: 2025-05-09 | Stop reason: HOSPADM

## 2025-05-09 RX ORDER — ONDANSETRON HYDROCHLORIDE 2 MG/ML
4 INJECTION, SOLUTION INTRAVENOUS ONCE AS NEEDED
Status: COMPLETED | OUTPATIENT
Start: 2025-05-09 | End: 2025-05-09

## 2025-05-09 RX ORDER — MORPHINE SULFATE 2 MG/ML
2 INJECTION, SOLUTION INTRAMUSCULAR; INTRAVENOUS EVERY 2 HOUR PRN
Status: DISCONTINUED | OUTPATIENT
Start: 2025-05-09 | End: 2025-05-10

## 2025-05-09 RX ORDER — LABETALOL HYDROCHLORIDE 5 MG/ML
10 INJECTION, SOLUTION INTRAVENOUS EVERY 30 MIN PRN
Status: DISCONTINUED | OUTPATIENT
Start: 2025-05-09 | End: 2025-05-09 | Stop reason: HOSPADM

## 2025-05-09 RX ORDER — PROPOFOL 10 MG/ML
INJECTION, EMULSION INTRAVENOUS AS NEEDED
Status: DISCONTINUED | OUTPATIENT
Start: 2025-05-09 | End: 2025-05-09

## 2025-05-09 RX ORDER — KETOROLAC TROMETHAMINE 30 MG/ML
INJECTION, SOLUTION INTRAMUSCULAR; INTRAVENOUS AS NEEDED
Status: DISCONTINUED | OUTPATIENT
Start: 2025-05-09 | End: 2025-05-09

## 2025-05-09 RX ORDER — HYDROMORPHONE HYDROCHLORIDE 1 MG/ML
1 INJECTION, SOLUTION INTRAMUSCULAR; INTRAVENOUS; SUBCUTANEOUS EVERY 5 MIN PRN
Status: DISCONTINUED | OUTPATIENT
Start: 2025-05-09 | End: 2025-05-09 | Stop reason: HOSPADM

## 2025-05-09 RX ORDER — LABETALOL HYDROCHLORIDE 5 MG/ML
10 INJECTION, SOLUTION INTRAVENOUS
Status: DISCONTINUED | OUTPATIENT
Start: 2025-05-09 | End: 2025-05-09 | Stop reason: HOSPADM

## 2025-05-09 RX ORDER — DEXAMETHASONE SODIUM PHOSPHATE 10 MG/ML
INJECTION INTRAMUSCULAR; INTRAVENOUS AS NEEDED
Status: DISCONTINUED | OUTPATIENT
Start: 2025-05-09 | End: 2025-05-09

## 2025-05-09 RX ORDER — MORPHINE SULFATE 2 MG/ML
2 INJECTION, SOLUTION INTRAMUSCULAR; INTRAVENOUS EVERY 4 HOURS PRN
Status: DISCONTINUED | OUTPATIENT
Start: 2025-05-09 | End: 2025-05-09

## 2025-05-09 RX ADMIN — MORPHINE SULFATE 2 MG: 2 INJECTION, SOLUTION INTRAMUSCULAR; INTRAVENOUS at 23:55

## 2025-05-09 RX ADMIN — PROPOFOL 250 MG: 10 INJECTION, EMULSION INTRAVENOUS at 12:54

## 2025-05-09 RX ADMIN — PROPOFOL 100 MG: 10 INJECTION, EMULSION INTRAVENOUS at 13:15

## 2025-05-09 RX ADMIN — Medication 10 ML: at 21:00

## 2025-05-09 RX ADMIN — CEFAZOLIN SODIUM 2 G: 2 INJECTION, SOLUTION INTRAVENOUS at 21:53

## 2025-05-09 RX ADMIN — HYDROMORPHONE HYDROCHLORIDE 1 MG: 1 INJECTION, SOLUTION INTRAMUSCULAR; INTRAVENOUS; SUBCUTANEOUS at 13:04

## 2025-05-09 RX ADMIN — ONDANSETRON 4 MG: 2 INJECTION INTRAMUSCULAR; INTRAVENOUS at 18:22

## 2025-05-09 RX ADMIN — CEFAZOLIN SODIUM 2 G: 2 INJECTION, SOLUTION INTRAVENOUS at 05:30

## 2025-05-09 RX ADMIN — OXYCODONE HYDROCHLORIDE 5 MG: 5 TABLET ORAL at 08:48

## 2025-05-09 RX ADMIN — KETOROLAC TROMETHAMINE 30 MG: 30 INJECTION, SOLUTION INTRAMUSCULAR at 13:06

## 2025-05-09 RX ADMIN — ONDANSETRON 4 MG: 2 INJECTION, SOLUTION INTRAMUSCULAR; INTRAVENOUS at 16:13

## 2025-05-09 RX ADMIN — HYDROMORPHONE HYDROCHLORIDE 0.5 MG: 1 INJECTION, SOLUTION INTRAMUSCULAR; INTRAVENOUS; SUBCUTANEOUS at 18:15

## 2025-05-09 RX ADMIN — HYDROMORPHONE HYDROCHLORIDE 0.5 MG: 1 INJECTION, SOLUTION INTRAMUSCULAR; INTRAVENOUS; SUBCUTANEOUS at 17:32

## 2025-05-09 RX ADMIN — Medication 100 MCG: at 14:13

## 2025-05-09 RX ADMIN — LABETALOL HYDROCHLORIDE 5 MG: 5 INJECTION, SOLUTION INTRAVENOUS at 13:23

## 2025-05-09 RX ADMIN — SODIUM CHLORIDE, POTASSIUM CHLORIDE, SODIUM LACTATE AND CALCIUM CHLORIDE: 600; 310; 30; 20 INJECTION, SOLUTION INTRAVENOUS at 12:48

## 2025-05-09 RX ADMIN — HYDROMORPHONE HYDROCHLORIDE 0.5 MG: 1 INJECTION, SOLUTION INTRAMUSCULAR; INTRAVENOUS; SUBCUTANEOUS at 15:34

## 2025-05-09 RX ADMIN — HYDROMORPHONE HYDROCHLORIDE 0.5 MG: 1 INJECTION, SOLUTION INTRAMUSCULAR; INTRAVENOUS; SUBCUTANEOUS at 15:28

## 2025-05-09 RX ADMIN — HYDROMORPHONE HYDROCHLORIDE 0.5 MG: 1 INJECTION, SOLUTION INTRAMUSCULAR; INTRAVENOUS; SUBCUTANEOUS at 17:56

## 2025-05-09 RX ADMIN — HYDROMORPHONE HYDROCHLORIDE 0.5 MG: 1 INJECTION, SOLUTION INTRAMUSCULAR; INTRAVENOUS; SUBCUTANEOUS at 17:04

## 2025-05-09 RX ADMIN — HYDROMORPHONE HYDROCHLORIDE 0.5 MG: 1 INJECTION, SOLUTION INTRAMUSCULAR; INTRAVENOUS; SUBCUTANEOUS at 16:56

## 2025-05-09 RX ADMIN — ACETAMINOPHEN 650 MG: 325 TABLET ORAL at 21:00

## 2025-05-09 RX ADMIN — DOCUSATE SODIUM 100 MG: 100 CAPSULE, LIQUID FILLED ORAL at 08:48

## 2025-05-09 RX ADMIN — LOSARTAN POTASSIUM 100 MG: 100 TABLET, FILM COATED ORAL at 21:00

## 2025-05-09 RX ADMIN — MIDAZOLAM 2 MG: 1 INJECTION INTRAMUSCULAR; INTRAVENOUS at 12:54

## 2025-05-09 RX ADMIN — Medication 10 ML: at 08:50

## 2025-05-09 RX ADMIN — LABETALOL HYDROCHLORIDE 10 MG: 5 INJECTION, SOLUTION INTRAVENOUS at 18:02

## 2025-05-09 RX ADMIN — DEXAMETHASONE SODIUM PHOSPHATE 10 MG: 10 INJECTION INTRAMUSCULAR; INTRAVENOUS at 13:39

## 2025-05-09 RX ADMIN — Medication 6 L/MIN: at 16:50

## 2025-05-09 RX ADMIN — HYDRALAZINE HYDROCHLORIDE 10 MG: 20 INJECTION INTRAMUSCULAR; INTRAVENOUS at 17:19

## 2025-05-09 RX ADMIN — CEFAZOLIN SODIUM 2 G: 2 INJECTION, SOLUTION INTRAVENOUS at 12:58

## 2025-05-09 RX ADMIN — MIDAZOLAM 1 MG: 1 INJECTION INTRAMUSCULAR; INTRAVENOUS at 17:39

## 2025-05-09 RX ADMIN — MORPHINE SULFATE 2 MG: 2 INJECTION, SOLUTION INTRAMUSCULAR; INTRAVENOUS at 21:49

## 2025-05-09 RX ADMIN — HYDROMORPHONE HYDROCHLORIDE 0.5 MG: 1 INJECTION, SOLUTION INTRAMUSCULAR; INTRAVENOUS; SUBCUTANEOUS at 17:19

## 2025-05-09 RX ADMIN — OXYCODONE HYDROCHLORIDE AND ACETAMINOPHEN 500 MG: 500 TABLET ORAL at 08:48

## 2025-05-09 RX ADMIN — ATORVASTATIN CALCIUM 20 MG: 20 TABLET, FILM COATED ORAL at 21:00

## 2025-05-09 RX ADMIN — LABETALOL HYDROCHLORIDE 5 MG: 5 INJECTION, SOLUTION INTRAVENOUS at 14:48

## 2025-05-09 RX ADMIN — CYCLOBENZAPRINE 10 MG: 10 TABLET, FILM COATED ORAL at 21:00

## 2025-05-09 RX ADMIN — LABETALOL HYDROCHLORIDE 10 MG: 5 INJECTION, SOLUTION INTRAVENOUS at 18:20

## 2025-05-09 RX ADMIN — OXYCODONE HYDROCHLORIDE 10 MG: 10 TABLET ORAL at 18:30

## 2025-05-09 RX ADMIN — MORPHINE SULFATE 2 MG: 2 INJECTION, SOLUTION INTRAMUSCULAR; INTRAVENOUS at 19:44

## 2025-05-09 SDOH — HEALTH STABILITY: MENTAL HEALTH: CURRENT SMOKER: 0

## 2025-05-09 ASSESSMENT — PAIN SCALES - GENERAL
PAINLEVEL_OUTOF10: 8
PAINLEVEL_OUTOF10: 7
PAINLEVEL_OUTOF10: 6
PAINLEVEL_OUTOF10: 7
PAINLEVEL_OUTOF10: 0 - NO PAIN
PAINLEVEL_OUTOF10: 9
PAINLEVEL_OUTOF10: 8
PAINLEVEL_OUTOF10: 7
PAINLEVEL_OUTOF10: 7
PAINLEVEL_OUTOF10: 8
PAINLEVEL_OUTOF10: 7
PAIN_LEVEL: 7
PAINLEVEL_OUTOF10: 8
PAINLEVEL_OUTOF10: 5 - MODERATE PAIN
PAINLEVEL_OUTOF10: 7
PAINLEVEL_OUTOF10: 7

## 2025-05-09 ASSESSMENT — PAIN - FUNCTIONAL ASSESSMENT
PAIN_FUNCTIONAL_ASSESSMENT: 0-10
PAIN_FUNCTIONAL_ASSESSMENT: UNABLE TO SELF-REPORT
PAIN_FUNCTIONAL_ASSESSMENT: 0-10

## 2025-05-09 ASSESSMENT — PAIN DESCRIPTION - LOCATION
LOCATION: HAND

## 2025-05-09 ASSESSMENT — COGNITIVE AND FUNCTIONAL STATUS - GENERAL
MOBILITY SCORE: 24
DAILY ACTIVITIY SCORE: 24
MOBILITY SCORE: 24
DAILY ACTIVITIY SCORE: 24

## 2025-05-09 ASSESSMENT — PAIN DESCRIPTION - ORIENTATION
ORIENTATION: RIGHT

## 2025-05-09 ASSESSMENT — PAIN DESCRIPTION - DESCRIPTORS: DESCRIPTORS: ACHING

## 2025-05-09 NOTE — OP NOTE
TENOSYNOVECTOMY, UPPER EXTREMITY (R) Operative Note     Date: 2025  OR Location: [facility] OR    Name: Sundar Gentile, : 1965, Age: 59 y.o., MRN: [id number], Sex: male    Diagnosis  Pre-op Diagnosis      * Surgical wound infection [T81.49XA]     * Wound dehiscence, surgical, subsequent encounter [T81.31XD]     * Other specified disorders of synovium, right wrist [M67.831] Post-op Diagnosis     * Surgical wound infection [T81.49XA]     * Wound dehiscence, surgical, subsequent encounter [T81.31XD]     * Other specified disorders of synovium, right wrist [M67.831]     Procedures  TENOSYNOVECTOMY, UPPER EXTREMITY  03226 - [facility] RAD EXC BURSA SYNVA WRST/F/ARM TDN SHTHS FLXRS    TENOSYNOVECTOMY, UPPER EXTREMITY  39450 - [facility] SECONDARY CLOSURE SURG WOUND/DEHSN XTNSV/COMP    TENOSYNOVECTOMY, UPPER EXTREMITY  40817 - [facility] ADJT TIS TRNS/REARGMT F/C/C/M/N/A/G/H/F 10SQCM/<      Surgeons   Dr. Mayur Williamson MD    Resident/Fellow/Other Assistant:  Ibeth Cowart, Fabienne Wheat    Staff:   Circulator: Lois Haney Person: Manolo  Surgical Assistant: Ibeth Guerra Circulator: Genesis Guerra Scrub: Jerrod Guerra Scrub: Fabienne    Anesthesia Staff: Anesthesiologist: Clementine Ortiz MD  C-AA: GERARD Jackson  Anesthesiologist (Solo in Case): Sb Rabago MD    Procedure Summary  Anesthesia: Anesthesia type not filed in the log.  General  ASA: ASA status not filed in the log.  Estimated Blood Loss: Less than 10 mL  Intra-op Medications:   Administrations occurring from 1255 to 1405 on 25:   Medication Name Total Dose   ceFAZolin (Ancef) 2 g in dextrose (iso)  mL 2 g   dexAMETHasone (Decadron) PF injection 10 mg/mL 10 mg   HYDROmorphone (Dilaudid) injection 1 mg/mL 1 mg   ketorolac (Toradol) injection 30 mg 30 mg   labetalol (Normodyne,Trandate) injection 5 mg   propofol (Diprivan) injection 10 mg/mL 100 mg              Anesthesia Record               Intraprocedure I/O Totals          Intake    LR  infusion 700.00 mL    ceFAZolin (Ancef) 2 g in dextrose (iso)  mL 100.00 mL    Total Intake 800 mL          Specimen:   ID Type Source Tests Collected by Time   1 : RIGHT PALM/WRIST WOUND Tissue DEBRIDEMENT SURGICAL PATHOLOGY EXAM Mayur Williamson MD 5/9/2025 1327   2 : DEBRIDED SUBCUTANEOUS TISSUE Tissue DEBRIDEMENT SURGICAL PATHOLOGY EXAM Mayur Williamson MD 5/9/2025 1329   3 : FLEXOR SYNOVIUM FDS/FDP RIGHT WRIST Tissue SYNOVIUM SURGICAL PATHOLOGY EXAM Mayur Williamson MD 5/9/2025 1329   4 : FLEXOR SYNOVIUM FDS/FDP RIGHT SMALL FINGER Tissue SYNOVIUM SURGICAL PATHOLOGY EXAM Mayur Williamson MD 5/9/2025 1420   5 : FLEXOR SYNOVIUM FDS/FDP RIGHT RING FINGER Tissue SYNOVIUM SURGICAL PATHOLOGY EXAM Mayur Williamson MD 5/9/2025 1430   A : DEEP WRIST WOUND Tissue DEBRIDEMENT TISSUE/WOUND CULTURE/SMEAR Mayur Williamson MD 5/9/2025 1343   B : RIGHT PALM/WRIST TISSUE FOR CULTURE Tissue DEBRIDEMENT TISSUE/WOUND CULTURE/SMEAR Mayur Williamson MD 5/9/2025 1440                   Indications: Sundar Gentile is an 59 y.o. male who is having surgery for Surgical wound infection [T81.49XA]  Wound dehiscence, surgical, subsequent encounter [T81.31XD]  Other specified disorders of synovium, right wrist [M67.831].  He is identified preoperatively by himself.  Risk and benefits surgical invention once again reviewed at length with him and include but are not limited to risk of anesthesia, infection, bleeding, injury to adjacent structures, paralysis, paresthesias, dysfunction, deformity, further dysfunction, further deformity, scarring, recurrence, nonresolution of symptoms, possible need for further surgical interventions among others.  We have discussed the preoperative course, the operative procedure, the postoperative course at length.  We have had a lengthy discussion concerning his current clinical situation and treatment options.  He had previous repeat right carpal tunnel release with hypothenar fat pad flap closure on  2/21/2025 and subsequently developed surgical wound infection on 3/9/2025 with Enterobacter cloacae.  He was treated with wound care and oral Cipro and Bactrim DS antibiotics and did not demonstrate significant improvement.  He was then admitted to the hospital and had IV antibiotic therapy and further surgical debridement on 3/31/2025.  After that surgery he had 4 weeks of IV home ertapenem antibiotic therapy he then had surgical wound dehiscence and infection on 5/1/2025.  He was admitted to [facility] on 5/2/2025 and was treated for staph infection in the right hand/wrist/forearm with IV Ancef for several days.  He is continued open wound and infection resulted in admission on 5/8/2025, PICC line placement, ID consult, IV antibiotics and today is arranged for further surgical intervention for exploration, irrigation, excisional debridement devitalized skin subcutaneous tissue, possible radical flexor synovectomy of flexor tendons, possible excision of ulnar/mid palm/radial bursa of the right hand/wrist/forearm with possible complex closure requiring transposition/rotational flap closure.  He understands that this is significant infection and clinical situation that may necessitate further significant care both medically and surgically.  He understands very well all of our discussions.  He wished to proceed with surgical intervention.  The consent is obtained.  The right hand is appropriate identified and marked preoperatively with marking pen.  He received preoperative IV antibiotics.  SCDs are in place.  The preoperative safety checklist was performed.  He was then taken the operating room placed in supine position on the operating table.    The patient was seen in the preoperative area. The risks, benefits, complications, treatment options, non-operative alternatives, expected recovery and outcomes were discussed with the patient. The possibilities of reaction to medication, pulmonary aspiration,  injury to surrounding structures, bleeding, recurrent infection, the need for additional procedures, failure to diagnose a condition, and creating a complication requiring transfusion or operation were discussed with the patient. The patient concurred with the proposed plan, giving informed consent.  The site of surgery was properly noted/marked if necessary per policy. The patient has been actively warmed in preoperative area. Preoperative antibiotics have been ordered and given within 1 hours of incision. Venous thrombosis prophylaxis have been ordered including bilateral sequential compression devices    Procedure Details:    Preoperative diagnosis: Recalcitrant/recurrent right volar palm/wrist/forearm surgical wound infection and dehiscence.    Postoperative diagnosis: Recalcitrant/recurrent right volar palm/wrist/forearm surgical wound infection and dehiscence with severe synovitis of all of the flexor tendons at the wrist including the flexor pollicis longus tendon, the for FDS and for FDP tendons to the index, middle, ring, small fingers with involvement of the ulnar bursa, the mid palm bursa, and the radial bursa with severe bursitis/synovitis, and complex chronic wound.    Operative procedure: Exploration volar right palm/wrist/forearm wound with extension and proximal and distal directions, excisional debridement of devitalized skin and subcutaneous tissue, neurolysis of the median nerve from scar tissue, radical flexor synovectomy of the flexor pollicis longus tendon, and the 4 FDS and 4 FDP flexor tendons to the index, middle, ring, small fingers at the wrist and palm level, exploration and excision ulnar bursa, mid palm bursa, and radial bursa proliferative bursa/synovium copious irrigation with saline solution and complex secondary closure with rotational/transposition flap coverage over a Penrose drain.    Attending Surgeon: Dr. Mayur Williamson MD    Assistant: Ibeth Cowart and Fabienne  [facility]    Anesthesia: General    Estimated blood loss: Less than 10 cc    Condition: Stable    Complications: None    Specimen: Sent to pathology for further evaluation    Details of the procedure:  He is identified preoperatively by himself.  Risk and benefits surgical invention once again reviewed at length with him and include but are not limited to risk of anesthesia, infection, bleeding, injury to adjacent structures, paralysis, paresthesias, dysfunction, deformity, further dysfunction, further deformity, scarring, recurrence, nonresolution of symptoms, possible need for further surgical interventions among others.  We have discussed the preoperative course, the operative procedure, the postoperative course at length.  We have had a lengthy discussion concerning his current clinical situation and treatment options.  He had previous repeat right carpal tunnel release with hypothenar fat pad flap closure on 2/21/2025 and subsequently developed surgical wound infection on 3/9/2025 with Enterobacter cloacae.  He was treated with wound care and oral Cipro and Bactrim DS antibiotics and did not demonstrate significant improvement.  He was then admitted to the hospital and had IV antibiotic therapy and further surgical debridement on 3/31/2025.  After that surgery he had 4 weeks of IV home ertapenem antibiotic therapy he then had surgical wound dehiscence and infection on 5/1/2025.  He was admitted to [facility] on 5/2/2025 and was treated for staph infection in the right hand/wrist/forearm with IV Ancef for several days.  He is continued open wound and infection resulted in admission on 5/8/2025, PICC line placement, ID consult, IV antibiotics and today is arranged for further surgical intervention for exploration, irrigation, excisional debridement devitalized skin subcutaneous tissue, possible radical flexor synovectomy of flexor tendons, possible excision of ulnar/mid palm/radial bursa of the  right hand/wrist/forearm with possible complex closure requiring transposition/rotational flap closure.  He understands that this is significant infection and clinical situation that may necessitate further significant care both medically and surgically.  He understands very well all of our discussions.  He wished to proceed with surgical intervention.  The consent is obtained.  The right hand is appropriate identified and marked preoperatively with marking pen.  He received preoperative IV antibiotics.  SCDs are in place.  The preoperative safety checklist was performed.  He was then taken the operating room placed in supine position on the operating table.  After adequate duction general anesthesia the right upper extremity was cleaned prepped and draped in usual sterile fashion and a proximal upper extremity tourniquet was applied.  The entire procedure performed under loupe magnification.  The timeout procedure was performed.  Right hand was repositioned on the hand table and retracted with the aluminum hand retracting system.  Wound was further identified as complex 2 cm in diameter wound the mid aspect of the palm wrist juncture.  The appropriate incision sites were marked with a marking pen to incorporate excision of the wound and possible rotational/transposition flap closure.  The right upper extremity is elevated and the proximal upper extremity tourniquet was then inflated.  The Esmarch was not employed to exsanguinate the right upper extremity given his history of recent staph infection.  Right hand/wrist/forearm was then repositioned on the hand table and retracted with the aluminum hand retracting system.  The appropriate incisions were then made with scalpel.  Subcutaneous tissues dissected with tenotomy scissors.  The chronic wound was circumferentially dissected excised removed and passed off table to be sent to pathology for further evaluation.  The wound was then extended in both proximal and  distal directions with the appropriate incisions with the scalpel.  Excellent hemostasis maintained with electrocautery.  Further dissection with tenotomy scissors was performed.  The appropriate flaps were dissected, raised, elevated, and retracted.  Beginning proximally there is significant scar tissue involving all structures and the median nerve is identified at its proximal most exposed aspect and then dissected through the scar tissue and significant proliferative synovium.  Once neurolysis of the median nerve was accomplished and it was freed of all scar tissue it was then carefully gently retracted to maintain it free from injury throughout the procedure.  The flexor tendons were encased in a significant and severe amount of proliferative synovium.  This is grasped and dissected through the wrist and mid palm area under direct vision with loupe magnification.  Each tendon was severely encased and involved with this proliferative synovium and this was performed carefully gently meticulously under direct vision with loupe magnification identifying each tendon proximally and dissecting as much of the proliferative synovium and scar tissue from the flexor pollicis longus tendon and the 4 FDS tendons and 4 FDP tendons to the index, middle, ring, small fingers.  Once the radical flexor synovectomy of these 9 tendons was performed further exploration and dissection continued and the ulnar bursa, mid palm bursa and radial bursa were entered and culture swab was obtained as well as significant bursal tissue/proliferative synovium excised from the ulnar bursa, the mid palm bursa and the radial bursa.  Some of this tissue was sent for Gram stain and cultures as well.  Hemostasis maintained with bipolar electrocautery and electrocautery.  The ulnar artery and palmar arch were further identified and carefully gently meticulously dissected and maintained free from injury.  The flexor synovium of the FDS and FDP tendons  was also identified distally in the palm to the ring finger and small finger and again.  To be very thickened and proliferative.  This was also dissected from the FDS and FDP tendons at this level as well.  The ulnar artery and nerve were identified and briefly dissected in the palm and remained intact.  There is a significant amount of scar tissue and dissection was performed very slowly and carefully in an effort to identify and maintain structures free from injury.  Copious irrigation with 6 L of saline solution was performed.  The proximal upper extremity tourniquet was then released.  Total tourniquet time was 108 minutes.  Excellent hemostasis was maintained with bipolar electrocautery and electrocautery.  Further copious irrigation with saline solution was performed.  Excellent hemostasis is demonstrated.  1/4 inch Penrose drain was cut in half and placed through the ulnar bursa and extending and exiting distally at the level of the small finger FDS/FDP A1 pulley area as well as the proximal and exiting in the distal forearm area.  The skin and subcutaneous tissue were further identified.  The appropriate rotational/transposition flap was designed and marked with a marking pen.  The appropriate incisions and back cuts were then made with the scalpel.  Excellent hemostasis is maintained with electrocautery.  Further copious irrigation with saline solution was performed.  The previous fat pad flap was secured to the radial aspect of the carpal tunnel with 1 horizontal mattress suture of 3-0 Vicryl that exited the skin so that there are no internal sutures.  This allowed protection of the median nerve.  This suture may then be removed at any point in time if needed.  Excellent hemostasis is demonstrated.  The flap is rotated into position and secured into position with several simple interrupted and horizontal mattress sutures of 3-0 nylon and 4-0 nylon.  The remaining operative site was closed with several  simple interrupted and intermittent horizontal mattress sutures of 3-0 nylon and 4-0 nylon.  The drain ends were sutured to each other with a 4-0 nylon suture.  The right hand wrist and forearm were further cleaned and dried.  All skin flaps appeared pink, warm, capillary refill 1 to 2 seconds.  The operative site area was then injected in local circumferential fashion with 15 cc of one-to-one mixture of 2% plain lidocaine solution 0.25% plain Marcaine solution for prolonged postoperative pain relief.  Bacitracin Xeroform and appropriate bulky protective immobilizing sterile dressing and plaster splint were then fashioned and applied.  Distal tips of all 5 digits remained pink, warm, capillary refill 1 to 2 seconds after dressing and splint application.  He tolerated procedure very well.  He woke from anesthesia without difficulty and is transferred to the recovery room in stable condition.    Dr. Mayur Williamson MD    Complications:  None; patient tolerated the procedure well.    Disposition: PACU - hemodynamically stable.  Condition: stable         Drains and/or Catheters:   Open Drain Right Hand (Active)   Quarter-inch Penrose drain in place    Tourniquet Times:     Total Tourniquet Time Documented:  Arm - Upper (Right) - 108 minutes  Total: Arm - Upper (Right) - 108 minutes      Findings: Severe proliferative synovium and bursitis involving the right hand/wrist/forearm      Attending Attestation: I was present and scrubbed for the entire procedure.    Mayur Williamson  Phone Number: [phone]

## 2025-05-09 NOTE — ANESTHESIA PROCEDURE NOTES
Airway  Date/Time: 5/9/2025 12:56 PM  Reason: elective    Airway not difficult    Staffing  Performed: attending   Authorized by: Sb Rabago MD    Performed by: Sb Rabago MD  Patient location during procedure: OR    Patient Condition  Indications for airway management: airway protection  Patient position: sniffing  MILS maintained throughout  Sedation level: deep     Final Airway Details   Preoxygenated: yes  Final airway type: supraglottic airway  Successful airway: unique  Size: 4   Ventilation between attempts: none and spontaneous  Number of attempts at approach: 1  Number of other approaches attempted: 0

## 2025-05-09 NOTE — PROGRESS NOTES
05/09/25 1056   Discharge Planning   Home or Post Acute Services In home services   Type of Home Care Services Home nursing visits   Expected Discharge Disposition Home Health  ([facility])   Patient Choice   Patient / Family choosing to utilize agency / facility established prior to hospitalization Yes   Intensity of Service   Intensity of Service >30 min     Will need an internal HHC referral placed by MD in Epic for patient to resume his home infusion with [facility] when he discharges.

## 2025-05-09 NOTE — PROGRESS NOTES
" INPATIENT PROGRESS NOTES    PATIENT NAME: Sundar Gentile    MRN: [id number]  SERVICE DATE:  5/9/2025   SERVICE TIME:  3:47 PM    SIGNATURE: Jey Pereira MD      ASSESSMENT :   -Right hand surgical wound infection  -Recent culture grew MSSA  -Previous wound culture for Enterobacter cloacae treated with approximately 4 weeks of ertapenem  -Status post right hand carpal tunnel surgery on 2/21  -History of hypertension, hyperlipidemia     PLAN:  - Continue cefazolin through 6/4  - For surgery today   - Closely monitor for antibiotics side effects including rash, Diarrhea/CDI, thrombocytopenia, KAI, etc.  - Follow up with me on 5/22              SUBJECTIVE  Afebrile  No events overnight       OBJECTIVE  PHYSICAL EXAM:   Patient Vitals for the past 24 hrs:   BP Temp Temp src Pulse Resp SpO2   05/09/25 0800 146/90 37 °C (98.6 °F) Temporal 69 18 96 %   05/09/25 0000 108/74 36 °C (96.8 °F) Temporal 78 18 96 %   05/08/25 2000 140/76 36.5 °C (97.7 °F) Temporal 80 18 98 %   05/08/25 1600 (!) 136/93 36.3 °C (97.3 °F) Temporal 74 16 93 %       In OR      Labs:  Lab Results   Component Value Date    WBC 4.3 (L) 05/08/2025    HGB 12.6 (L) 05/08/2025    HCT 38.7 (L) 05/08/2025    MCV 94 05/08/2025     05/08/2025     Lab Results   Component Value Date    GLUCOSE 100 (H) 05/08/2025    CALCIUM 8.8 05/08/2025     05/08/2025    K 3.5 05/08/2025    CO2 27 05/08/2025     05/08/2025    BUN 12 05/08/2025    CREATININE 0.93 05/08/2025   ESR: --  Lab Results   Component Value Date    SEDRATE <1 09/06/2024     Lab Results   Component Value Date    CRP <3.0 04/17/2025   No results found for: \"ALT\", \"AST\", \"GGT\", \"ALKPHOS\", \"BILITOT\"      DATA:   Diagnostic tests reviewed for today's visit:    Labs this admission reviewed  Imagings this admission reviewed  Cultures: Reviewed        Jey Pereira MD.   Infectious Disease Attending            This note was partially created using voice recognition software and is inherently " "subject to errors including those of syntax and \"sound-alike\" substitutions which may escape proofreading. In such instances, original meaning may be extrapolated by contextual derivation       "

## 2025-05-09 NOTE — ANESTHESIA POSTPROCEDURE EVALUATION
Patient: Sundar Gentile    Procedure Summary       Date: 05/09/25 Room / Location: [facility] OR 05 / [facility] OR    Anesthesia Start: 1247 Anesthesia Stop: 1653    Procedure: TENOSYNOVECTOMY, UPPER EXTREMITY (Right) Diagnosis:       Surgical wound infection      Wound dehiscence, surgical, subsequent encounter      Other specified disorders of synovium, right wrist      (Surgical wound infection [T81.49XA])      (Wound dehiscence, surgical, subsequent encounter [T81.31XD])      (Other specified disorders of synovium, right wrist [M67.831])    Surgeons: Mayur Williamson MD Responsible Provider: Clementine Ortiz MD    Anesthesia Type: general ASA Status: 3            Anesthesia Type: general    Vitals Value Taken Time   /80 05/09/25 18:23   Temp 36.3 °C (97.3 °F) 05/09/25 16:52   Pulse 73 05/09/25 18:28   Resp 11 05/09/25 18:28   SpO2 100 % 05/09/25 18:28   Vitals shown include unfiled device data.    Anesthesia Post Evaluation    Patient location during evaluation: PACU  Patient participation: complete - patient participated  Level of consciousness: awake and alert  Pain score: 7  Pain management: inadequate (Required escalation of the opioids)  Airway patency: patent  Cardiovascular status: hypertensive (Required treatment)  Respiratory status: nonlabored ventilation  Hydration status: euvolemic  Postoperative Nausea and Vomiting: none        No notable events documented.

## 2025-05-09 NOTE — ANESTHESIA PREPROCEDURE EVALUATION
Patient: Sundar Gentile    Procedure Information       Anesthesia Start Date/Time: 05/09/25 7547    Procedure: TENOSYNOVECTOMY, UPPER EXTREMITY (Right) - 2 hours    Location: [facility] OR  / [facility] OR    Surgeons: Mayur Williamson MD            Relevant Problems   Cardiac   (+) Hyperlipidemia   (+) Hypertension      Neuro   (+) Narcotic dependence, in remission (Multi)   (+) Right carpal tunnel syndrome   (+) Right cervical radiculopathy   (+) Situational depression      GI   (+) Hiatal hernia      Musculoskeletal   (+) Right carpal tunnel syndrome      HEENT   (+) Sinusitis      ID   (+) Surgical wound infection   (+) Wound infection   (+) Wound infection after surgery       Clinical information reviewed:   Tobacco  Allergies  Meds  Problems  Med Hx  Surg Hx   Fam Hx  Soc   Hx        NPO Detail:  No data recorded     Physical Exam    Airway  Mallampati: II  TM distance: >3 FB  Neck ROM: full  Mouth opening: 3 or more finger widths     Cardiovascular   Rhythm: regular  Rate: abnormal     Dental - normal exam     Pulmonary - normal examBreath sounds clear to auscultation     Abdominal (+) obese  Abdomen: soft  Bowel sounds: normal           Anesthesia Plan    History of general anesthesia?: yes  History of complications of general anesthesia?: no    ASA 3     general     The patient is not a current smoker.  Patient was previously instructed to abstain from smoking on day of procedure.  Patient did not smoke on day of procedure.  Education provided regarding risk of obstructive sleep apnea.  intravenous induction   Postoperative pain plan includes opioids.  Anesthetic plan and risks discussed with patient.  Use of blood products discussed with patient who consented to blood products.    Plan discussed with CAA.

## 2025-05-09 NOTE — PROGRESS NOTES
"Sundar Gentile is a 59 y.o. male on day 2 of admission presenting with Wound infection.    Subjective   He is seen in preop before surgery.  He denies any new problems or complaints.  He states that he is a little bit nervous.       Objective     Physical Exam  Exam is unchanged from yesterday.  Last Recorded Vitals  Blood pressure (!) 194/89, pulse 71, temperature 36.3 °C (97.3 °F), resp. rate 13, height 1.803 m (5' 10.98\"), weight 108 kg (237 lb 3.4 oz), SpO2 99%.  Intake/Output last 3 Shifts:  I/O last 3 completed shifts:  In: 400 (3.7 mL/kg) [IV Piggyback:400]  Out: - (0 mL/kg)   Weight: 107.6 kg     Relevant Results    Scheduled medications  Scheduled Medications[1]  Continuous medications  Continuous Medications[2]  PRN medications  PRN Medications[3]    Labs  No results found for this or any previous visit (from the past 24 hours).     Imaging  Imaging  XR chest 1 view  Result Date: 5/7/2025  1.  No evidence of acute cardiopulmonary process. Left-sided PICC line with the tip in the atriocaval junction.       MACRO: None   Signed by: Josué Nunez 5/7/2025 6:50 PM Dictation workstation:   [id number]    CT radius ulna right w IV contrast  Result Date: 5/3/2025  IMPRESSION: Status post carpal tunnel surgery with findings suspicious for infectious ulnar bursitis, including persistent increased fluid and postcontrast enhancement in the common flexor synovial sheath (ulnar bursa) extending towards the fifth flexor digitorum tendon sheath. No CT findings of acute osteomyelitis. Findings suggestive of scapholunate sprain. Degenerative arthritis as described : JAYSON   Transcribe Date/Time: May  3 2025  8:42A Dictated by : MYAH PROCTOR MD This examination was interpreted and the report reviewed and electronically signed by: MYAH PROCTOR MD on May  3 2025  9:06AM  EST    CT hand right w IV contrast  Result Date: 5/3/2025  IMPRESSION: Status post carpal tunnel surgery with findings suspicious for " infectious ulnar bursitis, including persistent increased fluid and postcontrast enhancement in the common flexor synovial sheath (ulnar bursa) extending towards the fifth flexor digitorum tendon sheath. No CT findings of acute osteomyelitis. Findings suggestive of scapholunate sprain. Degenerative arthritis as described : JAYSON   Transcribe Date/Time: May  3 2025  8:42A Dictated by : MYAH PROCTOR MD This examination was interpreted and the report reviewed and electronically signed by: MYAH PROCTOR MD on May  3 2025  9:06AM  EST      Cardiology, Vascular, and Other Imaging  Bedside PIC Imaging  Result Date: 5/7/2025  These images are not reportable by radiology and will not be interpreted by  Radiologists.    CT radius ulna right w IV contrast  Result Date: 5/3/2025  * * *Final Report* * * DATE OF EXAM: May  2 2025  7:36PM   [facility] [place]   0044  -  CT FOREARM W IVCON RT  / ACCESSION #  [id number] PROCEDURE REASON: Osteomyelitis suspected, forearm, no prior imaging      * * * * Physician Interpretation * * * *  REASON FOR STUDY:  Soft tissue infection suspected, hand, xray done . TECHNOLOGIST-PROVIDED HISTORY: Images approved by Alexx Salter Need to get forearm in field of view if possible TECHNIQUE: CT HAND W IVCON RT, CT FOREARM W IVCON RT CT Radiation dose: Integrated Dose-length product (DLP) for this visit =   613 mGy*cm. CT Dose Reduction Employed: Automated exposure control (AEC) Contrast:  IV administration of 100 ml of Omnipaque 350 COMPARISON: Radiograph of the RIGHT hand and RIGHT wrist dated 5/2/2025 RESULT: Skin irregularity and thickening is noted along the volar aspect, with area of fluid extending to the carpal tunnel (series 4 image 86 measuring 2.4 x 1.0 cm  in cross-section by 5.8 cm long. The amount of fluid, probably in the common flexor synovial sheath (ulnar bursa) is disproportionate to what would be expected for recent surgery in March. The fluid extends around the carpal tunnel,  with flexor rectinacular and synovial thickening and postcontrast enhancement as seen on series 4 image 70 and both the fluid and slightly increased postcontrast enhancement are seen to extend towards fifth digit flexor digitorum tendon sheath, which contains slightly increased fluid. Note that CT technique is suboptimal for assessment of the soft tissue, but within the parameters of CT technique, toxic tenosynovitis is suspected in the fifth flexor digitorum tendon sheath. The the flexor retinaculum fluid extends proximally to the level of the distal radius around the flexor pollicis longus tendon. The tendons themselves are otherwise intact. There is no separate drainable fluid collection aside from the increased fluid in the flexor retinaculum extending to the skin as described above. Moderate first carpometacarpal osteoarthritis is noted with joint space narrowing. Degenerative changes are also noted in the first metacarpophalangeal joint. Cystic changes in the trapezoid, capitate, and lunate are likely intraosseous ganglia or degenerative changes. Mild scaphotrapeziotrapezoid degenerative arthritis and radiocarpal degenerative arthritis are present. The median nerve is not well seen. The scapholunate interval is prominent measuring 4 mm, suspicious for scapholunate sprain, although the ligament itself cannot be visualized on CT. No other significant abnormality in the forearm. Localizer images: No additional findings. No other significant abnormality is seen. ---------------------------------------------    IMPRESSION: Status post carpal tunnel surgery with findings suspicious for infectious ulnar bursitis, including persistent increased fluid and postcontrast enhancement in the common flexor synovial sheath (ulnar bursa) extending towards the fifth flexor digitorum tendon sheath. No CT findings of acute osteomyelitis. Findings suggestive of scapholunate sprain. Degenerative arthritis as described  : JAYSON   Transcribe Date/Time: May  3 2025  8:42A Dictated by : MYAH PROCTOR MD This examination was interpreted and the report reviewed and electronically signed by: MYAH PROCTOR MD on May  3 2025  9:06AM  EST    CT hand right w IV contrast  Result Date: 5/3/2025  * * *Final Report* * * DATE OF EXAM: May  2 2025  7:36PM   [facility] [place]   0046  -  CT HAND W IVCON RT  / ACCESSION #  [id number] PROCEDURE REASON: Soft tissue infection suspected, hand, xray done      * * * * Physician Interpretation * * * *  REASON FOR STUDY:  Soft tissue infection suspected, hand, xray done . TECHNOLOGIST-PROVIDED HISTORY: Images approved by Alexx Salter Need to get forearm in field of view if possible TECHNIQUE: CT HAND W IVCON RT, CT FOREARM W IVCON RT CT Radiation dose: Integrated Dose-length product (DLP) for this visit =   613 mGy*cm. CT Dose Reduction Employed: Automated exposure control (AEC) Contrast:  IV administration of 100 ml of Omnipaque 350 COMPARISON: Radiograph of the RIGHT hand and RIGHT wrist dated 5/2/2025 RESULT: Skin irregularity and thickening is noted along the volar aspect, with area of fluid extending to the carpal tunnel (series 4 image 86 measuring 2.4 x 1.0 cm  in cross-section by 5.8 cm long. The amount of fluid, probably in the common flexor synovial sheath (ulnar bursa) is disproportionate to what would be expected for recent surgery in March. The fluid extends around the carpal tunnel, with flexor rectinacular and synovial thickening and postcontrast enhancement as seen on series 4 image 70 and both the fluid and slightly increased postcontrast enhancement are seen to extend towards fifth digit flexor digitorum tendon sheath, which contains slightly increased fluid. Note that CT technique is suboptimal for assessment of the soft tissue, but within the parameters of CT technique, toxic tenosynovitis is suspected in the fifth flexor digitorum tendon sheath. The the flexor retinaculum fluid extends  proximally to the level of the distal radius around the flexor pollicis longus tendon. The tendons themselves are otherwise intact. There is no separate drainable fluid collection aside from the increased fluid in the flexor retinaculum extending to the skin as described above. Moderate first carpometacarpal osteoarthritis is noted with joint space narrowing. Degenerative changes are also noted in the first metacarpophalangeal joint. Cystic changes in the trapezoid, capitate, and lunate are likely intraosseous ganglia or degenerative changes. Mild scaphotrapeziotrapezoid degenerative arthritis and radiocarpal degenerative arthritis are present. The median nerve is not well seen. The scapholunate interval is prominent measuring 4 mm, suspicious for scapholunate sprain, although the ligament itself cannot be visualized on CT. No other significant abnormality in the forearm. Localizer images: No additional findings. No other significant abnormality is seen. ---------------------------------------------    IMPRESSION: Status post carpal tunnel surgery with findings suspicious for infectious ulnar bursitis, including persistent increased fluid and postcontrast enhancement in the common flexor synovial sheath (ulnar bursa) extending towards the fifth flexor digitorum tendon sheath. No CT findings of acute osteomyelitis. Findings suggestive of scapholunate sprain. Degenerative arthritis as described : JAYSON   Transcribe Date/Time: May  3 2025  8:42A Dictated by : MYAH PROCTOR MD This examination was interpreted and the report reviewed and electronically signed by: MYAH PROCTOR MD on May  3 2025  9:06AM  EST           Assessment/Plan   Assessment & Plan  Wound infection    Surgical wound infection    Wound dehiscence, surgical, subsequent encounter    Other specified disorders of synovium, right wrist    He presents for surgery today.  Risks and benefits of surgery once again reviewed at length with him and  include but are not limited to risk of anesthesia, infection, bleeding, injury to adjacent structures, paralysis, paresthesias, dysfunction, deformity, further dysfunction, further deformity scarring, recurrence, nonresolution of symptoms, possible need for further surgical interventions including but not limited to further wound reconstruction.  He understands that this is a significant process send he has had significant infections with significant wound complications and this may require further treatment and care that could be significant for him.  In a very lengthy discussion regarding his current clinical situation and treatment options.  We did discuss possible axillary block and he does not wish to have axillary block performed today as he states it did not work the last time.  He understands all of our discussions.  He wished to proceed with surgical intervention.  The consent is obtained.  The right hand is appropriate identified and marked preoperative with the marking pen.  He did receive IV antibiotics, SCDs are in place.  The preoperative safety checklist is performed and he will be taken to the operating room.  He will continue in the hospital after surgery for further IV antibiotic therapy, pain control and wound care by me.       I spent 30 minutes in the professional and overall care of this patient.      Mayur Williamson MD           [1] acetaminophen, 975 mg, oral, Once  [Transfer Hold] ascorbic acid, 500 mg, oral, Daily  [Transfer Hold] atorvastatin, 20 mg, oral, Nightly  [Transfer Hold] calcium carbonate, 1 tablet, oral, Daily  [Transfer Hold] ceFAZolin, 2 g, intravenous, q8h  [Transfer Hold] heparin flush, 0.5 mL, intravenous, Daily  [Transfer Hold] losartan, 100 mg, oral, Nightly  oxygen, , inhalation, Continuous - 02/gases  [Transfer Hold] pantoprazole, 40 mg, oral, Daily before breakfast  [Transfer Hold] sodium chloride 0.9%, 10 mL, intravenous, BID    [2] lactated Ringer's, 125 mL/hr, Last  Rate: 125 mL/hr (05/09/25 1650)    [3] PRN medications: [Transfer Hold] acetaminophen **OR** [Transfer Hold] acetaminophen **OR** [Transfer Hold] acetaminophen, acetaminophen, albuterol, [Transfer Hold] alteplase, [Transfer Hold] amphetamine-dextroamphetamine, [Transfer Hold] cyclobenzaprine, [Transfer Hold] docusate sodium, hydrALAZINE, HYDROmorphone, HYDROmorphone, HYDROmorphone, labetaloL, midazolam, ondansetron, oxyCODONE, [Transfer Hold] oxyCODONE, oxyCODONE-acetaminophen, prochlorperazine

## 2025-05-09 NOTE — NURSING NOTE
0848 pt mediated for 7/10 right hand pain. Pt up to chair. Maintained npo.   1040 pt taken off the floor to OR  1900 pt returned to room. Right arm elevated with pillows. Wife at bedside. Dressing dry and intact.

## 2025-05-10 DIAGNOSIS — N40.1 BENIGN PROSTATIC HYPERPLASIA WITH LOWER URINARY TRACT SYMPTOMS, SYMPTOM DETAILS UNSPECIFIED: ICD-10-CM

## 2025-05-10 PROCEDURE — 2500000002 HC RX 250 W HCPCS SELF ADMINISTERED DRUGS (ALT 637 FOR MEDICARE OP, ALT 636 FOR OP/ED)

## 2025-05-10 PROCEDURE — 1100000001 HC PRIVATE ROOM DAILY

## 2025-05-10 PROCEDURE — 2500000004 HC RX 250 GENERAL PHARMACY W/ HCPCS (ALT 636 FOR OP/ED)

## 2025-05-10 PROCEDURE — 2500000001 HC RX 250 WO HCPCS SELF ADMINISTERED DRUGS (ALT 637 FOR MEDICARE OP)

## 2025-05-10 RX ORDER — IBUPROFEN 600 MG/1
600 TABLET, FILM COATED ORAL EVERY 6 HOURS PRN
Status: DISCONTINUED | OUTPATIENT
Start: 2025-05-10 | End: 2025-05-13 | Stop reason: HOSPADM

## 2025-05-10 RX ORDER — MORPHINE SULFATE 4 MG/ML
4 INJECTION, SOLUTION INTRAMUSCULAR; INTRAVENOUS EVERY 2 HOUR PRN
Status: DISCONTINUED | OUTPATIENT
Start: 2025-05-10 | End: 2025-05-13 | Stop reason: HOSPADM

## 2025-05-10 RX ORDER — MORPHINE SULFATE 2 MG/ML
2 INJECTION, SOLUTION INTRAMUSCULAR; INTRAVENOUS ONCE
Status: COMPLETED | OUTPATIENT
Start: 2025-05-10 | End: 2025-05-10

## 2025-05-10 RX ADMIN — OXYCODONE HYDROCHLORIDE 10 MG: 10 TABLET ORAL at 14:53

## 2025-05-10 RX ADMIN — CEFAZOLIN SODIUM 2 G: 2 INJECTION, SOLUTION INTRAVENOUS at 14:54

## 2025-05-10 RX ADMIN — HEPARIN 50 UNITS: 100 SYRINGE at 08:25

## 2025-05-10 RX ADMIN — OXYCODONE HYDROCHLORIDE 10 MG: 10 TABLET ORAL at 08:27

## 2025-05-10 RX ADMIN — CEFAZOLIN SODIUM 2 G: 2 INJECTION, SOLUTION INTRAVENOUS at 21:33

## 2025-05-10 RX ADMIN — MORPHINE SULFATE 2 MG: 2 INJECTION, SOLUTION INTRAMUSCULAR; INTRAVENOUS at 04:21

## 2025-05-10 RX ADMIN — MORPHINE SULFATE 2 MG: 2 INJECTION, SOLUTION INTRAMUSCULAR; INTRAVENOUS at 15:34

## 2025-05-10 RX ADMIN — MORPHINE SULFATE 4 MG: 4 INJECTION, SOLUTION INTRAMUSCULAR; INTRAVENOUS at 18:52

## 2025-05-10 RX ADMIN — MORPHINE SULFATE 2 MG: 2 INJECTION, SOLUTION INTRAMUSCULAR; INTRAVENOUS at 11:19

## 2025-05-10 RX ADMIN — CEFAZOLIN SODIUM 2 G: 2 INJECTION, SOLUTION INTRAVENOUS at 06:26

## 2025-05-10 RX ADMIN — Medication 10 ML: at 21:43

## 2025-05-10 RX ADMIN — MORPHINE SULFATE 2 MG: 2 INJECTION, SOLUTION INTRAMUSCULAR; INTRAVENOUS at 06:26

## 2025-05-10 RX ADMIN — MORPHINE SULFATE 2 MG: 2 INJECTION, SOLUTION INTRAMUSCULAR; INTRAVENOUS at 16:28

## 2025-05-10 RX ADMIN — Medication 10 ML: at 08:28

## 2025-05-10 RX ADMIN — DOCUSATE SODIUM 100 MG: 100 CAPSULE, LIQUID FILLED ORAL at 08:28

## 2025-05-10 RX ADMIN — OXYCODONE HYDROCHLORIDE 10 MG: 10 TABLET ORAL at 21:33

## 2025-05-10 RX ADMIN — PANTOPRAZOLE SODIUM 40 MG: 20 TABLET, DELAYED RELEASE ORAL at 06:26

## 2025-05-10 RX ADMIN — MORPHINE SULFATE 2 MG: 2 INJECTION, SOLUTION INTRAMUSCULAR; INTRAVENOUS at 02:05

## 2025-05-10 RX ADMIN — CYCLOBENZAPRINE 10 MG: 10 TABLET, FILM COATED ORAL at 14:20

## 2025-05-10 RX ADMIN — MORPHINE SULFATE 2 MG: 2 INJECTION, SOLUTION INTRAMUSCULAR; INTRAVENOUS at 13:31

## 2025-05-10 RX ADMIN — MORPHINE SULFATE 4 MG: 4 INJECTION, SOLUTION INTRAMUSCULAR; INTRAVENOUS at 22:32

## 2025-05-10 RX ADMIN — LOSARTAN POTASSIUM 100 MG: 100 TABLET, FILM COATED ORAL at 21:33

## 2025-05-10 RX ADMIN — OXYCODONE HYDROCHLORIDE AND ACETAMINOPHEN 500 MG: 500 TABLET ORAL at 08:27

## 2025-05-10 RX ADMIN — CYCLOBENZAPRINE 10 MG: 10 TABLET, FILM COATED ORAL at 08:27

## 2025-05-10 RX ADMIN — OXYCODONE HYDROCHLORIDE 10 MG: 10 TABLET ORAL at 00:57

## 2025-05-10 RX ADMIN — ATORVASTATIN CALCIUM 20 MG: 20 TABLET, FILM COATED ORAL at 21:33

## 2025-05-10 ASSESSMENT — PAIN DESCRIPTION - LOCATION
LOCATION: HAND
LOCATION: ARM
LOCATION: ARM
LOCATION: HAND
LOCATION: HAND
LOCATION: ARM
LOCATION: HAND
LOCATION: ARM
LOCATION: HAND
LOCATION: HAND

## 2025-05-10 ASSESSMENT — PAIN SCALES - GENERAL
PAINLEVEL_OUTOF10: 6
PAINLEVEL_OUTOF10: 5 - MODERATE PAIN
PAINLEVEL_OUTOF10: 6
PAINLEVEL_OUTOF10: 7
PAINLEVEL_OUTOF10: 9
PAINLEVEL_OUTOF10: 5 - MODERATE PAIN
PAINLEVEL_OUTOF10: 6
PAINLEVEL_OUTOF10: 9
PAINLEVEL_OUTOF10: 10 - WORST POSSIBLE PAIN
PAINLEVEL_OUTOF10: 6
PAINLEVEL_OUTOF10: 8
PAINLEVEL_OUTOF10: 7
PAINLEVEL_OUTOF10: 10 - WORST POSSIBLE PAIN
PAINLEVEL_OUTOF10: 7
PAINLEVEL_OUTOF10: 7
PAINLEVEL_OUTOF10: 5 - MODERATE PAIN
PAINLEVEL_OUTOF10: 10 - WORST POSSIBLE PAIN
PAINLEVEL_OUTOF10: 7
PAINLEVEL_OUTOF10: 0 - NO PAIN
PAINLEVEL_OUTOF10: 10 - WORST POSSIBLE PAIN
PAINLEVEL_OUTOF10: 9
PAINLEVEL_OUTOF10: 5 - MODERATE PAIN

## 2025-05-10 ASSESSMENT — COGNITIVE AND FUNCTIONAL STATUS - GENERAL
MOBILITY SCORE: 24
DAILY ACTIVITIY SCORE: 24

## 2025-05-10 ASSESSMENT — PAIN - FUNCTIONAL ASSESSMENT

## 2025-05-10 ASSESSMENT — PAIN DESCRIPTION - DESCRIPTORS
DESCRIPTORS: THROBBING;TIGHTNESS
DESCRIPTORS: THROBBING
DESCRIPTORS: TIGHTNESS;THROBBING
DESCRIPTORS: THROBBING;TIGHTNESS

## 2025-05-10 ASSESSMENT — PAIN DESCRIPTION - ORIENTATION
ORIENTATION: RIGHT

## 2025-05-10 NOTE — NURSING NOTE
EOS: Patient had intermittent rest through the night. His pain was controlled somewhat effectively with PRN morphine and oxy 10mg. Safety has been maintained. He is stable at the time of writing.

## 2025-05-10 NOTE — CARE PLAN
The patient's goals for the shift include      The clinical goals for the shift include See plan of care      Problem: Fall/Injury  Goal: Not fall by end of shift  Outcome: Progressing  Goal: Be free from injury by end of the shift  Outcome: Progressing  Goal: Verbalize understanding of personal risk factors for fall in the hospital  Outcome: Progressing  Goal: Verbalize understanding of risk factor reduction measures to prevent injury from fall in the home  Outcome: Progressing  Goal: Use assistive devices by end of the shift  Outcome: Progressing  Goal: Pace activities to prevent fatigue by end of the shift  Outcome: Progressing     Problem: Pain  Goal: Takes deep breaths with improved pain control throughout the shift  Outcome: Progressing  Goal: Turns in bed with improved pain control throughout the shift  Outcome: Progressing  Goal: Walks with improved pain control throughout the shift  Outcome: Progressing  Goal: Performs ADL's with improved pain control throughout shift  Outcome: Progressing  Goal: Participates in PT with improved pain control throughout the shift  Outcome: Progressing  Goal: Free from opioid side effects throughout the shift  Outcome: Progressing  Goal: Free from acute confusion related to pain meds throughout the shift  Outcome: Progressing     Problem: Pain - Adult  Goal: Verbalizes/displays adequate comfort level or baseline comfort level  Outcome: Progressing     Problem: Safety - Adult  Goal: Free from fall injury  Outcome: Progressing     Problem: Discharge Planning  Goal: Discharge to home or other facility with appropriate resources  Outcome: Progressing     Problem: Chronic Conditions and Co-morbidities  Goal: Patient's chronic conditions and co-morbidity symptoms are monitored and maintained or improved  Outcome: Progressing     Problem: Nutrition  Goal: Nutrient intake appropriate for maintaining nutritional needs  Outcome: Progressing     Problem: Skin  Goal: Decreased wound  size/increased tissue granulation at next dressing change  Outcome: Progressing  Goal: Promote skin healing  Outcome: Progressing

## 2025-05-10 NOTE — PROGRESS NOTES
"Sundar Gentile is a 59 y.o. male on day 3 of admission presenting with Wound infection.    Subjective   He states his he is having significant throbbing and burning pain in his right hand/wrist/forearm despite his current morphine and Percocet regiment.  He has been keeping the right hand elevated.  He has maintained dressing and splint as instructed.  He is tolerating his diet.  He reports positive bowel movement.  He denies any other problems or complaints at this time.  His wife is present with him throughout the entire visit.       Objective     Physical Exam  On physical examination he is alert and oriented x 3, pleasant and cooperative, in no acute distress.    The right hand is examined.  The dressing and splint are removed.  The wound is cleaned and dried.  The distal tips of all 5 digits are pink, warm, capillary refill 1 to 2 seconds.  The distal tips of all 5 digits are intact to sensation of touch.  He demonstrates full range of motion of the digits.  The wrist is not placed through range of motion at this time.  The operative clean and intact.  There is minimal serosanguineous drainage.  Sutures are intact.  The Penrose drain is intact.  There is some minimal/mild edema.  There is no erythema.  There is no other drainage or discharge.  The hand is soft to palpation.  There is no tense or taut areas.  There are no Knievel signs.  Sensation is completely intact in the entire hand and all 5 digits.  Radial and ulnar pulses are +2/4 and palpable.  The hand is warm and demonstrates a capillary refill 1 second.  The rotational/transposition flap is pink, and warm, and demonstrates capillary refill of 1 to 2 seconds.  There is minimal/mild discomfort to palpation.  The remainder the exam is within normal limits for him.    Last Recorded Vitals  Blood pressure 143/87, pulse 88, temperature 37 °C (98.6 °F), temperature source Temporal, resp. rate 16, height 1.803 m (5' 10.98\"), weight 108 kg (237 lb 3.4 oz), " SpO2 97%.  Intake/Output last 3 Shifts:  I/O last 3 completed shifts:  In: 1000 (9.3 mL/kg) [I.V.:700 (6.5 mL/kg); IV Piggyback:300]  Out: 450 (4.2 mL/kg) [Urine:450 (0.1 mL/kg/hr)]  Weight: 107.6 kg     Relevant Results    Scheduled medications  Scheduled Medications[1]  Continuous medications  Continuous Medications[2]  PRN medications  PRN Medications[3]    Labs  No results found for this or any previous visit (from the past 24 hours).     Imaging  Imaging  XR chest 1 view  Result Date: 5/7/2025  1.  No evidence of acute cardiopulmonary process. Left-sided PICC line with the tip in the atriocaval junction.       MACRO: None   Signed by: Josué Nunez 5/7/2025 6:50 PM Dictation workstation:   [id number]      Cardiology, Vascular, and Other Imaging  Bedside PICC Imaging  Result Date: 5/7/2025  These images are not reportable by radiology and will not be interpreted by  Radiologists.           Assessment/Plan   Assessment & Plan  Wound infection    Surgical wound infection    Wound dehiscence, surgical, subsequent encounter    Other specified disorders of synovium, right wrist    Postop day #1 status post exploration, irrigation, excisional debridement devitalized skin and subcutaneous tissue and radical excision ulnar, mid palm, and radial bursa and radical flexor synovectomy at the wrist and hand level of all flexor tendons with Penrose drain placement and rotational/transposition flap closure.  1.  The wound was further cleaned and dried the appropriate sterile dressing and splint reapplied.  He is instructed appropriate limitations in use and activity of this hand.  He is instructed keep the hand elevated on 3-4 pillows at all times.  2.  Continue IV antibiotics as directed by infectious disease specialist.  Repeat cultures and tissue cultures are pending at this time.  3.  Pain control will change morphine to 4 mg every 2 hours as needed severe pain and I will also add Motrin 600 mg every 6 hours as  needed pain as well.  4.  Continue diet and Colace for constipation  5.  May ambulate and shower if wound and PICC line are protected  6.  Continue present care as directed, call me [phone] if needed.       I spent 30 minutes in the professional and overall care of this patient.      Mayur Williamson MD           [1] ascorbic acid, 500 mg, oral, Daily  atorvastatin, 20 mg, oral, Nightly  calcium carbonate, 1 tablet, oral, Daily  ceFAZolin, 2 g, intravenous, q8h  heparin flush, 0.5 mL, intravenous, Daily  losartan, 100 mg, oral, Nightly  morphine, 2 mg, intravenous, Once  pantoprazole, 40 mg, oral, Daily before breakfast  sodium chloride 0.9%, 10 mL, intravenous, BID    [2]    [3] PRN medications: acetaminophen **OR** acetaminophen **OR** acetaminophen, alteplase, amphetamine-dextroamphetamine, cyclobenzaprine, docusate sodium, morphine, oxyCODONE

## 2025-05-10 NOTE — NURSING NOTE
0700: Assumed care of pt     1335: Pt reports 10/10 pain to R hand. Pt states that the morphine is not helping when given. MD Williamson updated via phone. Waiting for return call.     EOS note: Pt has remained HDS this shift. Drsg was changed by Dr. Williamson during this shift. Pt tai continued to elevated extremity. Cap refill <3 to R hand. Pts pain has been managed today with PRN morphine and oxycodone. Medications taken as ordered, safety maintained. Call light and fluids are within reach.

## 2025-05-10 NOTE — CARE PLAN
The patient's goals for the shift include      The clinical goals for the shift include pt pain will be controlled this shift    Over the shift, the patient did not make progress toward the following goals. Barriers to progression include surgery. Recommendations to address these barriers include pain management with prn pain medication.

## 2025-05-11 PROCEDURE — 2500000001 HC RX 250 WO HCPCS SELF ADMINISTERED DRUGS (ALT 637 FOR MEDICARE OP)

## 2025-05-11 PROCEDURE — 2500000004 HC RX 250 GENERAL PHARMACY W/ HCPCS (ALT 636 FOR OP/ED)

## 2025-05-11 PROCEDURE — 1100000001 HC PRIVATE ROOM DAILY

## 2025-05-11 PROCEDURE — 2500000004 HC RX 250 GENERAL PHARMACY W/ HCPCS (ALT 636 FOR OP/ED): Mod: JW

## 2025-05-11 PROCEDURE — 2500000002 HC RX 250 W HCPCS SELF ADMINISTERED DRUGS (ALT 637 FOR MEDICARE OP, ALT 636 FOR OP/ED)

## 2025-05-11 RX ADMIN — OXYCODONE HYDROCHLORIDE 10 MG: 10 TABLET ORAL at 12:35

## 2025-05-11 RX ADMIN — MORPHINE SULFATE 4 MG: 4 INJECTION, SOLUTION INTRAMUSCULAR; INTRAVENOUS at 14:28

## 2025-05-11 RX ADMIN — IBUPROFEN 600 MG: 600 TABLET, FILM COATED ORAL at 08:39

## 2025-05-11 RX ADMIN — OXYCODONE HYDROCHLORIDE 10 MG: 10 TABLET ORAL at 18:22

## 2025-05-11 RX ADMIN — CYCLOBENZAPRINE 10 MG: 10 TABLET, FILM COATED ORAL at 22:08

## 2025-05-11 RX ADMIN — MORPHINE SULFATE 4 MG: 4 INJECTION, SOLUTION INTRAMUSCULAR; INTRAVENOUS at 19:48

## 2025-05-11 RX ADMIN — OXYCODONE HYDROCHLORIDE 10 MG: 10 TABLET ORAL at 06:55

## 2025-05-11 RX ADMIN — ACETAMINOPHEN 650 MG: 325 TABLET ORAL at 22:13

## 2025-05-11 RX ADMIN — PANTOPRAZOLE SODIUM 40 MG: 20 TABLET, DELAYED RELEASE ORAL at 06:55

## 2025-05-11 RX ADMIN — IBUPROFEN 600 MG: 600 TABLET, FILM COATED ORAL at 18:22

## 2025-05-11 RX ADMIN — MORPHINE SULFATE 4 MG: 4 INJECTION, SOLUTION INTRAMUSCULAR; INTRAVENOUS at 22:17

## 2025-05-11 RX ADMIN — CEFAZOLIN SODIUM 2 G: 2 INJECTION, SOLUTION INTRAVENOUS at 14:33

## 2025-05-11 RX ADMIN — CEFAZOLIN SODIUM 2 G: 2 INJECTION, SOLUTION INTRAVENOUS at 22:13

## 2025-05-11 RX ADMIN — HEPARIN 50 UNITS: 100 SYRINGE at 08:30

## 2025-05-11 RX ADMIN — OXYCODONE HYDROCHLORIDE AND ACETAMINOPHEN 500 MG: 500 TABLET ORAL at 08:31

## 2025-05-11 RX ADMIN — CEFAZOLIN SODIUM 2 G: 2 INJECTION, SOLUTION INTRAVENOUS at 05:30

## 2025-05-11 RX ADMIN — LOSARTAN POTASSIUM 100 MG: 100 TABLET, FILM COATED ORAL at 22:08

## 2025-05-11 RX ADMIN — Medication 10 ML: at 08:31

## 2025-05-11 RX ADMIN — ACETAMINOPHEN 650 MG: 325 TABLET ORAL at 18:22

## 2025-05-11 RX ADMIN — MORPHINE SULFATE 4 MG: 4 INJECTION, SOLUTION INTRAMUSCULAR; INTRAVENOUS at 05:32

## 2025-05-11 RX ADMIN — IBUPROFEN 600 MG: 600 TABLET, FILM COATED ORAL at 02:47

## 2025-05-11 RX ADMIN — Medication 10 ML: at 22:09

## 2025-05-11 RX ADMIN — ACETAMINOPHEN 650 MG: 325 TABLET ORAL at 08:39

## 2025-05-11 RX ADMIN — DOCUSATE SODIUM 100 MG: 100 CAPSULE, LIQUID FILLED ORAL at 06:55

## 2025-05-11 RX ADMIN — ATORVASTATIN CALCIUM 20 MG: 20 TABLET, FILM COATED ORAL at 22:08

## 2025-05-11 RX ADMIN — MORPHINE SULFATE 4 MG: 4 INJECTION, SOLUTION INTRAMUSCULAR; INTRAVENOUS at 02:40

## 2025-05-11 ASSESSMENT — COGNITIVE AND FUNCTIONAL STATUS - GENERAL
DAILY ACTIVITIY SCORE: 24
MOBILITY SCORE: 24

## 2025-05-11 ASSESSMENT — PAIN SCALES - GENERAL
PAINLEVEL_OUTOF10: 9
PAINLEVEL_OUTOF10: 8
PAINLEVEL_OUTOF10: 6
PAINLEVEL_OUTOF10: 2
PAINLEVEL_OUTOF10: 0 - NO PAIN
PAINLEVEL_OUTOF10: 5 - MODERATE PAIN
PAINLEVEL_OUTOF10: 9
PAINLEVEL_OUTOF10: 4
PAINLEVEL_OUTOF10: 4
PAINLEVEL_OUTOF10: 1
PAINLEVEL_OUTOF10: 9
PAINLEVEL_OUTOF10: 8
PAINLEVEL_OUTOF10: 8
PAINLEVEL_OUTOF10: 5 - MODERATE PAIN
PAINLEVEL_OUTOF10: 1
PAINLEVEL_OUTOF10: 1
PAINLEVEL_OUTOF10: 6

## 2025-05-11 ASSESSMENT — PAIN DESCRIPTION - ORIENTATION
ORIENTATION: RIGHT

## 2025-05-11 ASSESSMENT — PAIN DESCRIPTION - LOCATION
LOCATION: HAND
LOCATION: HAND
LOCATION: ARM
LOCATION: ARM
LOCATION: HAND
LOCATION: ARM

## 2025-05-11 ASSESSMENT — PAIN DESCRIPTION - DESCRIPTORS
DESCRIPTORS: THROBBING;TIGHTNESS
DESCRIPTORS: THROBBING
DESCRIPTORS: THROBBING
DESCRIPTORS: THROBBING;TIGHTNESS
DESCRIPTORS: THROBBING;TIGHTNESS

## 2025-05-11 NOTE — PROGRESS NOTES
" INPATIENT PROGRESS NOTES    PATIENT NAME: Sundar Gentile    MRN: [id number]  SERVICE DATE:  5/11/2025   SERVICE TIME:  12:05 PM    SIGNATURE: Jey Pereira MD      ASSESSMENT :   -Right hand surgical wound infection  -Recent culture grew MSSA  -Status post debridement on 5/9--> OR cultures NGTD  -Previous wound culture for Enterobacter cloacae treated with approximately 4 weeks of ertapenem  -Status post right hand carpal tunnel surgery on 2/21  -History of hypertension, hyperlipidemia     PLAN:  - Continue cefazolin through 6/4  - Closely monitor for antibiotics side effects including rash, Diarrhea/CDI, thrombocytopenia, KAI, etc.  - Follow up with me on 5/22              SUBJECTIVE  Afebrile  No events overnight       OBJECTIVE  PHYSICAL EXAM:   Patient Vitals for the past 24 hrs:   BP Temp Temp src Pulse Resp SpO2   05/11/25 0800 (!) 125/91 36.2 °C (97.2 °F) Temporal 53 16 98 %   05/11/25 0000 124/77 36.1 °C (97 °F) Temporal 80 16 95 %   05/10/25 2000 108/62 36.6 °C (97.9 °F) Temporal 82 16 94 %   05/10/25 1600 143/87 37 °C (98.6 °F) Temporal 88 16 97 %     R hand is wrapped       Labs:  Lab Results   Component Value Date    WBC 4.3 (L) 05/08/2025    HGB 12.6 (L) 05/08/2025    HCT 38.7 (L) 05/08/2025    MCV 94 05/08/2025     05/08/2025     Lab Results   Component Value Date    GLUCOSE 100 (H) 05/08/2025    CALCIUM 8.8 05/08/2025     05/08/2025    K 3.5 05/08/2025    CO2 27 05/08/2025     05/08/2025    BUN 12 05/08/2025    CREATININE 0.93 05/08/2025   ESR: --  Lab Results   Component Value Date    SEDRATE <1 09/06/2024     Lab Results   Component Value Date    CRP <3.0 04/17/2025   No results found for: \"ALT\", \"AST\", \"GGT\", \"ALKPHOS\", \"BILITOT\"      DATA:   Diagnostic tests reviewed for today's visit:    Labs this admission reviewed  Imagings this admission reviewed  Cultures: Reviewed        Jey Pereira MD.   Infectious Disease Attending            This note was partially created using " "voice recognition software and is inherently subject to errors including those of syntax and \"sound-alike\" substitutions which may escape proofreading. In such instances, original meaning may be extrapolated by contextual derivation       "

## 2025-05-11 NOTE — PROGRESS NOTES
"Sundar Gentile is a 59 y.o. male on day 4 of admission presenting with Wound infection.    Subjective   He states that his pain is under better control with the increased morphine as well as the oxycodone and Motrin and Tylenol.  He states the pain is more manageable and under control at this moment.  He has been keeping the right hand elevated.  He has maintained dressing and splint as instructed.  He is tolerating his diet.  He reports bowel movement.  He denies any new problems or complaints.  He reports less burning pain.       Objective     Physical Exam  On physical examination he is alert and oriented x 3, pleasant and cooperative, in no acute distress.    The right hand is examined.  The dressing and splint are removed the wound is cleaned and dried.  The distal tips of all 5 digits are pink, warm, capillary refill 1 to 2 seconds.  The distal tips of all 5 digits are intact to sensation of touch.  The entire hand is intact to sensation.  He demonstrates full range of motion of the digits and the wrist.  There is some minimal edema.  There is currently no erythema.  There is some minimal serosanguineous drainage.  There is no other drainage or discharge.  The hand is soft to palpation.  There are no tense or taut areas.  There are no Knievel signs.  Radial and ulnar pulses are +2/4 and palpable.  The rotation/transitional flap is warm and demonstrates a capillary refill of 1 to 2 seconds.  There is some minimal/mild discomfort to palpation.  The remainder the exam is within normal limits for him.  Last Recorded Vitals  Blood pressure (!) 125/91, pulse 53, temperature 36.2 °C (97.2 °F), temperature source Temporal, resp. rate 16, height 1.803 m (5' 10.98\"), weight 108 kg (237 lb 3.4 oz), SpO2 98%.  Intake/Output last 3 Shifts:  I/O last 3 completed shifts:  In: 340 (3.2 mL/kg) [P.O.:240; IV Piggyback:100]  Out: 450 (4.2 mL/kg) [Urine:450 (0.1 mL/kg/hr)]  Weight: 107.6 kg     Relevant Results    Scheduled " medications  Scheduled Medications[1]  Continuous medications  Continuous Medications[2]  PRN medications  PRN Medications[3]    Labs  No results found for this or any previous visit (from the past 24 hours).     Imaging  Imaging  XR chest 1 view  Result Date: 5/7/2025  1.  No evidence of acute cardiopulmonary process. Left-sided PICC line with the tip in the atriocaval junction.       MACRO: None   Signed by: Josué Nunez 5/7/2025 6:50 PM Dictation workstation:   [id number]      Cardiology, Vascular, and Other Imaging  Bedside PICC Imaging  Result Date: 5/7/2025  These images are not reportable by radiology and will not be interpreted by  Radiologists.           Assessment/Plan   Assessment & Plan  Wound infection    Surgical wound infection    Wound dehiscence, surgical, subsequent encounter    Other specified disorders of synovium, right wrist    Postop day #2 status post exploration, irrigation, excisional debridement devitalized skin and subcutaneous tissue and radical excision ulnar, mid palm, and radial bursa and radical flexor synovectomy at the wrist and hand level of all flexor tendons with Penrose drain placement and rotational/transposition flap closure.  1.  The wound was further cleaned and dried and appropriate sterile dressing and splint reapplied today.  He demonstrates some progression in wound healing.  He is instructed appropriate limitations in use and activity of this hand and wrist.  He is instructed to keep the hand elevated on 3-4 pillows at all times.  2.  Continue IV antibiotics as directed by infectious disease specialist.  Repeat cultures and tissue cultures are pending at this time.  We will await results.  3.  Pain control is improved and we will continue current management at this time with multiple medications and begin to wean the morphine first as his pain level decreases.  4.  Continue regular diet and Colace for constipation.  5.  May ambulate and shower if wound and  PICC line are protected  6.  Continue present care as directed.  Call me directly at for any issues, problems, or concerns.       I spent 30 minutes in the professional and overall care of this patient.      Mayur Williamson MD           [1] ascorbic acid, 500 mg, oral, Daily  atorvastatin, 20 mg, oral, Nightly  calcium carbonate, 1 tablet, oral, Daily  ceFAZolin, 2 g, intravenous, q8h  heparin flush, 0.5 mL, intravenous, Daily  losartan, 100 mg, oral, Nightly  pantoprazole, 40 mg, oral, Daily before breakfast  sodium chloride 0.9%, 10 mL, intravenous, BID    [2]    [3] PRN medications: acetaminophen **OR** acetaminophen **OR** acetaminophen, alteplase, amphetamine-dextroamphetamine, cyclobenzaprine, docusate sodium, ibuprofen, morphine, oxyCODONE

## 2025-05-11 NOTE — NURSING NOTE
0700: Assumed care of pt    EOS Note: Pt has remained HDS this shift. PRN pain medication taken to control pain. Pt reports pain has been managed well. Pt cont to ambulate freely in room with no issues. Drsg changed by provider. Safety maintained. Call light and fluids within reach.

## 2025-05-11 NOTE — CARE PLAN
The patient's goals for the shift include      The clinical goals for the shift include See plan of care      Problem: Fall/Injury  Goal: Not fall by end of shift  Outcome: Progressing     Problem: Pain  Goal: Takes deep breaths with improved pain control throughout the shift  Outcome: Progressing     Problem: Pain - Adult  Goal: Verbalizes/displays adequate comfort level or baseline comfort level  Outcome: Progressing     Problem: Nutrition  Goal: Nutrient intake appropriate for maintaining nutritional needs  Outcome: Progressing

## 2025-05-12 LAB
BACTERIA SPEC CULT: NORMAL
BACTERIA SPEC CULT: NORMAL
GRAM STN SPEC: NORMAL

## 2025-05-12 PROCEDURE — 2500000004 HC RX 250 GENERAL PHARMACY W/ HCPCS (ALT 636 FOR OP/ED)

## 2025-05-12 PROCEDURE — 1100000001 HC PRIVATE ROOM DAILY

## 2025-05-12 PROCEDURE — 2500000001 HC RX 250 WO HCPCS SELF ADMINISTERED DRUGS (ALT 637 FOR MEDICARE OP)

## 2025-05-12 RX ADMIN — IBUPROFEN 600 MG: 600 TABLET, FILM COATED ORAL at 13:46

## 2025-05-12 RX ADMIN — CEFAZOLIN SODIUM 2 G: 2 INJECTION, SOLUTION INTRAVENOUS at 20:22

## 2025-05-12 RX ADMIN — MORPHINE SULFATE 4 MG: 4 INJECTION, SOLUTION INTRAMUSCULAR; INTRAVENOUS at 18:09

## 2025-05-12 RX ADMIN — LOSARTAN POTASSIUM 100 MG: 100 TABLET, FILM COATED ORAL at 20:22

## 2025-05-12 RX ADMIN — OXYCODONE HYDROCHLORIDE 10 MG: 10 TABLET ORAL at 12:48

## 2025-05-12 RX ADMIN — MORPHINE SULFATE 4 MG: 4 INJECTION, SOLUTION INTRAMUSCULAR; INTRAVENOUS at 05:26

## 2025-05-12 RX ADMIN — PANTOPRAZOLE SODIUM 40 MG: 20 TABLET, DELAYED RELEASE ORAL at 06:11

## 2025-05-12 RX ADMIN — MORPHINE SULFATE 4 MG: 4 INJECTION, SOLUTION INTRAMUSCULAR; INTRAVENOUS at 08:31

## 2025-05-12 RX ADMIN — DOCUSATE SODIUM 100 MG: 100 CAPSULE, LIQUID FILLED ORAL at 13:47

## 2025-05-12 RX ADMIN — OXYCODONE HYDROCHLORIDE 10 MG: 10 TABLET ORAL at 06:11

## 2025-05-12 RX ADMIN — HEPARIN 50 UNITS: 100 SYRINGE at 08:31

## 2025-05-12 RX ADMIN — MORPHINE SULFATE 4 MG: 4 INJECTION, SOLUTION INTRAMUSCULAR; INTRAVENOUS at 23:12

## 2025-05-12 RX ADMIN — OXYCODONE HYDROCHLORIDE 10 MG: 10 TABLET ORAL at 20:22

## 2025-05-12 RX ADMIN — CEFAZOLIN SODIUM 2 G: 2 INJECTION, SOLUTION INTRAVENOUS at 05:22

## 2025-05-12 RX ADMIN — ATORVASTATIN CALCIUM 20 MG: 20 TABLET, FILM COATED ORAL at 20:22

## 2025-05-12 RX ADMIN — Medication 10 ML: at 08:31

## 2025-05-12 RX ADMIN — CEFAZOLIN SODIUM 2 G: 2 INJECTION, SOLUTION INTRAVENOUS at 13:00

## 2025-05-12 RX ADMIN — MORPHINE SULFATE 4 MG: 4 INJECTION, SOLUTION INTRAMUSCULAR; INTRAVENOUS at 11:36

## 2025-05-12 RX ADMIN — CYCLOBENZAPRINE 10 MG: 10 TABLET, FILM COATED ORAL at 13:47

## 2025-05-12 RX ADMIN — OXYCODONE HYDROCHLORIDE AND ACETAMINOPHEN 500 MG: 500 TABLET ORAL at 08:31

## 2025-05-12 ASSESSMENT — PAIN - FUNCTIONAL ASSESSMENT
PAIN_FUNCTIONAL_ASSESSMENT: 0-10

## 2025-05-12 ASSESSMENT — COGNITIVE AND FUNCTIONAL STATUS - GENERAL
MOBILITY SCORE: 24
DAILY ACTIVITIY SCORE: 24
DAILY ACTIVITIY SCORE: 24
MOBILITY SCORE: 24

## 2025-05-12 ASSESSMENT — PAIN SCALES - GENERAL
PAINLEVEL_OUTOF10: 5 - MODERATE PAIN
PAINLEVEL_OUTOF10: 8
PAINLEVEL_OUTOF10: 9
PAINLEVEL_OUTOF10: 4
PAINLEVEL_OUTOF10: 6
PAINLEVEL_OUTOF10: 8
PAINLEVEL_OUTOF10: 4
PAINLEVEL_OUTOF10: 5 - MODERATE PAIN
PAINLEVEL_OUTOF10: 8
PAINLEVEL_OUTOF10: 6
PAINLEVEL_OUTOF10: 6
PAINLEVEL_OUTOF10: 5 - MODERATE PAIN

## 2025-05-12 ASSESSMENT — PAIN DESCRIPTION - DESCRIPTORS
DESCRIPTORS: ACHING;SORE
DESCRIPTORS: ACHING
DESCRIPTORS: ACHING

## 2025-05-12 ASSESSMENT — PAIN DESCRIPTION - LOCATION
LOCATION: HAND

## 2025-05-12 ASSESSMENT — PAIN DESCRIPTION - ORIENTATION
ORIENTATION: RIGHT

## 2025-05-12 NOTE — CARE PLAN
The patient's goals for the shift include      The clinical goals for the shift include See plan of care      Problem: Fall/Injury  Goal: Not fall by end of shift  Outcome: Progressing  Goal: Be free from injury by end of the shift  Outcome: Progressing  Goal: Verbalize understanding of personal risk factors for fall in the hospital  Outcome: Progressing  Goal: Verbalize understanding of risk factor reduction measures to prevent injury from fall in the home  Outcome: Progressing  Goal: Use assistive devices by end of the shift  Outcome: Progressing  Goal: Pace activities to prevent fatigue by end of the shift  Outcome: Progressing     Problem: Pain  Goal: Takes deep breaths with improved pain control throughout the shift  Outcome: Progressing  Goal: Turns in bed with improved pain control throughout the shift  Outcome: Progressing  Goal: Walks with improved pain control throughout the shift  Outcome: Progressing  Goal: Performs ADL's with improved pain control throughout shift  Outcome: Progressing  Goal: Participates in PT with improved pain control throughout the shift  Outcome: Progressing  Goal: Free from opioid side effects throughout the shift  Outcome: Progressing  Goal: Free from acute confusion related to pain meds throughout the shift  Outcome: Progressing     Problem: Pain - Adult  Goal: Verbalizes/displays adequate comfort level or baseline comfort level  Outcome: Progressing     Problem: Safety - Adult  Goal: Free from fall injury  Outcome: Progressing     Problem: Discharge Planning  Goal: Discharge to home or other facility with appropriate resources  Outcome: Progressing     Problem: Chronic Conditions and Co-morbidities  Goal: Patient's chronic conditions and co-morbidity symptoms are monitored and maintained or improved  Outcome: Progressing     Problem: Nutrition  Goal: Nutrient intake appropriate for maintaining nutritional needs  Outcome: Progressing     Problem: Skin  Goal: Decreased wound  size/increased tissue granulation at next dressing change  Outcome: Progressing  Goal: Promote skin healing  Outcome: Progressing   EOS: pt has been medicated with prn oxycodone and morphine for c/o pain to right hand with relief. He has kept his arm elevated on 4 pillows at all times. He has received his ivatb as ordered. Slept well overnight. Resting comfortably at this time.

## 2025-05-12 NOTE — CARE PLAN
EOS: Pt. Stable throughout this shift and was able to have an uneventful day. Pt. Was able to ambulate in the halls and throughout the room this shift independently. Dressing remained clean, dry and intact. Pt. C/o pain to hand this shift treated with PRN medications with pt reporting relief at times. Pt. Maintained on IV antibiotics without complications at this time. Dr. Williamson at bedside this shift to assess/change dressing. Pt. Currently resting in bed at this time. Call light within reach.

## 2025-05-12 NOTE — PROGRESS NOTES
"Sundar Gentile is a 59 y.o. male on day 5 of admission presenting with Wound infection.    Subjective   He states that his pain is under better control and is now decreasing the need for IV morphine.  He states that his pain is decreasing as well.  He has been keeping the right hand up.  He has maintained dressing and splint as instructed.  He is tolerating his diet.  He reports bowel movement.  He states he did have some episodes of some burning pain today but overall these were minimal and have resolved.  He denies any new problems or complaints       Objective     Physical Exam  On physical examination he is alert and oriented x 3, pleasant and cooperative, in no acute distress    The right hand is examined.  The dressing and splint are removed.  The wound is cleaned and dried.  The distal tips of all 5 digits are pink, warm, capillary refill 1 to 2 seconds.  The distal tips of all 5 digits are intact to sensation of touch.  The entire hand is intact to sensation.  He demonstrates full range of motion of the digits and the wrist.  There is some minimal edema.  There is no erythema.  There is no ascending lymphangitis.  There is some minimal/mild serosanguineous drainage.  There is no purulent drainage.  There is no other drainage or discharge.  The Penrose drain remains in position.  The hand is soft to palpation.  There are no tense or taut areas.  There are no Knievel signs.  Radial and ulnar pulses are +2/4 and palpable.  The rotation/transposition flap is warm, pink, and has capillary refill 1 to 2 seconds.  There is some minimal/mild discomfort to palpation.  The remainder of the exam is within normal limits for him.  Last Recorded Vitals  Blood pressure (!) 146/98, pulse 87, temperature 36.9 °C (98.4 °F), temperature source Temporal, resp. rate 16, height 1.803 m (5' 10.98\"), weight 108 kg (237 lb 3.4 oz), SpO2 97%.  Intake/Output last 3 Shifts:  I/O last 3 completed shifts:  In: 540 (5 mL/kg) [P.O.:240; IV " Piggyback:300]  Out: - (0 mL/kg)   Weight: 107.6 kg     Relevant Results    Scheduled medications  Scheduled Medications[1]  Continuous medications  Continuous Medications[2]  PRN medications  PRN Medications[3]    Labs  No results found for this or any previous visit (from the past 24 hours).     Imaging  Imaging  XR chest 1 view  Result Date: 5/7/2025  1.  No evidence of acute cardiopulmonary process. Left-sided PICC line with the tip in the atriocaval junction.       MACRO: None   Signed by: Josué Nunez 5/7/2025 6:50 PM Dictation workstation:   [id number]      Cardiology, Vascular, and Other Imaging  Bedside PICC Imaging  Result Date: 5/7/2025  These images are not reportable by radiology and will not be interpreted by  Radiologists.           Assessment/Plan   Assessment & Plan  Wound infection    Surgical wound infection    Wound dehiscence, surgical, subsequent encounter    Other specified disorders of synovium, right wrist    Postop day #3 status post exploration, irrigation, excisional debridement devitalized skin and subcutaneous tissue and radical excision ulnar, mid palm, radial bursa and radical flexor synovectomy at the wrist and hand level of all flexor tendons with Penrose drain placement and rotational/transposition flap closure  1.  The wound was further cleaned and dried and the Penrose drain removed under sterile technique without difficulty.  The appropriate sterile dressing and splint were then reapplied.  He demonstrates some further progression in wound healing.  He is instructed to continue the appropriate limitations in use and activity of this hand and wrist.  He is instructed to keep the hand elevated on 3-4 pillows at all times.  2.  Continue IV antibiotics as directed by infectious disease specialist.  Repeat cultures at this time are negative and Gram stain only demonstrates polymorphonuclear cells.  Will need to arrange home IV antibiotic therapy via the PICC line as directed  by infectious disease specialist tomorrow if possible so that we may consider discharge.  3.  Pain control is improving.  We will begin to wean the IV morphine use and encourage the oral pain medication so that we may consider possible discharge tomorrow  4.  Continue regular diet and Colace for constipation  5.  May ambulate and shower if wound and PICC line are protected  6.  Continue present care if home IV antibiotic therapy can be arranged and his wound continues to improve on tomorrow's dressing change then we may consider discharge to home.  Call me directly for any issues, problems, or concerns.       I spent 30 minutes in the professional and overall care of this patient.      Mayur Williamson MD           [1] ascorbic acid, 500 mg, oral, Daily  atorvastatin, 20 mg, oral, Nightly  calcium carbonate, 1 tablet, oral, Daily  ceFAZolin, 2 g, intravenous, q8h  heparin flush, 0.5 mL, intravenous, Daily  losartan, 100 mg, oral, Nightly  pantoprazole, 40 mg, oral, Daily before breakfast  sodium chloride 0.9%, 10 mL, intravenous, BID    [2]    [3] PRN medications: acetaminophen **OR** acetaminophen **OR** acetaminophen, alteplase, amphetamine-dextroamphetamine, cyclobenzaprine, docusate sodium, ibuprofen, morphine, oxyCODONE

## 2025-05-12 NOTE — PROGRESS NOTES
" INPATIENT PROGRESS NOTES    PATIENT NAME: Sundar Gentile    MRN: [id number]  SERVICE DATE:  5/12/2025   SERVICE TIME:  2:32 PM    SIGNATURE: Jey Pereira MD      ASSESSMENT :   -Right hand surgical wound infection  -Recent culture grew MSSA  -Status post debridement on 5/9--> OR cultures NG  -Previous wound culture for Enterobacter cloacae treated with approximately 4 weeks of ertapenem  -Status post right hand carpal tunnel surgery on 2/21  -History of hypertension, hyperlipidemia     PLAN:  - Tolerating cefazolin with no side effects  - The patient to arrange follow-up with me on 5/22  - Closely monitor for antibiotic side effects including CDI, KAI, thrombocytopenia, etc.              SUBJECTIVE  Afebrile  No events overnight       OBJECTIVE  PHYSICAL EXAM:   Patient Vitals for the past 24 hrs:   BP Temp Temp src Pulse Resp SpO2   05/12/25 0800 (!) 147/96 36.3 °C (97.3 °F) Temporal 75 16 99 %   05/12/25 0000 114/74 36 °C (96.8 °F) Temporal 67 16 98 %   05/11/25 2000 (!) 164/102 36.3 °C (97.3 °F) Temporal 78 16 96 %   05/11/25 1600 (!) 162/95 35.8 °C (96.4 °F) Temporal 81 16 96 %     R hand is wrapped       Labs:  Lab Results   Component Value Date    WBC 4.3 (L) 05/08/2025    HGB 12.6 (L) 05/08/2025    HCT 38.7 (L) 05/08/2025    MCV 94 05/08/2025     05/08/2025     Lab Results   Component Value Date    GLUCOSE 100 (H) 05/08/2025    CALCIUM 8.8 05/08/2025     05/08/2025    K 3.5 05/08/2025    CO2 27 05/08/2025     05/08/2025    BUN 12 05/08/2025    CREATININE 0.93 05/08/2025   ESR: --  Lab Results   Component Value Date    SEDRATE <1 09/06/2024     Lab Results   Component Value Date    CRP <3.0 04/17/2025   No results found for: \"ALT\", \"AST\", \"GGT\", \"ALKPHOS\", \"BILITOT\"      DATA:   Diagnostic tests reviewed for today's visit:    Labs this admission reviewed  Imagings this admission reviewed  Cultures: Reviewed        Jey Pereira MD.   Infectious Disease Attending            This note was " "partially created using voice recognition software and is inherently subject to errors including those of syntax and \"sound-alike\" substitutions which may escape proofreading. In such instances, original meaning may be extrapolated by contextual derivation       "

## 2025-05-13 ENCOUNTER — APPOINTMENT (OUTPATIENT)
Dept: ORTHOPEDIC SURGERY | Facility: CLINIC | Age: 60
End: 2025-05-13
Payer: COMMERCIAL

## 2025-05-13 ENCOUNTER — HOME INFUSION (OUTPATIENT)
Dept: INFUSION THERAPY | Age: 60
End: 2025-05-13
Payer: COMMERCIAL

## 2025-05-13 ENCOUNTER — DOCUMENTATION (OUTPATIENT)
Dept: HOME HEALTH SERVICES | Facility: HOME HEALTH | Age: 60
End: 2025-05-13
Payer: COMMERCIAL

## 2025-05-13 ENCOUNTER — PHARMACY VISIT (OUTPATIENT)
Dept: PHARMACY | Facility: CLINIC | Age: 60
End: 2025-05-13
Payer: COMMERCIAL

## 2025-05-13 VITALS
OXYGEN SATURATION: 96 % | TEMPERATURE: 98.1 F | BODY MASS INDEX: 33.21 KG/M2 | SYSTOLIC BLOOD PRESSURE: 134 MMHG | HEIGHT: 71 IN | RESPIRATION RATE: 16 BRPM | WEIGHT: 237.22 LBS | DIASTOLIC BLOOD PRESSURE: 98 MMHG | HEART RATE: 71 BPM

## 2025-05-13 DIAGNOSIS — T81.49XA WOUND INFECTION AFTER SURGERY: ICD-10-CM

## 2025-05-13 LAB
ANION GAP SERPL CALC-SCNC: 13 MMOL/L (ref 10–20)
BASOPHILS # BLD AUTO: 0.06 X10*3/UL (ref 0–0.1)
BASOPHILS NFR BLD AUTO: 0.9 %
BUN SERPL-MCNC: 12 MG/DL (ref 6–23)
CALCIUM SERPL-MCNC: 9.2 MG/DL (ref 8.6–10.3)
CHLORIDE SERPL-SCNC: 103 MMOL/L (ref 98–107)
CO2 SERPL-SCNC: 27 MMOL/L (ref 21–32)
CREAT SERPL-MCNC: 0.99 MG/DL (ref 0.5–1.3)
EGFRCR SERPLBLD CKD-EPI 2021: 87 ML/MIN/1.73M*2
EOSINOPHIL # BLD AUTO: 0.21 X10*3/UL (ref 0–0.7)
EOSINOPHIL NFR BLD AUTO: 3.1 %
ERYTHROCYTE [DISTWIDTH] IN BLOOD BY AUTOMATED COUNT: 12.7 % (ref 11.5–14.5)
GLUCOSE SERPL-MCNC: 103 MG/DL (ref 74–99)
HCT VFR BLD AUTO: 42.5 % (ref 41–52)
HGB BLD-MCNC: 13.8 G/DL (ref 13.5–17.5)
IMM GRANULOCYTES # BLD AUTO: 0.04 X10*3/UL (ref 0–0.7)
IMM GRANULOCYTES NFR BLD AUTO: 0.6 % (ref 0–0.9)
LYMPHOCYTES # BLD AUTO: 1.85 X10*3/UL (ref 1.2–4.8)
LYMPHOCYTES NFR BLD AUTO: 27.2 %
MCH RBC QN AUTO: 30.5 PG (ref 26–34)
MCHC RBC AUTO-ENTMCNC: 32.5 G/DL (ref 32–36)
MCV RBC AUTO: 94 FL (ref 80–100)
MONOCYTES # BLD AUTO: 0.65 X10*3/UL (ref 0.1–1)
MONOCYTES NFR BLD AUTO: 9.5 %
NEUTROPHILS # BLD AUTO: 4 X10*3/UL (ref 1.2–7.7)
NEUTROPHILS NFR BLD AUTO: 58.7 %
NRBC BLD-RTO: 0 /100 WBCS (ref 0–0)
PLATELET # BLD AUTO: 266 X10*3/UL (ref 150–450)
POTASSIUM SERPL-SCNC: 3.8 MMOL/L (ref 3.5–5.3)
RBC # BLD AUTO: 4.52 X10*6/UL (ref 4.5–5.9)
SODIUM SERPL-SCNC: 139 MMOL/L (ref 136–145)
WBC # BLD AUTO: 6.8 X10*3/UL (ref 4.4–11.3)

## 2025-05-13 PROCEDURE — RXMED WILLOW AMBULATORY MEDICATION CHARGE

## 2025-05-13 PROCEDURE — 2500000004 HC RX 250 GENERAL PHARMACY W/ HCPCS (ALT 636 FOR OP/ED)

## 2025-05-13 PROCEDURE — 2500000002 HC RX 250 W HCPCS SELF ADMINISTERED DRUGS (ALT 637 FOR MEDICARE OP, ALT 636 FOR OP/ED)

## 2025-05-13 PROCEDURE — 82374 ASSAY BLOOD CARBON DIOXIDE: CPT

## 2025-05-13 PROCEDURE — 2500000001 HC RX 250 WO HCPCS SELF ADMINISTERED DRUGS (ALT 637 FOR MEDICARE OP)

## 2025-05-13 PROCEDURE — 85025 COMPLETE CBC W/AUTO DIFF WBC: CPT

## 2025-05-13 RX ORDER — CEFAZOLIN SODIUM 2 G/100ML
2 INJECTION, SOLUTION INTRAVENOUS EVERY 8 HOURS
Qty: 6600 ML | Refills: 0 | Status: SHIPPED | OUTPATIENT
Start: 2025-05-13 | End: 2025-06-04

## 2025-05-13 RX ORDER — OXYCODONE HYDROCHLORIDE 10 MG/1
10 TABLET ORAL EVERY 6 HOURS PRN
Qty: 10 TABLET | Refills: 0 | Status: SHIPPED | OUTPATIENT
Start: 2025-05-13

## 2025-05-13 RX ORDER — OXYCODONE HYDROCHLORIDE 10 MG/1
10 TABLET ORAL EVERY 6 HOURS PRN
Refills: 0 | Status: DISCONTINUED | OUTPATIENT
Start: 2025-05-13 | End: 2025-05-13 | Stop reason: HOSPADM

## 2025-05-13 RX ADMIN — HEPARIN 50 UNITS: 100 SYRINGE at 09:29

## 2025-05-13 RX ADMIN — OXYCODONE HYDROCHLORIDE 10 MG: 10 TABLET ORAL at 06:26

## 2025-05-13 RX ADMIN — CALCIUM CARBONATE (ANTACID) CHEW TAB 500 MG 500 MG: 500 CHEW TAB at 09:28

## 2025-05-13 RX ADMIN — Medication 10 ML: at 09:31

## 2025-05-13 RX ADMIN — CEFAZOLIN SODIUM 2 G: 2 INJECTION, SOLUTION INTRAVENOUS at 06:21

## 2025-05-13 RX ADMIN — CEFAZOLIN SODIUM 2 G: 2 INJECTION, SOLUTION INTRAVENOUS at 14:09

## 2025-05-13 RX ADMIN — OXYCODONE HYDROCHLORIDE AND ACETAMINOPHEN 500 MG: 500 TABLET ORAL at 09:28

## 2025-05-13 RX ADMIN — OXYCODONE HYDROCHLORIDE 10 MG: 10 TABLET ORAL at 14:17

## 2025-05-13 RX ADMIN — CYCLOBENZAPRINE 10 MG: 10 TABLET, FILM COATED ORAL at 09:28

## 2025-05-13 RX ADMIN — PANTOPRAZOLE SODIUM 40 MG: 20 TABLET, DELAYED RELEASE ORAL at 06:21

## 2025-05-13 RX ADMIN — ACETAMINOPHEN 650 MG: 325 TABLET ORAL at 09:28

## 2025-05-13 RX ADMIN — IBUPROFEN 600 MG: 600 TABLET, FILM COATED ORAL at 09:28

## 2025-05-13 ASSESSMENT — PAIN SCALES - GENERAL
PAINLEVEL_OUTOF10: 7
PAINLEVEL_OUTOF10: 0 - NO PAIN
PAINLEVEL_OUTOF10: 6
PAINLEVEL_OUTOF10: 7
PAINLEVEL_OUTOF10: 4

## 2025-05-13 ASSESSMENT — COGNITIVE AND FUNCTIONAL STATUS - GENERAL
MOBILITY SCORE: 24
DAILY ACTIVITIY SCORE: 24

## 2025-05-13 ASSESSMENT — PAIN - FUNCTIONAL ASSESSMENT
PAIN_FUNCTIONAL_ASSESSMENT: 0-10

## 2025-05-13 ASSESSMENT — PAIN DESCRIPTION - LOCATION
LOCATION: HAND
LOCATION: HAND

## 2025-05-13 ASSESSMENT — PAIN DESCRIPTION - ORIENTATION: ORIENTATION: RIGHT

## 2025-05-13 NOTE — PROGRESS NOTES
"Sundar Gentile is a 59 y.o. male on day 6 of admission presenting with Wound infection.    Subjective   He reports significant decrease in his pain.  He is not being well-controlled with oral pain medication.  He states that he has maintained dressing and splint as instructed.  He is tolerating his diet.  He reports bowel movement.  He feels that he is now ready to be discharged home.       Objective     Physical Exam  On physical examination he is alert and oriented x 3, pleasant and cooperative, in no acute distress.    Right hand is examined.  The dressing splint removed.  Wound is clean and dry.  The distal tips of all 5 digits are pink, warm, capillary refill 1 to 2 seconds.  The distal tips of all 5 digits are intact to sensation of touch.  The entire hand is intact to sensation of touch.  She demonstrates full range of motion of all 5 digits and the wrist.  There is trace edema.  There is no erythema.  There is no ascending lymphangitis.  There is minimal discomfort to palpation.  There is minimal serosanguineous drainage.  There is no purulent drainage.  The operative site is clean and intact.  Sutures are clean dry and intact.  There are no Knievel signs.  Radial and ulnar pulses are +2/4 and palpable.  The rotation/transposition flap is pink, warm, capillary refill 1 to 2 seconds.  The remainder the exam is within normal limits for him.  Last Recorded Vitals  Blood pressure (!) 161/112, pulse 90, temperature 36 °C (96.8 °F), temperature source Temporal, resp. rate 14, height 1.803 m (5' 10.98\"), weight 108 kg (237 lb 3.4 oz), SpO2 98%.  Intake/Output last 3 Shifts:  I/O last 3 completed shifts:  In: 1370 (12.7 mL/kg) [P.O.:1070; IV Piggyback:300]  Out: - (0 mL/kg)   Weight: 107.6 kg     Relevant Results    Scheduled medications  Scheduled Medications[1]  Continuous medications  Continuous Medications[2]  PRN medications  PRN Medications[3]    Labs  Results for orders placed or performed during the hospital " encounter of 05/07/25 (from the past 24 hours)   CBC and Auto Differential   Result Value Ref Range    WBC 6.8 4.4 - 11.3 x10*3/uL    nRBC 0.0 0.0 - 0.0 /100 WBCs    RBC 4.52 4.50 - 5.90 x10*6/uL    Hemoglobin 13.8 13.5 - 17.5 g/dL    Hematocrit 42.5 41.0 - 52.0 %    MCV 94 80 - 100 fL    MCH 30.5 26.0 - 34.0 pg    MCHC 32.5 32.0 - 36.0 g/dL    RDW 12.7 11.5 - 14.5 %    Platelets 266 150 - 450 x10*3/uL    Neutrophils % 58.7 40.0 - 80.0 %    Immature Granulocytes %, Automated 0.6 0.0 - 0.9 %    Lymphocytes % 27.2 13.0 - 44.0 %    Monocytes % 9.5 2.0 - 10.0 %    Eosinophils % 3.1 0.0 - 6.0 %    Basophils % 0.9 0.0 - 2.0 %    Neutrophils Absolute 4.00 1.20 - 7.70 x10*3/uL    Immature Granulocytes Absolute, Automated 0.04 0.00 - 0.70 x10*3/uL    Lymphocytes Absolute 1.85 1.20 - 4.80 x10*3/uL    Monocytes Absolute 0.65 0.10 - 1.00 x10*3/uL    Eosinophils Absolute 0.21 0.00 - 0.70 x10*3/uL    Basophils Absolute 0.06 0.00 - 0.10 x10*3/uL   Basic metabolic panel   Result Value Ref Range    Glucose 103 (H) 74 - 99 mg/dL    Sodium 139 136 - 145 mmol/L    Potassium 3.8 3.5 - 5.3 mmol/L    Chloride 103 98 - 107 mmol/L    Bicarbonate 27 21 - 32 mmol/L    Anion Gap 13 10 - 20 mmol/L    Urea Nitrogen 12 6 - 23 mg/dL    Creatinine 0.99 0.50 - 1.30 mg/dL    eGFR 87 >60 mL/min/1.73m*2    Calcium 9.2 8.6 - 10.3 mg/dL        Imaging  Imaging  XR chest 1 view  Result Date: 5/7/2025  1.  No evidence of acute cardiopulmonary process. Left-sided PICC line with the tip in the atriocaval junction.       MACRO: None   Signed by: Josué Nunez 5/7/2025 6:50 PM Dictation workstation:   [id number]      Cardiology, Vascular, and Other Imaging  Bedside PICC Imaging  Result Date: 5/7/2025  These images are not reportable by radiology and will not be interpreted by  Radiologists.           Assessment/Plan   Assessment & Plan  Wound infection    Surgical wound infection    Wound dehiscence, surgical, subsequent encounter    Other specified  disorders of synovium, right wrist    Postop day #4 status post exploration, irrigation, excisional debridement devitalized skin subcutaneous tissue and radical excision ulnar, mid palm, radial bursa and radical flexor synovectomy of the wrist and hand level of all flexor tendons with Penrose drain placement and rotational/transposition flap closure.  1.  The wound is further cleaned and dried appropriate sterile dressing and splint reapplied.  He is instructed appropriate care of this wound keep it clean dry and protected to wash twice a day with gentle soap and water.  He is instructed in appropriate dressing care for this wound.  He is understands all of his instructions.  He will perform dressing care twice a day at home.  He may be discharged home today.  2.  Continue IV antibiotics as directed by infectious disease specialist.  We have arranged home health care for IV antibiotics through his PICC line as directed by infectious disease  3.  Pain control is improved and he may continue with oral pain medication as prescribed at home  4.  Cuticular regular diet Colace as needed for constipation  5.  May ambulate and shower if wound and PICC line are protected  6.  He definitely demonstrates significant improvement and may be discharged home with the appropriate care at this time.  He will follow-up with me on Thursday, 5/15/2025.  He may call [phone] to arrange follow-up appointment and for any issues, problems, or concerns.       I spent 40 minutes in the professional and overall care of this patient.      Mayur Williamson MD           [1] ascorbic acid, 500 mg, oral, Daily  atorvastatin, 20 mg, oral, Nightly  calcium carbonate, 1 tablet, oral, Daily  ceFAZolin, 2 g, intravenous, q8h  heparin flush, 0.5 mL, intravenous, Daily  losartan, 100 mg, oral, Nightly  pantoprazole, 40 mg, oral, Daily before breakfast  sodium chloride 0.9%, 10 mL, intravenous, BID    [2]    [3] PRN medications: acetaminophen **OR**  acetaminophen **OR** acetaminophen, alteplase, amphetamine-dextroamphetamine, cyclobenzaprine, docusate sodium, ibuprofen, morphine, oxyCODONE, oxyCODONE

## 2025-05-13 NOTE — HH CARE COORDINATION
Home Care received a Referral to Resume Care for Infusion and Nursing. We have processed the referral for a Resumption of Care on 05/14/2025.     If you have any questions or concerns, please feel free to contact us at [phone]. Follow the prompts, enter your five digit zip code, and you will be directed to your care team on [facility].

## 2025-05-13 NOTE — PROGRESS NOTES
05/13/25 0917   Discharge Planning   Living Arrangements Spouse/significant other   Support Systems Spouse/significant other   Type of Residence Private residence   Home or Post Acute Services In home services   Type of Home Care Services Home nursing visits   Expected Discharge Disposition Home Health   Does the patient need discharge transport arranged? No   Patient Choice   Patient / Family choosing to utilize agency / facility established prior to hospitalization Yes   Stroke Family Assessment   Stroke Family Assessment Needed No   Intensity of Service   Intensity of Service 0-30 min     Met with the patient in the room, he state that he plans to return home with [facility] IV infusion. He  states that he was active with [facility] infusion prior to admission. He and his wife administer the medications as prescribed. Notified home infusion intake.   3:00 pm [facility] infusion has received orders, plan is for delivery at 9 pm to his home for the medication. [facility] will resume care in the am. Patient and his wife are aware of the time of delivery and for [facility] to come out in the am.

## 2025-05-13 NOTE — PROGRESS NOTES
" INPATIENT PROGRESS NOTES    PATIENT NAME: Sundar Gentile    MRN: [id number]  SERVICE DATE:  5/13/2025   SERVICE TIME:  3:38 PM    SIGNATURE: Jey Pereira MD      ASSESSMENT :   -Right hand surgical wound infection  -Recent culture grew MSSA  -Status post debridement on 5/9--> OR cultures NG  -Previous wound culture for Enterobacter cloacae treated with approximately 4 weeks of ertapenem  -Status post right hand carpal tunnel surgery on 2/21  -History of hypertension, hyperlipidemia     PLAN:  - Continue current antibiotics, no reported side effects  - Closely monitor for antibiotic side effects including CDI, KAI, thrombocytopenia, etc.              SUBJECTIVE  Afebrile  No events overnight       OBJECTIVE  PHYSICAL EXAM:   Patient Vitals for the past 24 hrs:   BP Temp Temp src Pulse Resp SpO2   05/13/25 0800 (!) 161/112 36 °C (96.8 °F) Temporal 90 14 98 %   05/13/25 0000 116/69 36.5 °C (97.7 °F) Temporal 68 16 95 %   05/12/25 2000 (!) 146/91 36.1 °C (97 °F) Temporal 84 16 94 %   05/12/25 1600 (!) 146/98 36.9 °C (98.4 °F) Temporal 87 16 97 %     R hand is wrapped       Labs:  Lab Results   Component Value Date    WBC 6.8 05/13/2025    HGB 13.8 05/13/2025    HCT 42.5 05/13/2025    MCV 94 05/13/2025     05/13/2025     Lab Results   Component Value Date    GLUCOSE 103 (H) 05/13/2025    CALCIUM 9.2 05/13/2025     05/13/2025    K 3.8 05/13/2025    CO2 27 05/13/2025     05/13/2025    BUN 12 05/13/2025    CREATININE 0.99 05/13/2025   ESR: --  Lab Results   Component Value Date    SEDRATE <1 09/06/2024     Lab Results   Component Value Date    CRP <3.0 04/17/2025   No results found for: \"ALT\", \"AST\", \"GGT\", \"ALKPHOS\", \"BILITOT\"      DATA:   Diagnostic tests reviewed for today's visit:    Labs this admission reviewed  Imagings this admission reviewed  Cultures: Reviewed        Jey Pereira MD.   Infectious Disease Attending            This note was partially created using voice recognition software and " "is inherently subject to errors including those of syntax and \"sound-alike\" substitutions which may escape proofreading. In such instances, original meaning may be extrapolated by contextual derivation       "

## 2025-05-13 NOTE — CARE PLAN
The patient's goals for the shift include      The clinical goals for the shift include see care plan      Problem: Fall/Injury  Goal: Not fall by end of shift  Outcome: Met  Goal: Be free from injury by end of the shift  Outcome: Met  Goal: Verbalize understanding of personal risk factors for fall in the hospital  Outcome: Met  Goal: Verbalize understanding of risk factor reduction measures to prevent injury from fall in the home  Outcome: Met  Goal: Use assistive devices by end of the shift  Outcome: Met  Goal: Pace activities to prevent fatigue by end of the shift  Outcome: Met     Problem: Pain  Goal: Takes deep breaths with improved pain control throughout the shift  Outcome: Met  Goal: Turns in bed with improved pain control throughout the shift  Outcome: Met  Goal: Walks with improved pain control throughout the shift  Outcome: Met  Goal: Performs ADL's with improved pain control throughout shift  Outcome: Met  Goal: Participates in PT with improved pain control throughout the shift  Outcome: Met  Goal: Free from opioid side effects throughout the shift  Outcome: Met  Goal: Free from acute confusion related to pain meds throughout the shift  Outcome: Met     Problem: Pain - Adult  Goal: Verbalizes/displays adequate comfort level or baseline comfort level  Outcome: Met     Problem: Safety - Adult  Goal: Free from fall injury  Outcome: Met     Problem: Discharge Planning  Goal: Discharge to home or other facility with appropriate resources  Outcome: Met     Problem: Chronic Conditions and Co-morbidities  Goal: Patient's chronic conditions and co-morbidity symptoms are monitored and maintained or improved  Outcome: Met     Problem: Nutrition  Goal: Nutrient intake appropriate for maintaining nutritional needs  Outcome: Met     Problem: Skin  Goal: Decreased wound size/increased tissue granulation at next dressing change  Outcome: Met  Flowsheets (Taken 5/13/2025 5975)  Decreased wound size/increased tissue  granulation at next dressing change: Promote sleep for wound healing  Goal: Promote skin healing  Outcome: Met  Flowsheets (Taken 5/13/2025 1620)  Promote skin healing: Assess skin/pad under line(s)/device(s)     EOS note: Pt oriented x4 and pleasant. Ambulated independently in the room and hallway. Oxycodone given for pain related to hand. Dr. Williamson rewrapped hand today and evaluated patient at bedside. Received last dose of abx at hospital, per , pt is okay to miss evening dose and resume tomorrow morning with C., No further concerns prior to discharge. Went over all discharge paperwork, provided callback number. M2b deliveredd.d

## 2025-05-13 NOTE — CARE PLAN
Problem: Fall/Injury  Goal: Be free from injury by end of the shift  Outcome: Progressing     Problem: Pain  Goal: Performs ADL's with improved pain control throughout shift  Outcome: Progressing     Problem: Discharge Planning  Goal: Discharge to home or other facility with appropriate resources  Outcome: Progressing     Problem: Chronic Conditions and Co-morbidities  Goal: Patient's chronic conditions and co-morbidity symptoms are monitored and maintained or improved  Outcome: Progressing   The patient's goals for the shift include  prepare for discharge tomorrow     The clinical goals for the shift include maintain adequate pain control throughout shift

## 2025-05-13 NOTE — PROGRESS NOTES
REVIEWED PT INFO AS CORRECT.   DX: MSSA surgical wound infection   REVIEWED ALLERGIES: Lodine, sutures  PMH: Right Hand Carpal Tunnel Surgery 2/21  NO MEDICATION INTERACTIONS.  REVIEWED RELEVANT BASELINE VALUES  LABS: Weekly CRP, BUN, Creatinine, CBC/Diff, LFTs - results to Dr Pereira  PT HAS SL PICC  FLUSH PER [facility] PROTOCOL.  CONTINUE MED THRU TENTATIVE STOP DATE:  6/4    Cefazolin 2g IV push q8h    CARE PLAN DONE TODAY    Patient is 59 year old male known to homecare for previous course of IV antibiotics.  Repeat cultures of surgical site positive for MSSA - He is to complete course of Cefazolin at home  ABT appropriate for culture results    Labs and follow up with Dr Pereira- follow up appt 5/22/25 w/ Dr Pereira    Tel call with patient and wife. Delivery by 9p tonight confirmed. Address confirmed: [address] ; [place] [address]. All questions answered.    Processed fill for 21 x cefazolin syringes for mix and straight delivery 5/13 to cover doses 5/14 thru 5/20/25.    Follow up 5/19/25 with next fill ovn. Check progress.

## 2025-05-14 ENCOUNTER — HOME CARE VISIT (OUTPATIENT)
Dept: HOME HEALTH SERVICES | Facility: HOME HEALTH | Age: 60
End: 2025-05-14
Payer: COMMERCIAL

## 2025-05-14 VITALS
SYSTOLIC BLOOD PRESSURE: 128 MMHG | DIASTOLIC BLOOD PRESSURE: 74 MMHG | TEMPERATURE: 98.4 F | HEART RATE: 72 BPM | RESPIRATION RATE: 20 BRPM | OXYGEN SATURATION: 99 %

## 2025-05-14 PROCEDURE — G0299 HHS/HOSPICE OF RN EA 15 MIN: HCPCS

## 2025-05-14 ASSESSMENT — PAIN SCALES - PAIN ASSESSMENT IN ADVANCED DEMENTIA (PAINAD)
FACIALEXPRESSION: 0
BREATHING: 0
BODYLANGUAGE: 0 - RELAXED.
NEGVOCALIZATION: 0 - NONE.
NEGVOCALIZATION: 0
CONSOLABILITY: 0
TOTALSCORE: 0
FACIALEXPRESSION: 0 - SMILING OR INEXPRESSIVE.
CONSOLABILITY: 0 - NO NEED TO CONSOLE.
BODYLANGUAGE: 0

## 2025-05-14 ASSESSMENT — ENCOUNTER SYMPTOMS
CHANGE IN APPETITE: UNCHANGED
PERSON REPORTING PAIN: PATIENT
LAST BOWEL MOVEMENT: 67339
PAIN: 1
PAIN LOCATION: RIGHT HAND
APPETITE LEVEL: FAIR
OCCASIONAL FEELINGS OF UNSTEADINESS: 0

## 2025-05-14 ASSESSMENT — ACTIVITIES OF DAILY LIVING (ADL)
ENTERING_EXITING_HOME: MODERATE ASSIST
OASIS_M1830: 06

## 2025-05-15 NOTE — HOME HEALTH
sKILLED NURSING VISIT FOR RESUMPTION OF CARE ASSESSMENT TEACHING OF ADMINISTRATION OF IV ANTIBIOTIC VIA SYRINGE. PT TOLERATED PROCEDURES WELL. SPOUSE WILL BE CHANGING THE RIGHT HAND DRESSING DAILY PER MD ORDERS. SPOUSE INDEPENDENT WITH ALL FACETS OF HIVT.

## 2025-05-16 NOTE — DISCHARGE SUMMARY
Discharge Diagnosis  Surgical wound infection and dehiscence    Issues Requiring Follow-Up  Continue wound care and home IV antibiotics as directed    Test Results Pending At Discharge  Pending Labs       Order Current Status    Surgical Pathology Exam In process            Hospital Course   He was admitted to the hospital with surgical wound infection and dehiscence.  Consultation with infectious disease was performed.  PICC line was placed and IV antibiotics were started.  He was then taken to the operating room for excisional debridement devitalized skin subcutaneous tissue and radical ulnar, mid palm, radial bursectomy and radical flexor synovectomy at the wrist.  He continued hospitalization postoperatively for pain control, wound care, and IV antibiotics.  Once his clinical situation improved and his pain was controlled, the the drain was removed from the wound, and home IV antibiotic therapy was arranged and he was discharged to home.    Pertinent Physical Exam At Time of Discharge  Physical Exam see my progress note on day of discharge    Home Medications     Medication List      START taking these medications     ceFAZolin IVPB; Commonly known as: Ancef; Infuse 100 mL (2 g) over 30   minutes into a venous catheter every 8 hours for 22 days.   oxyCODONE 10 mg immediate release tablet; Commonly known as: Roxicodone;   Take 1 tablet (10 mg) by mouth every 6 hours if needed for severe pain (7   - 10)     CONTINUE taking these medications     amphetamine-dextroamphetamine 10 mg tablet; Commonly known as: Adderall   atorvastatin 20 mg tablet; Commonly known as: Lipitor   calcium-magnesium 300-300 mg tablet   cyclobenzaprine 10 mg tablet; Commonly known as: Flexeril   DHEA 50 mg capsule; Generic drug: prasterone (dhea)   Fish OiL 1,200 (144-216) mg capsule; Generic drug: omega 3-dha-epa-fish   oil   FRUIT AND VEGETABLE DAILY ORAL   heparin flush 10 unit/mL injection   losartan 100 mg tablet; Commonly known as:  Cozaar   magnesium glycinate 100 mg tablet   multivitamin with minerals iron-free; Commonly known as: Centrum Silver   naproxen 500 mg tablet; Commonly known as: Naprosyn   potassium gluconate 595 mg (99 mg) tablet   SEMAGLUTIDE (WEIGHT LOSS) SUBQ   sodium chloride 0.9% flush   tadalafil 10 mg tablet; Commonly known as: Cialis   Vitamin C 500 mg chewable tablet; Generic drug: ascorbic acid     STOP taking these medications     ertapenem 1 gram injection; Commonly known as: INVanz   esomeprazole 20 mg DR capsule; Commonly known as: NexIUM   oxyCODONE-acetaminophen 7.5-325 mg tablet; Commonly known as: Percocet     ASK your doctor about these medications     alteplase 2 mg injection; Commonly known as: Cathflo Activase; 2 mL (2   mg) by intra-catheter route 1 time for 1 dose.; Ask about: Should I take   this medication?       Outpatient Follow-Up  Future Appointments   Date Time Provider Department Center   5/20/2025 To Be Determined Jerrod Prasad RN [facility]   5/27/2025 To Be Determined Jerrod Prasad RN [facility]   6/3/2025 To Be Determined Jerrod Prasad RN [facility]   2/9/2026  1:15 PM MD Aj Whatley None       Mayur Williamson MD

## 2025-05-19 ENCOUNTER — HOME INFUSION (OUTPATIENT)
Dept: INFUSION THERAPY | Age: 60
End: 2025-05-19
Payer: COMMERCIAL

## 2025-05-19 NOTE — PROGRESS NOTES
Reviewed chart and Patient is 59 year old male known to homecare for previous course of IV antibiotics.  Repeat cultures of surgical site positive for MSSA - He is to complete course of Cefazolin at home through 06/04/25  ABT appropriate for culture results     Labs and follow up with Dr Pereira- follow up appt 5/22/25 w/ Dr Pereira    Labs reviewed for baseline.      Tel call with patient and wife. Delivery by 9p on Tuesday is acceptable. Has appt with Dr Pereira on 05/22/25 but will continue meds through 06/04 due to previous gap in therapy and return of infection. Requestts all supplies and flushes plus extra IV nettings with this delivery. All questions answered.     Processed refill for 24 x cefazolin syringes for 05/19 mix and OVN delivery to cover doses 5/21 thru 5/28/25.     Follow up 5/27/25 with next fill ovn. Check progress.

## 2025-05-20 ENCOUNTER — HOME CARE VISIT (OUTPATIENT)
Dept: HOME HEALTH SERVICES | Facility: HOME HEALTH | Age: 60
End: 2025-05-20
Payer: COMMERCIAL

## 2025-05-20 VITALS
RESPIRATION RATE: 18 BRPM | SYSTOLIC BLOOD PRESSURE: 153 MMHG | HEART RATE: 71 BPM | OXYGEN SATURATION: 97 % | DIASTOLIC BLOOD PRESSURE: 99 MMHG | TEMPERATURE: 97.3 F

## 2025-05-20 DIAGNOSIS — T81.49XA WOUND INFECTION AFTER SURGERY: ICD-10-CM

## 2025-05-20 PROCEDURE — G0299 HHS/HOSPICE OF RN EA 15 MIN: HCPCS

## 2025-05-20 RX ADMIN — Medication 5 ML: at 11:33

## 2025-05-20 RX ADMIN — Medication 10 ML: at 11:10

## 2025-05-20 RX ADMIN — Medication 20 ML: at 11:31

## 2025-05-20 ASSESSMENT — ENCOUNTER SYMPTOMS
DENIES PAIN: 1
LIMITED RANGE OF MOTION: 1
PERSON REPORTING PAIN: PATIENT
MUSCLE WEAKNESS: 1
CHANGE IN APPETITE: UNCHANGED
APPETITE LEVEL: GOOD

## 2025-05-21 LAB
ALBUMIN SERPL-MCNC: 4.3 G/DL (ref 3.6–5.1)
ALBUMIN/GLOB SERPL: 2 (CALC) (ref 1–2.5)
ALP SERPL-CCNC: 85 U/L (ref 35–144)
ALT SERPL-CCNC: 7 U/L (ref 9–46)
AST SERPL-CCNC: 16 U/L (ref 10–35)
BASOPHILS # BLD AUTO: 73 CELLS/UL (ref 0–200)
BASOPHILS NFR BLD AUTO: 1.1 %
BILIRUB DIRECT SERPL-MCNC: 0.1 MG/DL
BILIRUB INDIRECT SERPL-MCNC: 0.3 MG/DL (CALC) (ref 0.2–1.2)
BILIRUB SERPL-MCNC: 0.4 MG/DL (ref 0.2–1.2)
BUN SERPL-MCNC: 12 MG/DL (ref 7–25)
BUN/CREAT SERPL: 19 (CALC) (ref 6–22)
CALCIUM SERPL-MCNC: 9.3 MG/DL (ref 8.6–10.3)
CHLORIDE SERPL-SCNC: 103 MMOL/L (ref 98–110)
CO2 SERPL-SCNC: 27 MMOL/L (ref 20–32)
CREAT SERPL-MCNC: 0.64 MG/DL (ref 0.7–1.35)
CRP SERPL-MCNC: <3 MG/L
EGFRCR SERPLBLD CKD-EPI 2021: 108 ML/MIN/1.73M2
EOSINOPHIL # BLD AUTO: 152 CELLS/UL (ref 15–500)
EOSINOPHIL NFR BLD AUTO: 2.3 %
ERYTHROCYTE [DISTWIDTH] IN BLOOD BY AUTOMATED COUNT: 12.6 % (ref 11–15)
GLOBULIN SER CALC-MCNC: 2.2 G/DL (CALC) (ref 1.9–3.7)
GLUCOSE SERPL-MCNC: 86 MG/DL (ref 65–99)
HCT VFR BLD AUTO: 40.8 % (ref 38.5–50)
HGB BLD-MCNC: 13.5 G/DL (ref 13.2–17.1)
LYMPHOCYTES # BLD AUTO: 1610 CELLS/UL (ref 850–3900)
LYMPHOCYTES NFR BLD AUTO: 24.4 %
MCH RBC QN AUTO: 31 PG (ref 27–33)
MCHC RBC AUTO-ENTMCNC: 33.1 G/DL (ref 32–36)
MCV RBC AUTO: 93.6 FL (ref 80–100)
MONOCYTES # BLD AUTO: 653 CELLS/UL (ref 200–950)
MONOCYTES NFR BLD AUTO: 9.9 %
NEUTROPHILS # BLD AUTO: 4112 CELLS/UL (ref 1500–7800)
NEUTROPHILS NFR BLD AUTO: 62.3 %
PLATELET # BLD AUTO: 308 THOUSAND/UL (ref 140–400)
PMV BLD REES-ECKER: 10.3 FL (ref 7.5–12.5)
POTASSIUM SERPL-SCNC: 4.4 MMOL/L (ref 3.5–5.3)
PROT SERPL-MCNC: 6.5 G/DL (ref 6.1–8.1)
RBC # BLD AUTO: 4.36 MILLION/UL (ref 4.2–5.8)
SODIUM SERPL-SCNC: 139 MMOL/L (ref 135–146)
WBC # BLD AUTO: 6.6 THOUSAND/UL (ref 3.8–10.8)

## 2025-05-23 ENCOUNTER — HOME INFUSION (OUTPATIENT)
Dept: INFUSION THERAPY | Age: 60
End: 2025-05-23
Payer: COMMERCIAL

## 2025-05-23 NOTE — PROGRESS NOTES
Review of chart. Patient with order for cefazolin 2gm q8h thru 6/4/25 for a right hand surgical wound infection with MSSA. Weekly labs are ordered with results to dr Pereira.    Review of labs from 5/20/25. Unremarkable.    Review of rn visit notes. No problems noted with infusions or line.    Processed fill for 21 x cefazolin syringes for mix and ovn delivery 5/27/25 to cover doses 5/29 thru 6/4/25. *pt rep to check supply needs with patient*    Follow up 6/4/25 to check plan of care with Dr Pereira. Patient has a picc.

## 2025-05-27 ENCOUNTER — HOME CARE VISIT (OUTPATIENT)
Dept: HOME HEALTH SERVICES | Facility: HOME HEALTH | Age: 60
End: 2025-05-27
Payer: COMMERCIAL

## 2025-05-27 VITALS
TEMPERATURE: 98.4 F | SYSTOLIC BLOOD PRESSURE: 144 MMHG | HEART RATE: 66 BPM | DIASTOLIC BLOOD PRESSURE: 86 MMHG | OXYGEN SATURATION: 97 %

## 2025-05-27 PROCEDURE — G0299 HHS/HOSPICE OF RN EA 15 MIN: HCPCS

## 2025-05-27 RX ADMIN — Medication 30 ML: at 12:00

## 2025-05-27 RX ADMIN — Medication 5 ML: at 12:02

## 2025-05-27 ASSESSMENT — ENCOUNTER SYMPTOMS
APPETITE LEVEL: GOOD
HEADACHES: 1
HYPERTENSION: 1
LIMITED RANGE OF MOTION: 1

## 2025-06-03 ENCOUNTER — HOME INFUSION (OUTPATIENT)
Dept: INFUSION THERAPY | Age: 60
End: 2025-06-03
Payer: COMMERCIAL

## 2025-06-03 ENCOUNTER — HOME CARE VISIT (OUTPATIENT)
Dept: HOME HEALTH SERVICES | Facility: HOME HEALTH | Age: 60
End: 2025-06-03
Payer: COMMERCIAL

## 2025-06-03 VITALS
RESPIRATION RATE: 16 BRPM | TEMPERATURE: 97 F | HEART RATE: 73 BPM | OXYGEN SATURATION: 97 % | SYSTOLIC BLOOD PRESSURE: 136 MMHG | DIASTOLIC BLOOD PRESSURE: 83 MMHG

## 2025-06-03 DIAGNOSIS — T81.31XD WOUND DEHISCENCE, SURGICAL, SUBSEQUENT ENCOUNTER: Primary | ICD-10-CM

## 2025-06-03 PROCEDURE — G0299 HHS/HOSPICE OF RN EA 15 MIN: HCPCS

## 2025-06-03 RX ADMIN — Medication 10 ML: at 16:17

## 2025-06-03 ASSESSMENT — ACTIVITIES OF DAILY LIVING (ADL)
HOME_HEALTH_OASIS: 01
OASIS_M1830: 00

## 2025-06-03 ASSESSMENT — ENCOUNTER SYMPTOMS
PERSON REPORTING PAIN: PATIENT
DENIES PAIN: 1

## 2025-06-03 NOTE — PROGRESS NOTES
Received orders from Dr Colmenares (covering for Dr Pereira)  Stop antibiotic as ordered after today's dose. HC RN to remove line.     Order entered into Epic. Gold copy to intake.     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 RN to pull line shortly.    Pt care rep to discharge pt from [facility] and discharge chart

## 2025-08-19 ENCOUNTER — OFFICE (OUTPATIENT)
Dept: URBAN - METROPOLITAN AREA CLINIC 26 | Facility: CLINIC | Age: 60
End: 2025-08-19
Payer: COMMERCIAL

## 2025-08-19 VITALS
DIASTOLIC BLOOD PRESSURE: 92 MMHG | WEIGHT: 237 LBS | HEART RATE: 73 BPM | SYSTOLIC BLOOD PRESSURE: 137 MMHG | TEMPERATURE: 98.6 F | HEIGHT: 71 IN

## 2025-08-19 DIAGNOSIS — K22.70 BARRETT'S ESOPHAGUS WITHOUT DYSPLASIA: ICD-10-CM

## 2025-08-19 DIAGNOSIS — K21.9 GASTRO-ESOPHAGEAL REFLUX DISEASE WITHOUT ESOPHAGITIS: ICD-10-CM

## 2025-08-19 DIAGNOSIS — K92.1 MELENA: ICD-10-CM

## 2025-08-19 PROCEDURE — 99204 OFFICE O/P NEW MOD 45 MIN: CPT | Performed by: INTERNAL MEDICINE

## 2025-08-26 ENCOUNTER — AMBULATORY SURGICAL CENTER (OUTPATIENT)
Dept: URBAN - METROPOLITAN AREA SURGERY 12 | Facility: SURGERY | Age: 60
End: 2025-08-26
Payer: COMMERCIAL

## 2025-08-26 ENCOUNTER — OFFICE (OUTPATIENT)
Dept: URBAN - METROPOLITAN AREA PATHOLOGY 2 | Facility: PATHOLOGY | Age: 60
End: 2025-08-26
Payer: COMMERCIAL

## 2025-08-26 VITALS
DIASTOLIC BLOOD PRESSURE: 76 MMHG | OXYGEN SATURATION: 99 % | RESPIRATION RATE: 14 BRPM | OXYGEN SATURATION: 100 % | DIASTOLIC BLOOD PRESSURE: 91 MMHG | DIASTOLIC BLOOD PRESSURE: 82 MMHG | SYSTOLIC BLOOD PRESSURE: 158 MMHG | DIASTOLIC BLOOD PRESSURE: 87 MMHG | HEART RATE: 62 BPM | HEIGHT: 71 IN | DIASTOLIC BLOOD PRESSURE: 79 MMHG | DIASTOLIC BLOOD PRESSURE: 75 MMHG | RESPIRATION RATE: 14 BRPM | OXYGEN SATURATION: 96 % | SYSTOLIC BLOOD PRESSURE: 145 MMHG | DIASTOLIC BLOOD PRESSURE: 81 MMHG | SYSTOLIC BLOOD PRESSURE: 177 MMHG | HEART RATE: 63 BPM | SYSTOLIC BLOOD PRESSURE: 130 MMHG | RESPIRATION RATE: 24 BRPM | HEART RATE: 63 BPM | HEART RATE: 73 BPM | DIASTOLIC BLOOD PRESSURE: 68 MMHG | TEMPERATURE: 98 F | SYSTOLIC BLOOD PRESSURE: 130 MMHG | DIASTOLIC BLOOD PRESSURE: 69 MMHG | SYSTOLIC BLOOD PRESSURE: 154 MMHG | HEART RATE: 72 BPM | HEART RATE: 76 BPM | HEART RATE: 79 BPM | HEART RATE: 54 BPM | RESPIRATION RATE: 15 BRPM | HEART RATE: 57 BPM | RESPIRATION RATE: 10 BRPM | HEART RATE: 59 BPM | WEIGHT: 232 LBS | RESPIRATION RATE: 13 BRPM | DIASTOLIC BLOOD PRESSURE: 65 MMHG | OXYGEN SATURATION: 100 % | DIASTOLIC BLOOD PRESSURE: 75 MMHG | HEART RATE: 66 BPM | RESPIRATION RATE: 23 BRPM | SYSTOLIC BLOOD PRESSURE: 122 MMHG | HEART RATE: 66 BPM | HEART RATE: 77 BPM | WEIGHT: 232 LBS | SYSTOLIC BLOOD PRESSURE: 142 MMHG | HEART RATE: 54 BPM | HEART RATE: 72 BPM | DIASTOLIC BLOOD PRESSURE: 79 MMHG | HEART RATE: 79 BPM | RESPIRATION RATE: 17 BRPM | SYSTOLIC BLOOD PRESSURE: 172 MMHG | HEART RATE: 77 BPM | DIASTOLIC BLOOD PRESSURE: 71 MMHG | SYSTOLIC BLOOD PRESSURE: 142 MMHG | HEART RATE: 78 BPM | WEIGHT: 232 LBS | SYSTOLIC BLOOD PRESSURE: 141 MMHG | HEIGHT: 71 IN | RESPIRATION RATE: 19 BRPM | RESPIRATION RATE: 20 BRPM | HEART RATE: 74 BPM | DIASTOLIC BLOOD PRESSURE: 65 MMHG | HEART RATE: 70 BPM | OXYGEN SATURATION: 98 % | HEART RATE: 77 BPM | HEART RATE: 74 BPM | HEART RATE: 73 BPM | RESPIRATION RATE: 10 BRPM | HEART RATE: 73 BPM | HEART RATE: 70 BPM | SYSTOLIC BLOOD PRESSURE: 146 MMHG | SYSTOLIC BLOOD PRESSURE: 139 MMHG | SYSTOLIC BLOOD PRESSURE: 147 MMHG | HEART RATE: 79 BPM | RESPIRATION RATE: 34 BRPM | HEART RATE: 76 BPM | HEART RATE: 75 BPM | HEART RATE: 62 BPM | DIASTOLIC BLOOD PRESSURE: 81 MMHG | RESPIRATION RATE: 17 BRPM | SYSTOLIC BLOOD PRESSURE: 172 MMHG | HEART RATE: 59 BPM | HEART RATE: 57 BPM | SYSTOLIC BLOOD PRESSURE: 158 MMHG | DIASTOLIC BLOOD PRESSURE: 76 MMHG | RESPIRATION RATE: 19 BRPM | DIASTOLIC BLOOD PRESSURE: 71 MMHG | HEART RATE: 72 BPM | HEART RATE: 63 BPM | RESPIRATION RATE: 34 BRPM | RESPIRATION RATE: 15 BRPM | RESPIRATION RATE: 28 BRPM | DIASTOLIC BLOOD PRESSURE: 70 MMHG | RESPIRATION RATE: 20 BRPM | SYSTOLIC BLOOD PRESSURE: 139 MMHG | SYSTOLIC BLOOD PRESSURE: 128 MMHG | DIASTOLIC BLOOD PRESSURE: 88 MMHG | RESPIRATION RATE: 15 BRPM | HEART RATE: 75 BPM | SYSTOLIC BLOOD PRESSURE: 142 MMHG | SYSTOLIC BLOOD PRESSURE: 141 MMHG | DIASTOLIC BLOOD PRESSURE: 66 MMHG | OXYGEN SATURATION: 98 % | DIASTOLIC BLOOD PRESSURE: 71 MMHG | DIASTOLIC BLOOD PRESSURE: 70 MMHG | SYSTOLIC BLOOD PRESSURE: 177 MMHG | HEART RATE: 54 BPM | RESPIRATION RATE: 28 BRPM | SYSTOLIC BLOOD PRESSURE: 122 MMHG | DIASTOLIC BLOOD PRESSURE: 80 MMHG | DIASTOLIC BLOOD PRESSURE: 82 MMHG | SYSTOLIC BLOOD PRESSURE: 172 MMHG | SYSTOLIC BLOOD PRESSURE: 130 MMHG | SYSTOLIC BLOOD PRESSURE: 122 MMHG | DIASTOLIC BLOOD PRESSURE: 79 MMHG | RESPIRATION RATE: 20 BRPM | SYSTOLIC BLOOD PRESSURE: 145 MMHG | SYSTOLIC BLOOD PRESSURE: 146 MMHG | DIASTOLIC BLOOD PRESSURE: 87 MMHG | DIASTOLIC BLOOD PRESSURE: 91 MMHG | DIASTOLIC BLOOD PRESSURE: 66 MMHG | RESPIRATION RATE: 14 BRPM | DIASTOLIC BLOOD PRESSURE: 80 MMHG | DIASTOLIC BLOOD PRESSURE: 66 MMHG | OXYGEN SATURATION: 96 % | DIASTOLIC BLOOD PRESSURE: 81 MMHG | RESPIRATION RATE: 12 BRPM | SYSTOLIC BLOOD PRESSURE: 146 MMHG | DIASTOLIC BLOOD PRESSURE: 73 MMHG | HEIGHT: 71 IN | HEART RATE: 66 BPM | SYSTOLIC BLOOD PRESSURE: 147 MMHG | RESPIRATION RATE: 34 BRPM | DIASTOLIC BLOOD PRESSURE: 80 MMHG | DIASTOLIC BLOOD PRESSURE: 73 MMHG | DIASTOLIC BLOOD PRESSURE: 68 MMHG | RESPIRATION RATE: 28 BRPM | HEART RATE: 70 BPM | HEART RATE: 75 BPM | DIASTOLIC BLOOD PRESSURE: 73 MMHG | SYSTOLIC BLOOD PRESSURE: 147 MMHG | OXYGEN SATURATION: 99 % | OXYGEN SATURATION: 100 % | DIASTOLIC BLOOD PRESSURE: 65 MMHG | DIASTOLIC BLOOD PRESSURE: 68 MMHG | DIASTOLIC BLOOD PRESSURE: 69 MMHG | HEART RATE: 57 BPM | HEART RATE: 76 BPM | DIASTOLIC BLOOD PRESSURE: 87 MMHG | SYSTOLIC BLOOD PRESSURE: 136 MMHG | OXYGEN SATURATION: 99 % | SYSTOLIC BLOOD PRESSURE: 136 MMHG | OXYGEN SATURATION: 98 % | SYSTOLIC BLOOD PRESSURE: 154 MMHG | HEART RATE: 59 BPM | SYSTOLIC BLOOD PRESSURE: 145 MMHG | DIASTOLIC BLOOD PRESSURE: 76 MMHG | DIASTOLIC BLOOD PRESSURE: 88 MMHG | DIASTOLIC BLOOD PRESSURE: 75 MMHG | DIASTOLIC BLOOD PRESSURE: 82 MMHG | HEART RATE: 82 BPM | HEART RATE: 82 BPM | RESPIRATION RATE: 13 BRPM | RESPIRATION RATE: 24 BRPM | RESPIRATION RATE: 13 BRPM | TEMPERATURE: 98 F | SYSTOLIC BLOOD PRESSURE: 128 MMHG | SYSTOLIC BLOOD PRESSURE: 154 MMHG | HEART RATE: 62 BPM | SYSTOLIC BLOOD PRESSURE: 177 MMHG | SYSTOLIC BLOOD PRESSURE: 136 MMHG | RESPIRATION RATE: 23 BRPM | SYSTOLIC BLOOD PRESSURE: 139 MMHG | RESPIRATION RATE: 17 BRPM | RESPIRATION RATE: 12 BRPM | RESPIRATION RATE: 12 BRPM | HEART RATE: 78 BPM | DIASTOLIC BLOOD PRESSURE: 70 MMHG | RESPIRATION RATE: 23 BRPM | RESPIRATION RATE: 10 BRPM | SYSTOLIC BLOOD PRESSURE: 158 MMHG | DIASTOLIC BLOOD PRESSURE: 91 MMHG | OXYGEN SATURATION: 96 % | HEART RATE: 74 BPM | SYSTOLIC BLOOD PRESSURE: 128 MMHG | SYSTOLIC BLOOD PRESSURE: 141 MMHG | DIASTOLIC BLOOD PRESSURE: 69 MMHG | RESPIRATION RATE: 24 BRPM | TEMPERATURE: 98 F | HEART RATE: 78 BPM | DIASTOLIC BLOOD PRESSURE: 88 MMHG | HEART RATE: 82 BPM | RESPIRATION RATE: 19 BRPM

## 2025-08-26 DIAGNOSIS — K63.5 POLYP OF COLON: ICD-10-CM

## 2025-08-26 DIAGNOSIS — K31.89 OTHER DISEASES OF STOMACH AND DUODENUM: ICD-10-CM

## 2025-08-26 DIAGNOSIS — K57.30 DIVERTICULOSIS OF LARGE INTESTINE WITHOUT PERFORATION OR ABS: ICD-10-CM

## 2025-08-26 DIAGNOSIS — K21.9 GASTRO-ESOPHAGEAL REFLUX DISEASE WITHOUT ESOPHAGITIS: ICD-10-CM

## 2025-08-26 DIAGNOSIS — K92.1 MELENA: ICD-10-CM

## 2025-08-26 DIAGNOSIS — K22.70 BARRETT'S ESOPHAGUS WITHOUT DYSPLASIA: ICD-10-CM

## 2025-08-26 DIAGNOSIS — K21.00 GASTRO-ESOPHAGEAL REFLUX DISEASE WITH ESOPHAGITIS, WITHOUT B: ICD-10-CM

## 2025-08-26 DIAGNOSIS — K44.9 DIAPHRAGMATIC HERNIA WITHOUT OBSTRUCTION OR GANGRENE: ICD-10-CM

## 2025-08-26 DIAGNOSIS — K64.8 OTHER HEMORRHOIDS: ICD-10-CM

## 2025-08-26 DIAGNOSIS — K22.89 OTHER SPECIFIED DISEASE OF ESOPHAGUS: ICD-10-CM

## 2025-08-26 PROCEDURE — 88313 SPECIAL STAINS GROUP 2: CPT | Performed by: PATHOLOGY

## 2025-08-26 PROCEDURE — 43239 EGD BIOPSY SINGLE/MULTIPLE: CPT | Performed by: INTERNAL MEDICINE

## 2025-08-26 PROCEDURE — 45380 COLONOSCOPY AND BIOPSY: CPT | Performed by: INTERNAL MEDICINE

## 2025-08-26 PROCEDURE — 43239 EGD BIOPSY SINGLE/MULTIPLE: CPT | Mod: 51 | Performed by: INTERNAL MEDICINE

## 2025-08-26 PROCEDURE — 88342 IMHCHEM/IMCYTCHM 1ST ANTB: CPT | Performed by: PATHOLOGY

## 2025-08-26 PROCEDURE — 88305 TISSUE EXAM BY PATHOLOGIST: CPT | Performed by: PATHOLOGY

## 2026-02-09 ENCOUNTER — APPOINTMENT (OUTPATIENT)
Age: 61
End: 2026-02-09
Payer: COMMERCIAL

## (undated) DEVICE — SOLUTION, IRRIGATION, SODIUM CHLORIDE 0.9%, 1000 ML, POUR BOTTLE

## (undated) DEVICE — TOWEL PACK, STERILE, 4/PACK, BLUE

## (undated) DEVICE — MARKER, SKIN, DUAL TIP, W/RULER AND LABEL

## (undated) DEVICE — SUTURE, ETHILON, 4-0, 18 IN, P-3, BLACK

## (undated) DEVICE — NEEDLE, HYPODERMIC, MONOJECT, 27 G X 1.5 IN

## (undated) DEVICE — BANDAGE, ELASTIC, SELF-CLOSE, 3 IN, HONEYCOMB, STERILE

## (undated) DEVICE — IMMOBILIZER, HAND, XLARGE, ALUMINUM

## (undated) DEVICE — DRAPE MINI, C-ARM, 54 X 64 IN

## (undated) DEVICE — STRIP, SKIN CLOSURE, STERI STRIP, REINFORCED, 0.5 X 4 IN

## (undated) DEVICE — DRILL BIT, 2.0MM, QC, 140MM, W/DEPTH MARK

## (undated) DEVICE — CAUTERY, PENCIL, PUSH BUTTON, SMOKE EVAC, 70MM

## (undated) DEVICE — DRESSING, GAUZE, PETROLATUM, PATCH, XEROFORM, 1 X 8 IN, STERILE

## (undated) DEVICE — GOWN, SURGICAL, SMARTGOWN, XX-LARGE, STERILE

## (undated) DEVICE — SUTURE, ETHIBOND, EXCEL, 4-0, TAPER POINT, RB-1 30IN

## (undated) DEVICE — SUTURE, ETHIBOND XTRA 3-0, BB CV-15 GREEN 30 INCH

## (undated) DEVICE — SUTURE, PROLENE, 3-0, 30 IN, RB-1, BLUE

## (undated) DEVICE — SUTURE, ETHIBOND XTRA, 2-0, 30 IN, RB-1, LF

## (undated) DEVICE — Device

## (undated) DEVICE — BLADE, OSCILLATING/SAGITTAL, 25MM X 9MM

## (undated) DEVICE — PREP, SKIN, BACTOSHEILD, 4%, 4OZ

## (undated) DEVICE — SPLINT, FAST SETTING, 3 X 15 IN, PLASTER, BLUE

## (undated) DEVICE — NEEDLE, ELECTRODE, ELECTROSURGICAL, INSULATED

## (undated) DEVICE — BANDAGE, GAUZE, 6 PLY, KERLIX, 2.25 IN X 3 YD, STERILE

## (undated) DEVICE — DRAPE, TOWEL, STERI DRAPE, 17 X 11 IN, PLASTIC, STERILE

## (undated) DEVICE — DRESSING, GAUZE, SUPER KERLIX, 6X6

## (undated) DEVICE — CUFF, TOURNIQUET, 18 X 4, DUAL PORT/SNGL BLADDER, DISP, LF

## (undated) DEVICE — SYRINGE, 60 CC, IRRIGATION, BULB, CONTRO-BULB, PAPER POUCH

## (undated) DEVICE — SOLUTION, IRRIGATION, STERILE WATER, 1000 ML, POUR BOTTLE

## (undated) DEVICE — GLOVES, SURG BIOGEL, SZ-7.5, PF, LF

## (undated) DEVICE — NEEDLE, TAPER, MAYO, 1/2 CIRCLE, DISPOSABLE, 5

## (undated) DEVICE — DRILL, 2.8 X 165

## (undated) DEVICE — DRAPE, FLUID WARMER

## (undated) DEVICE — PADDING, WEBRIL, UNDERCAST, STERILE, 3 IN

## (undated) DEVICE — SCISSORS, WIRE CUTTER, 4 IN, STERILE, DISP

## (undated) DEVICE — SUTURE, ETHILON 4-0, P3, BLK MONO, 18

## (undated) DEVICE — BIT, DRILL, QUICK-COUPLING, 2.5 X 110 MM, STAINLESS STEEL, GOLD

## (undated) DEVICE — ADHESIVE, SKIN, MASTISOL, 2/3 CC VIAL